# Patient Record
Sex: FEMALE | Race: WHITE | NOT HISPANIC OR LATINO | Employment: OTHER | URBAN - METROPOLITAN AREA
[De-identification: names, ages, dates, MRNs, and addresses within clinical notes are randomized per-mention and may not be internally consistent; named-entity substitution may affect disease eponyms.]

---

## 2017-01-05 ENCOUNTER — APPOINTMENT (OUTPATIENT)
Dept: LAB | Facility: CLINIC | Age: 82
End: 2017-01-05
Payer: MEDICARE

## 2017-01-05 ENCOUNTER — TRANSCRIBE ORDERS (OUTPATIENT)
Dept: LAB | Facility: CLINIC | Age: 82
End: 2017-01-05

## 2017-01-05 DIAGNOSIS — D64.9 ANEMIA, UNSPECIFIED: Primary | ICD-10-CM

## 2017-01-05 DIAGNOSIS — I10 ESSENTIAL HYPERTENSION, MALIGNANT: ICD-10-CM

## 2017-01-05 LAB
ANION GAP SERPL CALCULATED.3IONS-SCNC: 6 MMOL/L (ref 4–13)
BASOPHILS # BLD AUTO: 0.02 THOUSANDS/ΜL (ref 0–0.1)
BASOPHILS NFR BLD AUTO: 0 % (ref 0–1)
BUN SERPL-MCNC: 27 MG/DL (ref 5–25)
CALCIUM SERPL-MCNC: 9.2 MG/DL (ref 8.3–10.1)
CHLORIDE SERPL-SCNC: 103 MMOL/L (ref 100–108)
CO2 SERPL-SCNC: 30 MMOL/L (ref 21–32)
CREAT SERPL-MCNC: 0.94 MG/DL (ref 0.6–1.3)
EOSINOPHIL # BLD AUTO: 0.23 THOUSAND/ΜL (ref 0–0.61)
EOSINOPHIL NFR BLD AUTO: 4 % (ref 0–6)
ERYTHROCYTE [DISTWIDTH] IN BLOOD BY AUTOMATED COUNT: 14.5 % (ref 11.6–15.1)
GFR SERPL CREATININE-BSD FRML MDRD: 56.1 ML/MIN/1.73SQ M
GLUCOSE SERPL-MCNC: 102 MG/DL (ref 65–140)
HCT VFR BLD AUTO: 35.8 % (ref 34.8–46.1)
HGB BLD-MCNC: 11.4 G/DL (ref 11.5–15.4)
LYMPHOCYTES # BLD AUTO: 2.44 THOUSANDS/ΜL (ref 0.6–4.47)
LYMPHOCYTES NFR BLD AUTO: 38 % (ref 14–44)
MCH RBC QN AUTO: 29.8 PG (ref 26.8–34.3)
MCHC RBC AUTO-ENTMCNC: 31.8 G/DL (ref 31.4–37.4)
MCV RBC AUTO: 94 FL (ref 82–98)
MONOCYTES # BLD AUTO: 0.45 THOUSAND/ΜL (ref 0.17–1.22)
MONOCYTES NFR BLD AUTO: 7 % (ref 4–12)
NEUTROPHILS # BLD AUTO: 3.36 THOUSANDS/ΜL (ref 1.85–7.62)
NEUTS SEG NFR BLD AUTO: 51 % (ref 43–75)
NRBC BLD AUTO-RTO: 0 /100 WBCS
PLATELET # BLD AUTO: 234 THOUSANDS/UL (ref 149–390)
PMV BLD AUTO: 12.1 FL (ref 8.9–12.7)
POTASSIUM SERPL-SCNC: 3.7 MMOL/L (ref 3.5–5.3)
RBC # BLD AUTO: 3.82 MILLION/UL (ref 3.81–5.12)
SODIUM SERPL-SCNC: 139 MMOL/L (ref 136–145)
WBC # BLD AUTO: 6.51 THOUSAND/UL (ref 4.31–10.16)

## 2017-01-05 PROCEDURE — 36415 COLL VENOUS BLD VENIPUNCTURE: CPT

## 2017-01-05 PROCEDURE — 80048 BASIC METABOLIC PNL TOTAL CA: CPT

## 2017-01-05 PROCEDURE — 85025 COMPLETE CBC W/AUTO DIFF WBC: CPT

## 2017-03-03 ENCOUNTER — TRANSCRIBE ORDERS (OUTPATIENT)
Dept: ADMINISTRATIVE | Facility: HOSPITAL | Age: 82
End: 2017-03-03

## 2017-03-03 ENCOUNTER — HOSPITAL ENCOUNTER (OUTPATIENT)
Dept: RADIOLOGY | Facility: HOSPITAL | Age: 82
Discharge: HOME | End: 2017-03-03
Payer: MEDICARE

## 2017-03-03 DIAGNOSIS — M46.1 SACROILIITIS, NOT ELSEWHERE CLASSIFIED (HCC): ICD-10-CM

## 2017-03-03 DIAGNOSIS — M46.1 SACROILIITIS, NOT ELSEWHERE CLASSIFIED (HCC): Primary | ICD-10-CM

## 2017-03-03 PROCEDURE — 73522 X-RAY EXAM HIPS BI 3-4 VIEWS: CPT

## 2017-07-03 ENCOUNTER — TRANSCRIBE ORDERS (OUTPATIENT)
Dept: ADMINISTRATIVE | Facility: HOSPITAL | Age: 82
End: 2017-07-03

## 2017-07-03 DIAGNOSIS — M81.0 OSTEOPOROSIS, UNSPECIFIED OSTEOPOROSIS TYPE, UNSPECIFIED PATHOLOGICAL FRACTURE PRESENCE: Primary | ICD-10-CM

## 2017-07-13 ENCOUNTER — HOSPITAL ENCOUNTER (OUTPATIENT)
Dept: RADIOLOGY | Facility: HOSPITAL | Age: 82
Discharge: HOME | End: 2017-07-13
Attending: INTERNAL MEDICINE
Payer: MEDICARE

## 2017-07-13 DIAGNOSIS — M81.0 OSTEOPOROSIS, UNSPECIFIED OSTEOPOROSIS TYPE, UNSPECIFIED PATHOLOGICAL FRACTURE PRESENCE: ICD-10-CM

## 2017-07-13 PROCEDURE — 77080 DXA BONE DENSITY AXIAL: CPT

## 2017-08-23 ENCOUNTER — APPOINTMENT (OUTPATIENT)
Dept: LAB | Facility: CLINIC | Age: 82
End: 2017-08-23
Payer: MEDICARE

## 2017-08-23 ENCOUNTER — TRANSCRIBE ORDERS (OUTPATIENT)
Dept: LAB | Facility: CLINIC | Age: 82
End: 2017-08-23

## 2017-08-23 DIAGNOSIS — N39.0 URINARY TRACT INFECTION, SITE NOT SPECIFIED: ICD-10-CM

## 2017-08-23 DIAGNOSIS — E03.9 UNSPECIFIED HYPOTHYROIDISM: ICD-10-CM

## 2017-08-23 DIAGNOSIS — D64.9 ANEMIA, UNSPECIFIED: ICD-10-CM

## 2017-08-23 DIAGNOSIS — E13.9 OTHER SPECIFIED DIABETES MELLITUS: ICD-10-CM

## 2017-08-23 DIAGNOSIS — E55.9 UNSPECIFIED VITAMIN D DEFICIENCY: ICD-10-CM

## 2017-08-23 DIAGNOSIS — I10 ESSENTIAL HYPERTENSION, MALIGNANT: Primary | ICD-10-CM

## 2017-08-23 DIAGNOSIS — E78.00 PURE HYPERCHOLESTEROLEMIA: ICD-10-CM

## 2017-08-23 LAB
ALBUMIN SERPL BCP-MCNC: 3.4 G/DL (ref 3.5–5)
ALP SERPL-CCNC: 51 U/L (ref 46–116)
ALT SERPL W P-5'-P-CCNC: 21 U/L (ref 12–78)
ANION GAP SERPL CALCULATED.3IONS-SCNC: 8 MMOL/L (ref 4–13)
AST SERPL W P-5'-P-CCNC: 12 U/L (ref 5–45)
BACTERIA UR QL AUTO: ABNORMAL /HPF
BASOPHILS # BLD AUTO: 0.01 THOUSANDS/ΜL (ref 0–0.1)
BASOPHILS NFR BLD AUTO: 0 % (ref 0–1)
BILIRUB SERPL-MCNC: 1.38 MG/DL (ref 0.2–1)
BILIRUB UR QL STRIP: NEGATIVE
BUN SERPL-MCNC: 13 MG/DL (ref 5–25)
CALCIUM SERPL-MCNC: 9 MG/DL (ref 8.3–10.1)
CHLORIDE SERPL-SCNC: 93 MMOL/L (ref 100–108)
CHOLEST SERPL-MCNC: 146 MG/DL (ref 50–200)
CLARITY UR: CLEAR
CO2 SERPL-SCNC: 29 MMOL/L (ref 21–32)
COLOR UR: YELLOW
CREAT SERPL-MCNC: 0.94 MG/DL (ref 0.6–1.3)
EOSINOPHIL # BLD AUTO: 0.15 THOUSAND/ΜL (ref 0–0.61)
EOSINOPHIL NFR BLD AUTO: 2 % (ref 0–6)
ERYTHROCYTE [DISTWIDTH] IN BLOOD BY AUTOMATED COUNT: 14.2 % (ref 11.6–15.1)
EST. AVERAGE GLUCOSE BLD GHB EST-MCNC: 117 MG/DL
GFR SERPL CREATININE-BSD FRML MDRD: 54 ML/MIN/1.73SQ M
GLUCOSE P FAST SERPL-MCNC: 93 MG/DL (ref 65–99)
GLUCOSE UR STRIP-MCNC: NEGATIVE MG/DL
HBA1C MFR BLD: 5.7 % (ref 4.2–6.3)
HCT VFR BLD AUTO: 38.4 % (ref 34.8–46.1)
HDLC SERPL-MCNC: 53 MG/DL (ref 40–60)
HGB BLD-MCNC: 13.1 G/DL (ref 11.5–15.4)
HGB UR QL STRIP.AUTO: NEGATIVE
HYALINE CASTS #/AREA URNS LPF: ABNORMAL /LPF
KETONES UR STRIP-MCNC: NEGATIVE MG/DL
LDLC SERPL CALC-MCNC: 49 MG/DL (ref 0–100)
LEUKOCYTE ESTERASE UR QL STRIP: ABNORMAL
LYMPHOCYTES # BLD AUTO: 3.04 THOUSANDS/ΜL (ref 0.6–4.47)
LYMPHOCYTES NFR BLD AUTO: 36 % (ref 14–44)
MCH RBC QN AUTO: 30.8 PG (ref 26.8–34.3)
MCHC RBC AUTO-ENTMCNC: 34.1 G/DL (ref 31.4–37.4)
MCV RBC AUTO: 90 FL (ref 82–98)
MONOCYTES # BLD AUTO: 0.76 THOUSAND/ΜL (ref 0.17–1.22)
MONOCYTES NFR BLD AUTO: 9 % (ref 4–12)
NEUTROPHILS # BLD AUTO: 4.38 THOUSANDS/ΜL (ref 1.85–7.62)
NEUTS SEG NFR BLD AUTO: 53 % (ref 43–75)
NITRITE UR QL STRIP: NEGATIVE
NON-SQ EPI CELLS URNS QL MICRO: ABNORMAL /HPF
NRBC BLD AUTO-RTO: 0 /100 WBCS
PH UR STRIP.AUTO: 5.5 [PH] (ref 4.5–8)
PLATELET # BLD AUTO: 244 THOUSANDS/UL (ref 149–390)
PMV BLD AUTO: 12 FL (ref 8.9–12.7)
POTASSIUM SERPL-SCNC: 4.8 MMOL/L (ref 3.5–5.3)
PROT SERPL-MCNC: 7.4 G/DL (ref 6.4–8.2)
PROT UR STRIP-MCNC: NEGATIVE MG/DL
RBC # BLD AUTO: 4.26 MILLION/UL (ref 3.81–5.12)
RBC #/AREA URNS AUTO: ABNORMAL /HPF
SODIUM SERPL-SCNC: 130 MMOL/L (ref 136–145)
SP GR UR STRIP.AUTO: 1.01 (ref 1–1.03)
TRIGL SERPL-MCNC: 222 MG/DL
TSH SERPL DL<=0.05 MIU/L-ACNC: 5.62 UIU/ML (ref 0.36–3.74)
UROBILINOGEN UR QL STRIP.AUTO: 0.2 E.U./DL
WBC # BLD AUTO: 8.36 THOUSAND/UL (ref 4.31–10.16)
WBC #/AREA URNS AUTO: ABNORMAL /HPF

## 2017-08-23 PROCEDURE — 80061 LIPID PANEL: CPT

## 2017-08-23 PROCEDURE — 81001 URINALYSIS AUTO W/SCOPE: CPT

## 2017-08-23 PROCEDURE — 84443 ASSAY THYROID STIM HORMONE: CPT

## 2017-08-23 PROCEDURE — 83036 HEMOGLOBIN GLYCOSYLATED A1C: CPT

## 2017-08-23 PROCEDURE — 85025 COMPLETE CBC W/AUTO DIFF WBC: CPT

## 2017-08-23 PROCEDURE — 36415 COLL VENOUS BLD VENIPUNCTURE: CPT

## 2017-08-23 PROCEDURE — 80053 COMPREHEN METABOLIC PANEL: CPT

## 2017-09-11 ENCOUNTER — TRANSCRIBE ORDERS (OUTPATIENT)
Dept: LAB | Facility: CLINIC | Age: 82
End: 2017-09-11

## 2017-09-11 ENCOUNTER — APPOINTMENT (OUTPATIENT)
Dept: LAB | Facility: CLINIC | Age: 82
End: 2017-09-11
Payer: MEDICARE

## 2017-09-11 DIAGNOSIS — I10 ESSENTIAL HYPERTENSION, MALIGNANT: Primary | ICD-10-CM

## 2017-09-11 DIAGNOSIS — E87.8 ELECTROLYTE AND FLUID DISORDERS NOT ELSEWHERE CLASSIFIED: ICD-10-CM

## 2017-09-11 LAB
ANION GAP SERPL CALCULATED.3IONS-SCNC: 5 MMOL/L (ref 4–13)
BUN SERPL-MCNC: 18 MG/DL (ref 5–25)
CALCIUM SERPL-MCNC: 9.6 MG/DL (ref 8.3–10.1)
CHLORIDE SERPL-SCNC: 100 MMOL/L (ref 100–108)
CO2 SERPL-SCNC: 31 MMOL/L (ref 21–32)
CREAT SERPL-MCNC: 0.94 MG/DL (ref 0.6–1.3)
GFR SERPL CREATININE-BSD FRML MDRD: 54 ML/MIN/1.73SQ M
GLUCOSE P FAST SERPL-MCNC: 105 MG/DL (ref 65–99)
OSMOLALITY UR/SERPL-RTO: 293 MMOL/KG (ref 282–298)
POTASSIUM SERPL-SCNC: 4.2 MMOL/L (ref 3.5–5.3)
SODIUM SERPL-SCNC: 136 MMOL/L (ref 136–145)

## 2017-09-11 PROCEDURE — 80048 BASIC METABOLIC PNL TOTAL CA: CPT

## 2017-09-11 PROCEDURE — 83930 ASSAY OF BLOOD OSMOLALITY: CPT

## 2017-09-11 PROCEDURE — 36415 COLL VENOUS BLD VENIPUNCTURE: CPT

## 2018-01-29 ENCOUNTER — HOSPITAL ENCOUNTER (OUTPATIENT)
Dept: RADIOLOGY | Facility: HOSPITAL | Age: 83
Discharge: HOME | End: 2018-01-29
Attending: INTERNAL MEDICINE
Payer: MEDICARE

## 2018-01-29 ENCOUNTER — TRANSCRIBE ORDERS (OUTPATIENT)
Dept: ADMINISTRATIVE | Facility: HOSPITAL | Age: 83
End: 2018-01-29

## 2018-01-29 DIAGNOSIS — M54.2 NECK PAIN: Primary | ICD-10-CM

## 2018-01-29 PROCEDURE — 72050 X-RAY EXAM NECK SPINE 4/5VWS: CPT

## 2018-02-22 ENCOUNTER — TRANSCRIBE ORDERS (OUTPATIENT)
Dept: LAB | Facility: CLINIC | Age: 83
End: 2018-02-22

## 2018-02-22 ENCOUNTER — APPOINTMENT (OUTPATIENT)
Dept: LAB | Facility: CLINIC | Age: 83
End: 2018-02-22
Payer: MEDICARE

## 2018-02-22 DIAGNOSIS — I51.9 MYXEDEMA HEART DISEASE: ICD-10-CM

## 2018-02-22 DIAGNOSIS — E55.9 VITAMIN D DEFICIENCY: ICD-10-CM

## 2018-02-22 DIAGNOSIS — E13.8 DIABETES MELLITUS OF OTHER TYPE WITH COMPLICATION, UNSPECIFIED LONG TERM INSULIN USE STATUS: ICD-10-CM

## 2018-02-22 DIAGNOSIS — E03.9 MYXEDEMA HEART DISEASE: ICD-10-CM

## 2018-02-22 DIAGNOSIS — E78.00 PURE HYPERCHOLESTEROLEMIA: ICD-10-CM

## 2018-02-22 DIAGNOSIS — N39.0 URINARY TRACT INFECTION WITHOUT HEMATURIA, SITE UNSPECIFIED: Primary | ICD-10-CM

## 2018-02-22 DIAGNOSIS — I10 ESSENTIAL HYPERTENSION, MALIGNANT: ICD-10-CM

## 2018-02-22 DIAGNOSIS — D64.9 ANEMIA, UNSPECIFIED TYPE: ICD-10-CM

## 2018-02-22 LAB
25(OH)D3 SERPL-MCNC: 20.6 NG/ML (ref 30–100)
ALBUMIN SERPL BCP-MCNC: 3.5 G/DL (ref 3.5–5)
ALP SERPL-CCNC: 54 U/L (ref 46–116)
ALT SERPL W P-5'-P-CCNC: 20 U/L (ref 12–78)
ANION GAP SERPL CALCULATED.3IONS-SCNC: 6 MMOL/L (ref 4–13)
AST SERPL W P-5'-P-CCNC: 18 U/L (ref 5–45)
BACTERIA UR QL AUTO: ABNORMAL /HPF
BASOPHILS # BLD AUTO: 0.01 THOUSANDS/ΜL (ref 0–0.1)
BASOPHILS NFR BLD AUTO: 0 % (ref 0–1)
BILIRUB SERPL-MCNC: 0.6 MG/DL (ref 0.2–1)
BILIRUB UR QL STRIP: NEGATIVE
BUN SERPL-MCNC: 25 MG/DL (ref 5–25)
CALCIUM SERPL-MCNC: 9 MG/DL (ref 8.3–10.1)
CHLORIDE SERPL-SCNC: 101 MMOL/L (ref 100–108)
CHOLEST SERPL-MCNC: 136 MG/DL (ref 50–200)
CLARITY UR: CLEAR
CO2 SERPL-SCNC: 31 MMOL/L (ref 21–32)
COLOR UR: YELLOW
CREAT SERPL-MCNC: 0.94 MG/DL (ref 0.6–1.3)
EOSINOPHIL # BLD AUTO: 0.16 THOUSAND/ΜL (ref 0–0.61)
EOSINOPHIL NFR BLD AUTO: 3 % (ref 0–6)
ERYTHROCYTE [DISTWIDTH] IN BLOOD BY AUTOMATED COUNT: 14.2 % (ref 11.6–15.1)
EST. AVERAGE GLUCOSE BLD GHB EST-MCNC: 114 MG/DL
GFR SERPL CREATININE-BSD FRML MDRD: 53 ML/MIN/1.73SQ M
GLUCOSE P FAST SERPL-MCNC: 99 MG/DL (ref 65–99)
GLUCOSE UR STRIP-MCNC: NEGATIVE MG/DL
HBA1C MFR BLD: 5.6 % (ref 4.2–6.3)
HCT VFR BLD AUTO: 37.3 % (ref 34.8–46.1)
HDLC SERPL-MCNC: 56 MG/DL (ref 40–60)
HGB BLD-MCNC: 12.3 G/DL (ref 11.5–15.4)
HGB UR QL STRIP.AUTO: NEGATIVE
HYALINE CASTS #/AREA URNS LPF: ABNORMAL /LPF
KETONES UR STRIP-MCNC: NEGATIVE MG/DL
LDLC SERPL CALC-MCNC: 56 MG/DL (ref 0–100)
LEUKOCYTE ESTERASE UR QL STRIP: ABNORMAL
LYMPHOCYTES # BLD AUTO: 2.2 THOUSANDS/ΜL (ref 0.6–4.47)
LYMPHOCYTES NFR BLD AUTO: 42 % (ref 14–44)
MCH RBC QN AUTO: 30.6 PG (ref 26.8–34.3)
MCHC RBC AUTO-ENTMCNC: 33 G/DL (ref 31.4–37.4)
MCV RBC AUTO: 93 FL (ref 82–98)
MONOCYTES # BLD AUTO: 0.38 THOUSAND/ΜL (ref 0.17–1.22)
MONOCYTES NFR BLD AUTO: 7 % (ref 4–12)
NEUTROPHILS # BLD AUTO: 2.51 THOUSANDS/ΜL (ref 1.85–7.62)
NEUTS SEG NFR BLD AUTO: 48 % (ref 43–75)
NITRITE UR QL STRIP: NEGATIVE
NON-SQ EPI CELLS URNS QL MICRO: ABNORMAL /HPF
NRBC BLD AUTO-RTO: 0 /100 WBCS
PH UR STRIP.AUTO: 5.5 [PH] (ref 4.5–8)
PLATELET # BLD AUTO: 175 THOUSANDS/UL (ref 149–390)
PMV BLD AUTO: 12.5 FL (ref 8.9–12.7)
POTASSIUM SERPL-SCNC: 3.8 MMOL/L (ref 3.5–5.3)
PROT SERPL-MCNC: 7.1 G/DL (ref 6.4–8.2)
PROT UR STRIP-MCNC: NEGATIVE MG/DL
RBC # BLD AUTO: 4.02 MILLION/UL (ref 3.81–5.12)
RBC #/AREA URNS AUTO: ABNORMAL /HPF
SODIUM SERPL-SCNC: 138 MMOL/L (ref 136–145)
SP GR UR STRIP.AUTO: 1.01 (ref 1–1.03)
TRIGL SERPL-MCNC: 119 MG/DL
TSH SERPL DL<=0.05 MIU/L-ACNC: 0.68 UIU/ML (ref 0.36–3.74)
UROBILINOGEN UR QL STRIP.AUTO: 0.2 E.U./DL
WBC # BLD AUTO: 5.27 THOUSAND/UL (ref 4.31–10.16)
WBC #/AREA URNS AUTO: ABNORMAL /HPF

## 2018-02-22 PROCEDURE — 80053 COMPREHEN METABOLIC PANEL: CPT

## 2018-02-22 PROCEDURE — 83036 HEMOGLOBIN GLYCOSYLATED A1C: CPT

## 2018-02-22 PROCEDURE — 85025 COMPLETE CBC W/AUTO DIFF WBC: CPT

## 2018-02-22 PROCEDURE — 81001 URINALYSIS AUTO W/SCOPE: CPT

## 2018-02-22 PROCEDURE — 36415 COLL VENOUS BLD VENIPUNCTURE: CPT

## 2018-02-22 PROCEDURE — 80061 LIPID PANEL: CPT

## 2018-02-22 PROCEDURE — 84443 ASSAY THYROID STIM HORMONE: CPT

## 2018-02-22 PROCEDURE — 82306 VITAMIN D 25 HYDROXY: CPT

## 2018-05-14 ENCOUNTER — HOSPITAL ENCOUNTER (OUTPATIENT)
Dept: RADIOLOGY | Facility: HOSPITAL | Age: 83
Discharge: HOME | End: 2018-05-14
Attending: INTERNAL MEDICINE
Payer: MEDICARE

## 2018-05-14 ENCOUNTER — TRANSCRIBE ORDERS (OUTPATIENT)
Dept: ADMINISTRATIVE | Facility: HOSPITAL | Age: 83
End: 2018-05-14

## 2018-05-14 DIAGNOSIS — M17.0 PRIMARY OSTEOARTHRITIS OF BOTH KNEES: ICD-10-CM

## 2018-05-14 DIAGNOSIS — M17.0 PRIMARY OSTEOARTHRITIS OF BOTH KNEES: Primary | ICD-10-CM

## 2018-05-14 PROCEDURE — 73562 X-RAY EXAM OF KNEE 3: CPT

## 2018-12-27 ENCOUNTER — TRANSCRIBE ORDERS (OUTPATIENT)
Dept: LAB | Facility: CLINIC | Age: 83
End: 2018-12-27

## 2019-01-09 ENCOUNTER — TRANSCRIBE ORDERS (OUTPATIENT)
Dept: LAB | Facility: CLINIC | Age: 84
End: 2019-01-09

## 2019-01-09 ENCOUNTER — APPOINTMENT (OUTPATIENT)
Dept: LAB | Facility: CLINIC | Age: 84
End: 2019-01-09
Payer: MEDICARE

## 2019-01-09 DIAGNOSIS — E13.8 DIABETES MELLITUS OF OTHER TYPE WITH COMPLICATION, UNSPECIFIED WHETHER LONG TERM INSULIN USE: ICD-10-CM

## 2019-01-09 DIAGNOSIS — E55.9 VITAMIN D DEFICIENCY, UNSPECIFIED: ICD-10-CM

## 2019-01-09 DIAGNOSIS — D64.9 ANEMIA, UNSPECIFIED TYPE: ICD-10-CM

## 2019-01-09 DIAGNOSIS — E78.00 PURE HYPERCHOLESTEROLEMIA: ICD-10-CM

## 2019-01-09 DIAGNOSIS — N39.0 URINARY TRACT INFECTION WITHOUT HEMATURIA, SITE UNSPECIFIED: ICD-10-CM

## 2019-01-09 DIAGNOSIS — I10 ESSENTIAL HYPERTENSION, MALIGNANT: Primary | ICD-10-CM

## 2019-01-09 LAB
25(OH)D3 SERPL-MCNC: 24.8 NG/ML (ref 30–100)
ALBUMIN SERPL BCP-MCNC: 4.1 G/DL (ref 3.5–5)
ALP SERPL-CCNC: 45 U/L (ref 46–116)
ALT SERPL W P-5'-P-CCNC: 21 U/L (ref 12–78)
ANION GAP SERPL CALCULATED.3IONS-SCNC: 4 MMOL/L (ref 4–13)
AST SERPL W P-5'-P-CCNC: 16 U/L (ref 5–45)
BACTERIA UR QL AUTO: ABNORMAL /HPF
BASOPHILS # BLD AUTO: 0.03 THOUSANDS/ΜL (ref 0–0.1)
BASOPHILS NFR BLD AUTO: 1 % (ref 0–1)
BILIRUB SERPL-MCNC: 0.62 MG/DL (ref 0.2–1)
BILIRUB UR QL STRIP: NEGATIVE
BUN SERPL-MCNC: 24 MG/DL (ref 5–25)
CALCIUM SERPL-MCNC: 8.8 MG/DL (ref 8.3–10.1)
CHLORIDE SERPL-SCNC: 103 MMOL/L (ref 100–108)
CHOLEST SERPL-MCNC: 146 MG/DL (ref 50–200)
CLARITY UR: CLEAR
CO2 SERPL-SCNC: 32 MMOL/L (ref 21–32)
COLOR UR: YELLOW
CREAT SERPL-MCNC: 1.08 MG/DL (ref 0.6–1.3)
EOSINOPHIL # BLD AUTO: 0.17 THOUSAND/ΜL (ref 0–0.61)
EOSINOPHIL NFR BLD AUTO: 3 % (ref 0–6)
ERYTHROCYTE [DISTWIDTH] IN BLOOD BY AUTOMATED COUNT: 14.1 % (ref 11.6–15.1)
EST. AVERAGE GLUCOSE BLD GHB EST-MCNC: 128 MG/DL
GFR SERPL CREATININE-BSD FRML MDRD: 45 ML/MIN/1.73SQ M
GLUCOSE P FAST SERPL-MCNC: 103 MG/DL (ref 65–99)
GLUCOSE UR STRIP-MCNC: NEGATIVE MG/DL
HBA1C MFR BLD: 6.1 % (ref 4.2–6.3)
HCT VFR BLD AUTO: 38.5 % (ref 34.8–46.1)
HDLC SERPL-MCNC: 56 MG/DL (ref 40–60)
HGB BLD-MCNC: 12.4 G/DL (ref 11.5–15.4)
HGB UR QL STRIP.AUTO: NEGATIVE
HYALINE CASTS #/AREA URNS LPF: ABNORMAL /LPF
IMM GRANULOCYTES # BLD AUTO: 0.01 THOUSAND/UL (ref 0–0.2)
IMM GRANULOCYTES NFR BLD AUTO: 0 % (ref 0–2)
KETONES UR STRIP-MCNC: NEGATIVE MG/DL
LDLC SERPL CALC-MCNC: 62 MG/DL (ref 0–100)
LEUKOCYTE ESTERASE UR QL STRIP: ABNORMAL
LYMPHOCYTES # BLD AUTO: 2.71 THOUSANDS/ΜL (ref 0.6–4.47)
LYMPHOCYTES NFR BLD AUTO: 46 % (ref 14–44)
MCH RBC QN AUTO: 31 PG (ref 26.8–34.3)
MCHC RBC AUTO-ENTMCNC: 32.2 G/DL (ref 31.4–37.4)
MCV RBC AUTO: 96 FL (ref 82–98)
MONOCYTES # BLD AUTO: 0.4 THOUSAND/ΜL (ref 0.17–1.22)
MONOCYTES NFR BLD AUTO: 7 % (ref 4–12)
NEUTROPHILS # BLD AUTO: 2.51 THOUSANDS/ΜL (ref 1.85–7.62)
NEUTS SEG NFR BLD AUTO: 43 % (ref 43–75)
NITRITE UR QL STRIP: NEGATIVE
NON-SQ EPI CELLS URNS QL MICRO: ABNORMAL /HPF
NONHDLC SERPL-MCNC: 90 MG/DL
NRBC BLD AUTO-RTO: 0 /100 WBCS
PH UR STRIP.AUTO: 6 [PH] (ref 4.5–8)
PLATELET # BLD AUTO: 180 THOUSANDS/UL (ref 149–390)
PMV BLD AUTO: 12.2 FL (ref 8.9–12.7)
POTASSIUM SERPL-SCNC: 3.7 MMOL/L (ref 3.5–5.3)
PROT SERPL-MCNC: 7.5 G/DL (ref 6.4–8.2)
PROT UR STRIP-MCNC: ABNORMAL MG/DL
RBC # BLD AUTO: 4 MILLION/UL (ref 3.81–5.12)
RBC #/AREA URNS AUTO: ABNORMAL /HPF
SODIUM SERPL-SCNC: 139 MMOL/L (ref 136–145)
SP GR UR STRIP.AUTO: 1.02 (ref 1–1.03)
TRIGL SERPL-MCNC: 138 MG/DL
TSH SERPL DL<=0.05 MIU/L-ACNC: 0.81 UIU/ML (ref 0.36–3.74)
UROBILINOGEN UR QL STRIP.AUTO: 0.2 E.U./DL
VIT B12 SERPL-MCNC: 751 PG/ML (ref 100–900)
WBC # BLD AUTO: 5.83 THOUSAND/UL (ref 4.31–10.16)
WBC #/AREA URNS AUTO: ABNORMAL /HPF

## 2019-01-09 PROCEDURE — 84443 ASSAY THYROID STIM HORMONE: CPT

## 2019-01-09 PROCEDURE — 85025 COMPLETE CBC W/AUTO DIFF WBC: CPT

## 2019-01-09 PROCEDURE — 36415 COLL VENOUS BLD VENIPUNCTURE: CPT

## 2019-01-09 PROCEDURE — 82607 VITAMIN B-12: CPT

## 2019-01-09 PROCEDURE — 80053 COMPREHEN METABOLIC PANEL: CPT

## 2019-01-09 PROCEDURE — 82306 VITAMIN D 25 HYDROXY: CPT

## 2019-01-09 PROCEDURE — 83036 HEMOGLOBIN GLYCOSYLATED A1C: CPT

## 2019-01-09 PROCEDURE — 81001 URINALYSIS AUTO W/SCOPE: CPT

## 2019-01-09 PROCEDURE — 80061 LIPID PANEL: CPT

## 2019-09-12 ENCOUNTER — APPOINTMENT (OUTPATIENT)
Dept: LAB | Facility: CLINIC | Age: 84
End: 2019-09-12
Payer: MEDICARE

## 2019-09-12 ENCOUNTER — TRANSCRIBE ORDERS (OUTPATIENT)
Dept: LAB | Facility: CLINIC | Age: 84
End: 2019-09-12

## 2019-09-12 DIAGNOSIS — D03.9: ICD-10-CM

## 2019-09-12 DIAGNOSIS — I10 ESSENTIAL HYPERTENSION, BENIGN: Primary | ICD-10-CM

## 2019-09-12 DIAGNOSIS — E55.9 AVITAMINOSIS D: ICD-10-CM

## 2019-09-12 DIAGNOSIS — E11.9 DIABETES MELLITUS WITHOUT COMPLICATION (HCC): ICD-10-CM

## 2019-09-12 DIAGNOSIS — E78.00 PURE HYPERCHOLESTEROLEMIA: ICD-10-CM

## 2019-09-12 LAB
ALBUMIN SERPL BCP-MCNC: 3.6 G/DL (ref 3.5–5)
ALP SERPL-CCNC: 43 U/L (ref 46–116)
ALT SERPL W P-5'-P-CCNC: 20 U/L (ref 12–78)
ANION GAP SERPL CALCULATED.3IONS-SCNC: 4 MMOL/L (ref 4–13)
AST SERPL W P-5'-P-CCNC: 15 U/L (ref 5–45)
BILIRUB SERPL-MCNC: 0.54 MG/DL (ref 0.2–1)
BUN SERPL-MCNC: 24 MG/DL (ref 5–25)
CALCIUM SERPL-MCNC: 9.3 MG/DL (ref 8.3–10.1)
CHLORIDE SERPL-SCNC: 103 MMOL/L (ref 100–108)
CHOLEST SERPL-MCNC: 139 MG/DL (ref 50–200)
CO2 SERPL-SCNC: 31 MMOL/L (ref 21–32)
CREAT SERPL-MCNC: 0.96 MG/DL (ref 0.6–1.3)
GFR SERPL CREATININE-BSD FRML MDRD: 52 ML/MIN/1.73SQ M
GLUCOSE P FAST SERPL-MCNC: 105 MG/DL (ref 65–99)
HDLC SERPL-MCNC: 55 MG/DL (ref 40–60)
LDLC SERPL CALC-MCNC: 64 MG/DL (ref 0–100)
NONHDLC SERPL-MCNC: 84 MG/DL
POTASSIUM SERPL-SCNC: 3.8 MMOL/L (ref 3.5–5.3)
PROT SERPL-MCNC: 7 G/DL (ref 6.4–8.2)
SODIUM SERPL-SCNC: 138 MMOL/L (ref 136–145)
TRIGL SERPL-MCNC: 99 MG/DL
TSH SERPL DL<=0.05 MIU/L-ACNC: 1.01 UIU/ML (ref 0.36–3.74)

## 2019-09-12 PROCEDURE — 80053 COMPREHEN METABOLIC PANEL: CPT

## 2019-09-12 PROCEDURE — 36415 COLL VENOUS BLD VENIPUNCTURE: CPT

## 2019-09-12 PROCEDURE — 84443 ASSAY THYROID STIM HORMONE: CPT

## 2019-09-12 PROCEDURE — 80061 LIPID PANEL: CPT

## 2020-07-09 ENCOUNTER — TRANSCRIBE ORDERS (OUTPATIENT)
Dept: ADMINISTRATIVE | Facility: HOSPITAL | Age: 85
End: 2020-07-09

## 2020-07-09 DIAGNOSIS — Z13.820 SPECIAL SCREENING FOR OSTEOPOROSIS: Primary | ICD-10-CM

## 2020-08-13 ENCOUNTER — APPOINTMENT (OUTPATIENT)
Dept: LAB | Facility: CLINIC | Age: 85
End: 2020-08-13
Payer: MEDICARE

## 2020-08-13 ENCOUNTER — TRANSCRIBE ORDERS (OUTPATIENT)
Dept: LAB | Facility: CLINIC | Age: 85
End: 2020-08-13

## 2020-08-13 DIAGNOSIS — E03.9 MYXEDEMA HEART DISEASE: ICD-10-CM

## 2020-08-13 DIAGNOSIS — N39.0 URINARY TRACT INFECTION WITHOUT HEMATURIA, SITE UNSPECIFIED: ICD-10-CM

## 2020-08-13 DIAGNOSIS — I10 ESSENTIAL HYPERTENSION, BENIGN: ICD-10-CM

## 2020-08-13 DIAGNOSIS — E78.00 PURE HYPERCHOLESTEROLEMIA: ICD-10-CM

## 2020-08-13 DIAGNOSIS — E11.9 DIABETES MELLITUS WITHOUT COMPLICATION (HCC): ICD-10-CM

## 2020-08-13 DIAGNOSIS — D51.9 ANEMIA DUE TO VITAMIN B12 DEFICIENCY, UNSPECIFIED B12 DEFICIENCY TYPE: Primary | ICD-10-CM

## 2020-08-13 DIAGNOSIS — I51.9 MYXEDEMA HEART DISEASE: ICD-10-CM

## 2020-08-13 LAB
25(OH)D3 SERPL-MCNC: 19.7 NG/ML (ref 30–100)
ALBUMIN SERPL BCP-MCNC: 3.5 G/DL (ref 3.5–5)
ALP SERPL-CCNC: 40 U/L (ref 46–116)
ALT SERPL W P-5'-P-CCNC: 20 U/L (ref 12–78)
ANION GAP SERPL CALCULATED.3IONS-SCNC: 3 MMOL/L (ref 4–13)
AST SERPL W P-5'-P-CCNC: 16 U/L (ref 5–45)
BACTERIA UR QL AUTO: ABNORMAL /HPF
BASOPHILS # BLD AUTO: 0.02 THOUSANDS/ΜL (ref 0–0.1)
BASOPHILS NFR BLD AUTO: 0 % (ref 0–1)
BILIRUB SERPL-MCNC: 0.53 MG/DL (ref 0.2–1)
BILIRUB UR QL STRIP: NEGATIVE
BUN SERPL-MCNC: 19 MG/DL (ref 5–25)
CALCIUM SERPL-MCNC: 9.3 MG/DL (ref 8.3–10.1)
CHLORIDE SERPL-SCNC: 106 MMOL/L (ref 100–108)
CHOLEST SERPL-MCNC: 136 MG/DL (ref 50–200)
CLARITY UR: CLEAR
CO2 SERPL-SCNC: 33 MMOL/L (ref 21–32)
COLOR UR: YELLOW
CREAT SERPL-MCNC: 0.92 MG/DL (ref 0.6–1.3)
EOSINOPHIL # BLD AUTO: 0.1 THOUSAND/ΜL (ref 0–0.61)
EOSINOPHIL NFR BLD AUTO: 2 % (ref 0–6)
ERYTHROCYTE [DISTWIDTH] IN BLOOD BY AUTOMATED COUNT: 14 % (ref 11.6–15.1)
EST. AVERAGE GLUCOSE BLD GHB EST-MCNC: 105 MG/DL
GFR SERPL CREATININE-BSD FRML MDRD: 54 ML/MIN/1.73SQ M
GLUCOSE P FAST SERPL-MCNC: 101 MG/DL (ref 65–99)
GLUCOSE UR STRIP-MCNC: NEGATIVE MG/DL
HBA1C MFR BLD: 5.3 %
HCT VFR BLD AUTO: 37.2 % (ref 34.8–46.1)
HDLC SERPL-MCNC: 49 MG/DL
HGB BLD-MCNC: 12.3 G/DL (ref 11.5–15.4)
HGB UR QL STRIP.AUTO: NEGATIVE
HYALINE CASTS #/AREA URNS LPF: ABNORMAL /LPF
IMM GRANULOCYTES # BLD AUTO: 0.01 THOUSAND/UL (ref 0–0.2)
IMM GRANULOCYTES NFR BLD AUTO: 0 % (ref 0–2)
KETONES UR STRIP-MCNC: NEGATIVE MG/DL
LDLC SERPL CALC-MCNC: 61 MG/DL (ref 0–100)
LEUKOCYTE ESTERASE UR QL STRIP: ABNORMAL
LYMPHOCYTES # BLD AUTO: 1.77 THOUSANDS/ΜL (ref 0.6–4.47)
LYMPHOCYTES NFR BLD AUTO: 38 % (ref 14–44)
MCH RBC QN AUTO: 32 PG (ref 26.8–34.3)
MCHC RBC AUTO-ENTMCNC: 33.1 G/DL (ref 31.4–37.4)
MCV RBC AUTO: 97 FL (ref 82–98)
MONOCYTES # BLD AUTO: 0.32 THOUSAND/ΜL (ref 0.17–1.22)
MONOCYTES NFR BLD AUTO: 7 % (ref 4–12)
NEUTROPHILS # BLD AUTO: 2.42 THOUSANDS/ΜL (ref 1.85–7.62)
NEUTS SEG NFR BLD AUTO: 53 % (ref 43–75)
NITRITE UR QL STRIP: NEGATIVE
NON-SQ EPI CELLS URNS QL MICRO: ABNORMAL /HPF
NONHDLC SERPL-MCNC: 87 MG/DL
NRBC BLD AUTO-RTO: 0 /100 WBCS
PH UR STRIP.AUTO: 5.5 [PH]
PLATELET # BLD AUTO: 159 THOUSANDS/UL (ref 149–390)
PMV BLD AUTO: 12.4 FL (ref 8.9–12.7)
POTASSIUM SERPL-SCNC: 4.1 MMOL/L (ref 3.5–5.3)
PROT SERPL-MCNC: 7.1 G/DL (ref 6.4–8.2)
PROT UR STRIP-MCNC: NEGATIVE MG/DL
RBC # BLD AUTO: 3.84 MILLION/UL (ref 3.81–5.12)
RBC #/AREA URNS AUTO: ABNORMAL /HPF
SODIUM SERPL-SCNC: 142 MMOL/L (ref 136–145)
SP GR UR STRIP.AUTO: 1.02 (ref 1–1.03)
T4 FREE SERPL-MCNC: 1.1 NG/DL (ref 0.76–1.46)
TRIGL SERPL-MCNC: 129 MG/DL
TSH SERPL DL<=0.05 MIU/L-ACNC: 0.88 UIU/ML (ref 0.36–3.74)
UROBILINOGEN UR QL STRIP.AUTO: 0.2 E.U./DL
WBC # BLD AUTO: 4.64 THOUSAND/UL (ref 4.31–10.16)
WBC #/AREA URNS AUTO: ABNORMAL /HPF

## 2020-08-13 PROCEDURE — 80053 COMPREHEN METABOLIC PANEL: CPT

## 2020-08-13 PROCEDURE — 36415 COLL VENOUS BLD VENIPUNCTURE: CPT

## 2020-08-13 PROCEDURE — 81001 URINALYSIS AUTO W/SCOPE: CPT

## 2020-08-13 PROCEDURE — 84439 ASSAY OF FREE THYROXINE: CPT

## 2020-08-13 PROCEDURE — 83036 HEMOGLOBIN GLYCOSYLATED A1C: CPT

## 2020-08-13 PROCEDURE — 85025 COMPLETE CBC W/AUTO DIFF WBC: CPT

## 2020-08-13 PROCEDURE — 82306 VITAMIN D 25 HYDROXY: CPT

## 2020-08-13 PROCEDURE — 84443 ASSAY THYROID STIM HORMONE: CPT

## 2020-08-13 PROCEDURE — 80061 LIPID PANEL: CPT

## 2020-08-18 ENCOUNTER — HOSPITAL ENCOUNTER (OUTPATIENT)
Dept: RADIOLOGY | Facility: HOSPITAL | Age: 85
Discharge: HOME | End: 2020-08-18
Attending: INTERNAL MEDICINE
Payer: MEDICARE

## 2020-08-18 DIAGNOSIS — Z13.820 SPECIAL SCREENING FOR OSTEOPOROSIS: ICD-10-CM

## 2020-08-18 PROCEDURE — 77080 DXA BONE DENSITY AXIAL: CPT

## 2020-09-01 ENCOUNTER — HOSPITAL ENCOUNTER (OUTPATIENT)
Dept: INFUSION CENTER | Facility: HOSPITAL | Age: 85
Discharge: HOME | End: 2020-09-01
Payer: MEDICARE

## 2020-09-01 VITALS
RESPIRATION RATE: 18 BRPM | SYSTOLIC BLOOD PRESSURE: 143 MMHG | OXYGEN SATURATION: 97 % | HEART RATE: 69 BPM | TEMPERATURE: 97 F | DIASTOLIC BLOOD PRESSURE: 65 MMHG

## 2020-09-01 PROCEDURE — 96401 CHEMO ANTI-NEOPL SQ/IM: CPT

## 2020-09-01 RX ADMIN — DENOSUMAB 60 MG: 60 INJECTION SUBCUTANEOUS at 15:15

## 2021-02-17 ENCOUNTER — TRANSCRIBE ORDERS (OUTPATIENT)
Dept: LAB | Facility: CLINIC | Age: 86
End: 2021-02-17

## 2021-02-17 ENCOUNTER — LAB (OUTPATIENT)
Dept: LAB | Facility: CLINIC | Age: 86
End: 2021-02-17
Payer: MEDICARE

## 2021-02-17 DIAGNOSIS — E78.00 PURE HYPERCHOLESTEROLEMIA: ICD-10-CM

## 2021-02-17 DIAGNOSIS — D64.9 ANEMIA, UNSPECIFIED TYPE: ICD-10-CM

## 2021-02-17 DIAGNOSIS — N39.0 LOWER URINARY TRACT INFECTIOUS DISEASE: ICD-10-CM

## 2021-02-17 DIAGNOSIS — E03.9 MYXEDEMA HEART DISEASE: ICD-10-CM

## 2021-02-17 DIAGNOSIS — I10 ESSENTIAL HYPERTENSION, BENIGN: Primary | ICD-10-CM

## 2021-02-17 DIAGNOSIS — E55.9 AVITAMINOSIS D: ICD-10-CM

## 2021-02-17 DIAGNOSIS — I51.9 MYXEDEMA HEART DISEASE: ICD-10-CM

## 2021-02-17 DIAGNOSIS — E11.9 DIABETES MELLITUS WITHOUT COMPLICATION (HCC): ICD-10-CM

## 2021-02-17 LAB
ALBUMIN SERPL BCP-MCNC: 3.6 G/DL (ref 3.5–5)
ALP SERPL-CCNC: 40 U/L (ref 46–116)
ALT SERPL W P-5'-P-CCNC: 20 U/L (ref 12–78)
ANION GAP SERPL CALCULATED.3IONS-SCNC: 5 MMOL/L (ref 4–13)
AST SERPL W P-5'-P-CCNC: 15 U/L (ref 5–45)
BASOPHILS # BLD AUTO: 0.01 THOUSANDS/ΜL (ref 0–0.1)
BASOPHILS NFR BLD AUTO: 0 % (ref 0–1)
BILIRUB SERPL-MCNC: 0.66 MG/DL (ref 0.2–1)
BUN SERPL-MCNC: 25 MG/DL (ref 5–25)
CALCIUM SERPL-MCNC: 10.1 MG/DL (ref 8.3–10.1)
CHLORIDE SERPL-SCNC: 105 MMOL/L (ref 100–108)
CHOLEST SERPL-MCNC: 159 MG/DL (ref 50–200)
CO2 SERPL-SCNC: 33 MMOL/L (ref 21–32)
CREAT SERPL-MCNC: 1.03 MG/DL (ref 0.6–1.3)
EOSINOPHIL # BLD AUTO: 0.12 THOUSAND/ΜL (ref 0–0.61)
EOSINOPHIL NFR BLD AUTO: 3 % (ref 0–6)
ERYTHROCYTE [DISTWIDTH] IN BLOOD BY AUTOMATED COUNT: 13.8 % (ref 11.6–15.1)
GFR SERPL CREATININE-BSD FRML MDRD: 47 ML/MIN/1.73SQ M
GLUCOSE P FAST SERPL-MCNC: 113 MG/DL (ref 65–99)
HCT VFR BLD AUTO: 38 % (ref 34.8–46.1)
HDLC SERPL-MCNC: 60 MG/DL
HGB BLD-MCNC: 12.4 G/DL (ref 11.5–15.4)
IMM GRANULOCYTES # BLD AUTO: 0.01 THOUSAND/UL (ref 0–0.2)
IMM GRANULOCYTES NFR BLD AUTO: 0 % (ref 0–2)
LDLC SERPL CALC-MCNC: 70 MG/DL (ref 0–100)
LYMPHOCYTES # BLD AUTO: 1.71 THOUSANDS/ΜL (ref 0.6–4.47)
LYMPHOCYTES NFR BLD AUTO: 39 % (ref 14–44)
MCH RBC QN AUTO: 31.7 PG (ref 26.8–34.3)
MCHC RBC AUTO-ENTMCNC: 32.6 G/DL (ref 31.4–37.4)
MCV RBC AUTO: 97 FL (ref 82–98)
MONOCYTES # BLD AUTO: 0.31 THOUSAND/ΜL (ref 0.17–1.22)
MONOCYTES NFR BLD AUTO: 7 % (ref 4–12)
NEUTROPHILS # BLD AUTO: 2.2 THOUSANDS/ΜL (ref 1.85–7.62)
NEUTS SEG NFR BLD AUTO: 51 % (ref 43–75)
NONHDLC SERPL-MCNC: 99 MG/DL
NRBC BLD AUTO-RTO: 0 /100 WBCS
PLATELET # BLD AUTO: 159 THOUSANDS/UL (ref 149–390)
PMV BLD AUTO: 12.7 FL (ref 8.9–12.7)
POTASSIUM SERPL-SCNC: 3.8 MMOL/L (ref 3.5–5.3)
PROT SERPL-MCNC: 7.1 G/DL (ref 6.4–8.2)
RBC # BLD AUTO: 3.91 MILLION/UL (ref 3.81–5.12)
SODIUM SERPL-SCNC: 143 MMOL/L (ref 136–145)
T4 FREE SERPL-MCNC: 1.19 NG/DL (ref 0.76–1.46)
TRIGL SERPL-MCNC: 146 MG/DL
TSH SERPL DL<=0.05 MIU/L-ACNC: 0.84 UIU/ML (ref 0.36–3.74)
WBC # BLD AUTO: 4.36 THOUSAND/UL (ref 4.31–10.16)

## 2021-02-17 PROCEDURE — 84443 ASSAY THYROID STIM HORMONE: CPT

## 2021-02-17 PROCEDURE — 36415 COLL VENOUS BLD VENIPUNCTURE: CPT

## 2021-02-17 PROCEDURE — 80053 COMPREHEN METABOLIC PANEL: CPT

## 2021-02-17 PROCEDURE — 85025 COMPLETE CBC W/AUTO DIFF WBC: CPT

## 2021-02-17 PROCEDURE — 80061 LIPID PANEL: CPT

## 2021-02-17 PROCEDURE — 84439 ASSAY OF FREE THYROXINE: CPT

## 2021-03-10 DIAGNOSIS — Z23 ENCOUNTER FOR IMMUNIZATION: ICD-10-CM

## 2021-03-15 ENCOUNTER — IMMUNIZATIONS (OUTPATIENT)
Dept: FAMILY MEDICINE CLINIC | Facility: HOSPITAL | Age: 86
End: 2021-03-15

## 2021-03-15 DIAGNOSIS — Z23 ENCOUNTER FOR IMMUNIZATION: Primary | ICD-10-CM

## 2021-03-15 PROCEDURE — 91301 SARS-COV-2 / COVID-19 MRNA VACCINE (MODERNA) 100 MCG: CPT

## 2021-03-15 PROCEDURE — 0011A SARS-COV-2 / COVID-19 MRNA VACCINE (MODERNA) 100 MCG: CPT

## 2021-04-14 ENCOUNTER — IMMUNIZATIONS (OUTPATIENT)
Dept: FAMILY MEDICINE CLINIC | Facility: HOSPITAL | Age: 86
End: 2021-04-14

## 2021-04-14 DIAGNOSIS — Z23 ENCOUNTER FOR IMMUNIZATION: Primary | ICD-10-CM

## 2021-04-14 PROCEDURE — 0012A SARS-COV-2 / COVID-19 MRNA VACCINE (MODERNA) 100 MCG: CPT

## 2021-04-14 PROCEDURE — 91301 SARS-COV-2 / COVID-19 MRNA VACCINE (MODERNA) 100 MCG: CPT

## 2021-05-13 ENCOUNTER — TRANSCRIBE ORDERS (OUTPATIENT)
Dept: LAB | Facility: CLINIC | Age: 86
End: 2021-05-13

## 2021-05-13 ENCOUNTER — APPOINTMENT (OUTPATIENT)
Dept: LAB | Facility: CLINIC | Age: 86
End: 2021-05-13
Payer: MEDICARE

## 2021-05-13 DIAGNOSIS — D50.8 OTHER IRON DEFICIENCY ANEMIA: ICD-10-CM

## 2021-05-13 DIAGNOSIS — E78.00 HYPERCHOLESTEREMIA: ICD-10-CM

## 2021-05-13 DIAGNOSIS — I10 ESSENTIAL HYPERTENSION, MALIGNANT: ICD-10-CM

## 2021-05-13 DIAGNOSIS — I10 ESSENTIAL HYPERTENSION, MALIGNANT: Primary | ICD-10-CM

## 2021-05-13 LAB
ALBUMIN SERPL BCP-MCNC: 3.6 G/DL (ref 3.5–5)
ALP SERPL-CCNC: 39 U/L (ref 46–116)
ALT SERPL W P-5'-P-CCNC: 23 U/L (ref 12–78)
ANION GAP SERPL CALCULATED.3IONS-SCNC: 3 MMOL/L (ref 4–13)
AST SERPL W P-5'-P-CCNC: 19 U/L (ref 5–45)
BILIRUB SERPL-MCNC: 0.64 MG/DL (ref 0.2–1)
BUN SERPL-MCNC: 20 MG/DL (ref 5–25)
CALCIUM SERPL-MCNC: 9.5 MG/DL (ref 8.3–10.1)
CHLORIDE SERPL-SCNC: 99 MMOL/L (ref 100–108)
CO2 SERPL-SCNC: 36 MMOL/L (ref 21–32)
CREAT SERPL-MCNC: 1.02 MG/DL (ref 0.6–1.3)
GFR SERPL CREATININE-BSD FRML MDRD: 47 ML/MIN/1.73SQ M
GLUCOSE SERPL-MCNC: 135 MG/DL (ref 65–140)
POTASSIUM SERPL-SCNC: 3.1 MMOL/L (ref 3.5–5.3)
PROT SERPL-MCNC: 7.3 G/DL (ref 6.4–8.2)
SODIUM SERPL-SCNC: 138 MMOL/L (ref 136–145)

## 2021-05-13 PROCEDURE — 36415 COLL VENOUS BLD VENIPUNCTURE: CPT

## 2021-05-13 PROCEDURE — 80053 COMPREHEN METABOLIC PANEL: CPT

## 2021-05-25 ENCOUNTER — HOSPITAL ENCOUNTER (OUTPATIENT)
Dept: INFUSION CENTER | Facility: HOSPITAL | Age: 86
Discharge: HOME | End: 2021-05-25
Payer: MEDICARE

## 2021-05-25 VITALS
RESPIRATION RATE: 16 BRPM | WEIGHT: 141.09 LBS | HEART RATE: 69 BPM | BODY MASS INDEX: 28.44 KG/M2 | SYSTOLIC BLOOD PRESSURE: 132 MMHG | HEIGHT: 59 IN | TEMPERATURE: 97.4 F | DIASTOLIC BLOOD PRESSURE: 61 MMHG

## 2021-05-25 PROCEDURE — 96401 CHEMO ANTI-NEOPL SQ/IM: CPT

## 2021-05-25 RX ADMIN — DENOSUMAB 60 MG: 60 INJECTION SUBCUTANEOUS at 15:08

## 2021-06-02 ENCOUNTER — HOSPITAL ENCOUNTER (EMERGENCY)
Facility: HOSPITAL | Age: 86
Discharge: HOME | End: 2021-06-02
Attending: EMERGENCY MEDICINE
Payer: MEDICARE

## 2021-06-02 ENCOUNTER — APPOINTMENT (EMERGENCY)
Dept: RADIOLOGY | Facility: HOSPITAL | Age: 86
End: 2021-06-02
Payer: MEDICARE

## 2021-06-02 VITALS
TEMPERATURE: 97.2 F | OXYGEN SATURATION: 98 % | RESPIRATION RATE: 16 BRPM | HEART RATE: 66 BPM | SYSTOLIC BLOOD PRESSURE: 169 MMHG | DIASTOLIC BLOOD PRESSURE: 75 MMHG

## 2021-06-02 DIAGNOSIS — M25.562 KNEE PAIN, LEFT: Primary | ICD-10-CM

## 2021-06-02 PROCEDURE — 99283 EMERGENCY DEPT VISIT LOW MDM: CPT

## 2021-06-02 PROCEDURE — 99284 EMERGENCY DEPT VISIT MOD MDM: CPT | Performed by: EMERGENCY MEDICINE

## 2021-06-02 PROCEDURE — 73562 X-RAY EXAM OF KNEE 3: CPT

## 2021-06-02 NOTE — ED PROVIDER NOTES
History  Chief Complaint   Patient presents with    Knee Pain     Patient states she came home from shopping and her left knee was in "terrible pain and was stiff " states she iced it but it became worse  Patient presents for evaluation of left knee pain  States she was out shopping her knee felt unstable like was going to buckle and give out on her  Denies any fall or trauma  Normally ambulates with a cane  States after she got home she with some ice on a  Afterwards it was very stiff and painful to move  States she has had problems with this knee before  History provided by:  Patient   used: No    Knee Pain  Associated symptoms: no back pain, no fever and no neck pain        Prior to Admission Medications   Prescriptions Last Dose Informant Patient Reported? Taking?    Cholecalciferol (VITAMIN D) 2000 UNITS CAPS   Yes No   Sig: Take by mouth every morning   Glucosamine-Chondroitin (GLUCOSAMINE CHONDR COMPLEX PO)   Yes No   Sig: Take 1,500 mg by mouth 2 (two) times a day   Naproxen Sodium (ALEVE PO)   Yes No   Sig: Take by mouth 2 (two) times a day as needed   Omega-3 Fatty Acids (FISH OIL) 1200 MG CAPS   Yes No   Sig: Take by mouth 2 (two) times a day   gabapentin (NEURONTIN) 100 mg capsule   Yes No   Sig: Take 100 mg by mouth 2 (two) times a day   hydrochlorothiazide (HYDRODIURIL) 12 5 mg tablet   Yes No   Sig: Take 12 5 mg by mouth every morning   irbesartan (AVAPRO) 150 mg tablet   Yes No   Sig: Take 150 mg by mouth daily at bedtime   ketorolac (ACULAR) 0 5 % ophthalmic solution   No No   Sig: Administer 1 drop into the left eye 4 (four) times a day for 30 days   levothyroxine 50 mcg tablet   Yes No   Sig: Take 50 mcg by mouth every morning mon,wed   levothyroxine 75 mcg tablet   Yes No   Sig: Take 75 mcg by mouth every morning Tues,thurs,fri, sat, sun   loratadine (CLARITIN) 10 mg tablet   Yes No   Sig: Take 10 mg by mouth every morning   nebivolol (BYSTOLIC) 10 mg tablet Yes No   Sig: Take 10 mg by mouth every morning   pantoprazole (PROTONIX) 40 mg tablet   Yes No   Sig: Take 40 mg by mouth every morning   potassium chloride (K-DUR) 10 mEq tablet   Yes No   Sig: Take 10 mEq by mouth every morning   simvastatin (ZOCOR) 10 mg tablet   Yes No   Sig: Take 10 mg by mouth daily at bedtime      Facility-Administered Medications: None       Past Medical History:   Diagnosis Date    Acid reflux     Arthritis     DDD (degenerative disc disease), lumbar     Disease of thyroid gland     hypo    History of transfusion     many years ago-1940s (after fetal demise-very early pregnancy)    Hyperlipidemia     Hypertension     Restless leg        Past Surgical History:   Procedure Laterality Date    APPENDECTOMY      BREAST SURGERY Left     biopsy    CATARACT EXTRACTION W/ INTRAOCULAR LENS IMPLANT Left 10/24/2016    Procedure: EXTRACTION EXTRACAPSULAR CATARACT PHACO INTRAOCULAR LENS (IOL); Surgeon: Raj Meadows MD;  Location: [facility] MAIN OR;  Service:    [facility] UTERUS      6298'Q    HEMORRHOID SURGERY  2009    HYSTERECTOMY  1950    with right oophorectomy    AL XCAPSL CTRC RMVL INSJ IO LENS PROSTH W/O ECP Right 2016    Procedure: EXTRACTION EXTRACAPSULAR CATARACT PHACO INTRAOCULAR LENS (IOL); Surgeon: Raj Meadows MD;  Location: [facility] MAIN OR;  Service: Ophthalmology    SKIN BIOPSY      exc of the [id number]    TOE SURGERY Right     great toe removal of bone, and between the toes cyst removal    TONSILLECTOMY      TUBAL LIGATION         Family History   Problem Relation Age of Onset    Heart disease Father 47        MI     I have reviewed and agree with the history as documented      E-Cigarette/Vaping    E-Cigarette Use Never User      E-Cigarette/Vaping Substances     Social History     Tobacco Use    Smoking status: Former Smoker     Types: Cigarettes     Quit date:      Years since quittin 4    Smokeless tobacco: Never Used    Tobacco comment: social   Substance Use Topics    Alcohol use: Never     Frequency: Never    Drug use: Never       Review of Systems   Constitutional: Negative for chills and fever  HENT: Negative for congestion and sore throat  Respiratory: Negative for cough and shortness of breath  Cardiovascular: Negative for chest pain and leg swelling  Gastrointestinal: Negative for abdominal pain, nausea and vomiting  Genitourinary: Negative for difficulty urinating and dysuria  Musculoskeletal: Positive for arthralgias  Negative for back pain and neck pain  Skin: Negative for rash  Neurological: Negative for weakness, numbness and headaches  All other systems reviewed and are negative  Physical Exam  Physical Exam  Vitals signs and nursing note reviewed  Constitutional:       General: She is not in acute distress  Appearance: Normal appearance  HENT:      Head: Atraumatic  Right Ear: External ear normal       Left Ear: External ear normal       Nose: Nose normal       Mouth/Throat:      Mouth: Mucous membranes are moist       Pharynx: Oropharynx is clear  Eyes:      General: No scleral icterus  Conjunctiva/sclera: Conjunctivae normal    Cardiovascular:      Rate and Rhythm: Normal rate and regular rhythm  Pulses: Normal pulses  Pulmonary:      Effort: Pulmonary effort is normal  No respiratory distress  Breath sounds: Normal breath sounds  Abdominal:      General: Abdomen is flat  Bowel sounds are normal  There is no distension  Palpations: Abdomen is soft  Tenderness: There is no abdominal tenderness  There is no guarding or rebound  Musculoskeletal: Normal range of motion  General: No swelling, tenderness or deformity  Right lower leg: No edema  Left lower leg: No edema  Legs:    Skin:     Capillary Refill: Capillary refill takes less than 2 seconds  Findings: No rash     Neurological:      General: No focal deficit present  Mental Status: She is alert and oriented to person, place, and time  Vital Signs  ED Triage Vitals [06/02/21 1532]   Temperature Pulse Respirations Blood Pressure SpO2   (!) 97 2 °F (36 2 °C) 67 16 170/70 97 %      Temp Source Heart Rate Source Patient Position - Orthostatic VS BP Location FiO2 (%)   Tympanic Monitor Lying Right arm --      Pain Score       --           Vitals:    06/02/21 1532 06/02/21 1600   BP: 170/70 169/75   Pulse: 67 66   Patient Position - Orthostatic VS: Lying Lying         Visual Acuity      ED Medications  Medications - No data to display    Diagnostic Studies  Results Reviewed     None                 XR knee 3 views left non injury    (Results Pending)              Procedures  Procedures         ED Course                             SBIRT 22yo+      Most Recent Value   SBIRT (24 yo +)   In order to provide better care to our patients, we are screening all of our patients for alcohol and drug use  Would it be okay to ask you these screening questions? No Filed at: 06/02/2021 1508                    MDM  Number of Diagnoses or Management Options  Knee pain, left:   Diagnosis management comments: Pulse ox 98% on room air indicating adequate oxygenation  Xray L knee:  No fracture dislocation as read by me      Patient given the need blurry stool and recommended outpatient orthopedic follow-up  Patient has a walker home         Amount and/or Complexity of Data Reviewed  Tests in the radiology section of CPT®: ordered and reviewed  Decide to obtain previous medical records or to obtain history from someone other than the patient: yes  Review and summarize past medical records: yes  Independent visualization of images, tracings, or specimens: yes    Patient Progress  Patient progress: stable      Disposition  Final diagnoses:   Knee pain, left     Time reflects when diagnosis was documented in both MDM as applicable and the Disposition within this note     Time User Action Codes Description Comment    6/2/2021  4:35 PM Adam Ugalde Add [M25 [id number]] Knee pain, left       ED Disposition     ED Disposition Condition Date/Time Comment    Discharge Stable Wed Jun 2, 2021  4:35 PM Yahir Hoover discharge to home/self care              Follow-up Information     Follow up With Specialties Details Why Contact Info    Дмитрий Ayala MD Orthopedic Surgery In 1 week  [address] [facility]  [address]  [phone]            Discharge Medication List as of 6/2/2021  4:36 PM      CONTINUE these medications which have NOT CHANGED    Details   Cholecalciferol (VITAMIN D) 2000 UNITS CAPS Take by mouth every morning, Until Discontinued, Historical Med      gabapentin (NEURONTIN) 100 mg capsule Take 100 mg by mouth 2 (two) times a day, Until Discontinued, Historical Med      Glucosamine-Chondroitin (GLUCOSAMINE CHONDR COMPLEX PO) Take 1,500 mg by mouth 2 (two) times a day, Until Discontinued, Historical Med      hydrochlorothiazide (HYDRODIURIL) 12 5 mg tablet Take 12 5 mg by mouth every morning, Until Discontinued, Historical Med      irbesartan (AVAPRO) 150 mg tablet Take 150 mg by mouth daily at bedtime, Until Discontinued, Historical Med      ketorolac (ACULAR) 0 5 % ophthalmic solution Administer 1 drop into the left eye 4 (four) times a day for 30 days, Starting 10/24/2016, Until Wed 11/23/16, No Print      !! levothyroxine 50 mcg tablet Take 50 mcg by mouth every morning mon,wed, Until Discontinued, Historical Med      !! levothyroxine 75 mcg tablet Take 75 mcg by mouth every morning Tues,thurs,fri, sat, sun, Until Discontinued, Historical Med      loratadine (CLARITIN) 10 mg tablet Take 10 mg by mouth every morning, Until Discontinued, Historical Med      Naproxen Sodium (ALEVE PO) Take by mouth 2 (two) times a day as needed, Until Discontinued, Historical Med      nebivolol (BYSTOLIC) 10 mg tablet Take 10 mg by mouth every morning, Until Discontinued, Historical Med      Omega-3 Fatty Acids (FISH OIL) 1200 MG CAPS Take by mouth 2 (two) times a day, Until Discontinued, Historical Med      pantoprazole (PROTONIX) 40 mg tablet Take 40 mg by mouth every morning, Until Discontinued, Historical Med      potassium chloride (K-DUR) 10 mEq tablet Take 10 mEq by mouth every morning, Until Discontinued, Historical Med      simvastatin (ZOCOR) 10 mg tablet Take 10 mg by mouth daily at bedtime, Until Discontinued, Historical Med       !! - Potential duplicate medications found  Please discuss with provider  No discharge procedures on file      PDMP Review     None          ED Provider  Electronically Signed by           Wojciech Ybarra DO  06/02/21 3156

## 2021-06-18 ENCOUNTER — OFFICE VISIT (OUTPATIENT)
Dept: OBGYN CLINIC | Facility: CLINIC | Age: 86
End: 2021-06-18
Payer: MEDICARE

## 2021-06-18 VITALS
SYSTOLIC BLOOD PRESSURE: 138 MMHG | BODY MASS INDEX: 28.22 KG/M2 | HEIGHT: 59 IN | DIASTOLIC BLOOD PRESSURE: 66 MMHG | HEART RATE: 63 BPM | WEIGHT: 140 LBS

## 2021-06-18 DIAGNOSIS — M25.561 CHRONIC PAIN OF RIGHT KNEE: ICD-10-CM

## 2021-06-18 DIAGNOSIS — M17.11 PRIMARY OSTEOARTHRITIS OF RIGHT KNEE: Primary | ICD-10-CM

## 2021-06-18 DIAGNOSIS — G89.29 CHRONIC PAIN OF RIGHT KNEE: ICD-10-CM

## 2021-06-18 PROCEDURE — 99203 OFFICE O/P NEW LOW 30 MIN: CPT | Performed by: ORTHOPAEDIC SURGERY

## 2021-06-18 PROCEDURE — 20610 DRAIN/INJ JOINT/BURSA W/O US: CPT | Performed by: ORTHOPAEDIC SURGERY

## 2021-06-18 RX ORDER — BUPIVACAINE HYDROCHLORIDE 5 MG/ML
6 INJECTION, SOLUTION EPIDURAL; INTRACAUDAL
Status: COMPLETED | OUTPATIENT
Start: 2021-06-18 | End: 2021-06-18

## 2021-06-18 RX ORDER — TRIAMCINOLONE ACETONIDE 40 MG/ML
80 INJECTION, SUSPENSION INTRA-ARTICULAR; INTRAMUSCULAR
Status: COMPLETED | OUTPATIENT
Start: 2021-06-18 | End: 2021-06-18

## 2021-06-18 RX ADMIN — BUPIVACAINE HYDROCHLORIDE 6 ML: 5 INJECTION, SOLUTION EPIDURAL; INTRACAUDAL at 10:59

## 2021-06-18 RX ADMIN — TRIAMCINOLONE ACETONIDE 80 MG: 40 INJECTION, SUSPENSION INTRA-ARTICULAR; INTRAMUSCULAR at 10:59

## 2021-06-18 NOTE — PROGRESS NOTES
Assessment/Plan:  1  Primary osteoarthritis of right knee  Brace   2  Chronic pain of right knee  Brace     Scribe Attestation    I,:  Elizabeth Reilly am acting as a scribe while in the presence of the attending physician :       I,:  Eliana Story,  personally performed the services described in this documentation    as scribed in my presence :           Ankush Elena is a pleasant 27-year-old female who presents today for initial evaluation of her left knee pain  After reviewing her imaging and performing a thorough history and physical exam I explained that she is suffering with moderate osteoarthritis in her knee  We discussed treatment options today and I offered to perform a corticosteroid injection for symptomatic relief  After discussing risks and benefits, she did elect to proceed  This was performed as documented below and was well tolerated by the patient  Post injection instructions were provided  She understands can be repeated no sooner than 3 months  She was also fit with a hinged knee brace today for use with activity  We will plan to see her back as needed  Large joint arthrocentesis: R knee  Universal Protocol:  Consent: Verbal consent obtained    Risks and benefits: risks, benefits and alternatives were discussed  Consent given by: patient  Patient understanding: patient states understanding of the procedure being performed  Site marked: the operative site was marked  Patient identity confirmed: verbally with patient    Supporting Documentation  Indications: pain   Procedure Details  Location: knee - R knee  Preparation: Patient was prepped and draped in the usual sterile fashion  Needle size: 20 G  Ultrasound guidance: no  Approach: anterolateral  Medications administered: 6 mL bupivacaine (PF) 0 5 %; 80 mg triamcinolone acetonide 40 mg/mL    Patient tolerance: patient tolerated the procedure well with no immediate complications  Dressing:  Sterile dressing applied          Subjective: Initial evaluation for left knee pain    Patient ID: Dima Mayorga is a 80 y o  female who presents today for initial evaluation of her left knee pain  At today's visit, she complains of a recent onset of activity related pain in her knee as well as stiffness and swelling following activity  This began a few weeks ago after shopping  She states that she was evaluated in the emergency department where she was given a knee immobilizer  She has not been using this  She states that she has been relatively sedentary since her evaluation  She describes diffuse discomfort about the knee and also states that she feels unsteady  She does use a cane for assistance with ambulation and uses Aleve for pain relief which does provide good benefit for her  She does report a distant history of viscosupplementation injection series  She denies any recent injury or trauma  Review of Systems   Constitutional: Positive for activity change  Negative for chills, fever and unexpected weight change  HENT: Negative for hearing loss, nosebleeds and sore throat  Eyes: Negative for pain, redness and visual disturbance  Respiratory: Negative for cough, shortness of breath and wheezing  Cardiovascular: Negative for chest pain, palpitations and leg swelling  Gastrointestinal: Negative for abdominal pain, nausea and vomiting  Endocrine: Negative for polydipsia and polyuria  Genitourinary: Negative for dysuria and hematuria  Musculoskeletal: Positive for arthralgias and myalgias  Negative for joint swelling  See HPI   Skin: Negative for rash and wound  Neurological: Negative for dizziness, numbness and headaches  Psychiatric/Behavioral: Negative for decreased concentration and suicidal ideas  The patient is not nervous/anxious            Past Medical History:   Diagnosis Date    Acid reflux     Arthritis     DDD (degenerative disc disease), lumbar     Disease of thyroid gland     hypo    History of transfusion     many years ago-1940's (after fetal demise-very early pregnancy)    Hyperlipidemia     Hypertension     Restless leg        Past Surgical History:   Procedure Laterality Date    APPENDECTOMY      BREAST SURGERY Left     biopsy    CATARACT EXTRACTION W/ INTRAOCULAR LENS IMPLANT Left 10/24/2016    Procedure: EXTRACTION EXTRACAPSULAR CATARACT PHACO INTRAOCULAR LENS (IOL); Surgeon: Dario Holliday MD;  Location: [facility] MAIN OR;  Service:    [address] UTER[address]      9558'[address]    HEMORRHOID SURGERY  2009    HYSTERECTOMY  1950    with right oophorectomy    AR XCAPSL CTRC RMVL INSJ IO LENS PROSTH W/O ECP Right 2016    Procedure: EXTRACTION EXTRACAPSULAR CATARACT PHACO INTRAOCULAR LENS (IOL);   Surgeon: Dario Holliday MD;  Location: [facility] MAIN OR;  Service: Ophthalmology    SKIN BIOPSY      exc of the [id number]    TOE SURGERY Right     great toe removal of bone, and between the toes cyst removal    TONSILLECTOMY      TUBAL LIGATION         Family History   Problem Relation Age of Onset    Heart disease Father 47        MI       Social History     Occupational History    Not on file   Tobacco Use    Smoking status: Former Smoker     Types: Cigarettes     Quit date:      Years since quittin 4    Smokeless tobacco: Never Used    Tobacco comment: social   Vaping Use    Vaping Use: Never used   Substance and Sexual Activity    Alcohol use: Never    Drug use: Never    Sexual activity: Not on file         Current Outpatient Medications:     Cholecalciferol (VITAMIN D) 2000 UNITS CAPS, Take by mouth every morning, Disp: , Rfl:     gabapentin (NEURONTIN) 100 mg capsule, Take 100 mg by mouth 2 (two) times a day, Disp: , Rfl:     Glucosamine-Chondroitin (GLUCOSAMINE CHONDR COMPLEX PO), Take 1,500 mg by mouth 2 (two) times a day, Disp: , Rfl:     hydrochlorothiazide (HYDRODIURIL) 12 5 mg tablet, Take 12 5 mg by mouth every morning, Disp: , Rfl:     irbesartan (AVAPRO) 150 mg tablet, Take 150 mg by mouth daily at bedtime, Disp: , Rfl:     ketorolac (ACULAR) 0 5 % ophthalmic solution, Administer 1 drop into the left eye 4 (four) times a day for 30 days, Disp: 5 mL, Rfl: 0    levothyroxine 50 mcg tablet, Take 50 mcg by mouth every morning mon,wed, Disp: , Rfl:     levothyroxine 75 mcg tablet, Take 75 mcg by mouth every morning Tues,thurs,fri, sat, sun, Disp: , Rfl:     loratadine (CLARITIN) 10 mg tablet, Take 10 mg by mouth every morning, Disp: , Rfl:     Naproxen Sodium (ALEVE PO), Take by mouth 2 (two) times a day as needed, Disp: , Rfl:     nebivolol (BYSTOLIC) 10 mg tablet, Take 10 mg by mouth every morning, Disp: , Rfl:     Omega-3 Fatty Acids (FISH OIL) 1200 MG CAPS, Take by mouth 2 (two) times a day, Disp: , Rfl:     pantoprazole (PROTONIX) 40 mg tablet, Take 40 mg by mouth every morning, Disp: , Rfl:     potassium chloride (K-DUR) 10 mEq tablet, Take 10 mEq by mouth every morning, Disp: , Rfl:     simvastatin (ZOCOR) 10 mg tablet, Take 10 mg by mouth daily at bedtime, Disp: , Rfl:     Allergies   Allergen Reactions    Allegra [Fexofenadine] Itching    Sulfa Antibiotics GI Intolerance     N/V    Aspirin Rash     Only to baby aspirin       Objective:  Vitals:    06/18/21 1018   BP: 138/66   Pulse: 63       Body mass index is 28 28 kg/m²  Left Knee Exam     Tenderness   The patient is experiencing tenderness in the medial joint line, lateral joint line and patella  Range of Motion   Left knee extension: 0  Left knee flexion: 120       Tests   Chucho:  Medial - negative Lateral - negative  Varus: negative Valgus: negative  Drawer:  Anterior - negative     Posterior - negative  Patellar apprehension: negative    Other   Erythema: absent  Scars: absent  Sensation: normal  Pulse: present  Swelling: none  Effusion: effusion (scant) present    Comments:  Stable at 0, 30 and 90 degrees  Neurovascularly in tact distally  No warmth, erythema or signs of infection  Parapatellar crepitance noted  Patellofemoral grind: positive  Generally uncomfortable with motion of the knee          Observations   Left Knee   Positive for effusion (scant)  Physical Exam  Vitals and nursing note reviewed  Constitutional:       Appearance: She is well-developed  HENT:      Head: Normocephalic and atraumatic  Eyes:      General: No scleral icterus  Conjunctiva/sclera: Conjunctivae normal    Cardiovascular:      Rate and Rhythm: Normal rate  Pulmonary:      Effort: Pulmonary effort is normal  No respiratory distress  Musculoskeletal:      Cervical back: Normal range of motion and neck supple  Left knee: Effusion (scant) present  Instability Tests: Medial Chucho test negative and lateral Chucho test negative  Comments: As noted in HPI   Skin:     General: Skin is warm and dry  Neurological:      Mental Status: She is alert and oriented to person, place, and time  Psychiatric:         Behavior: Behavior normal          I have personally reviewed pertinent films in PACS  x-ray of the right knee obtained on 06/02/2021 reviewed demonstrating moderate degenerative change with tricompartmental joint space narrowing  There is marginal osteophytosis  There is no acute fracture, dislocation, lytic or blastic lesion

## 2021-10-19 PROCEDURE — U0005 INFEC AGEN DETEC AMPLI PROBE: HCPCS | Performed by: INTERNAL MEDICINE

## 2021-10-19 PROCEDURE — U0003 INFECTIOUS AGENT DETECTION BY NUCLEIC ACID (DNA OR RNA); SEVERE ACUTE RESPIRATORY SYNDROME CORONAVIRUS 2 (SARS-COV-2) (CORONAVIRUS DISEASE [COVID-19]), AMPLIFIED PROBE TECHNIQUE, MAKING USE OF HIGH THROUGHPUT TECHNOLOGIES AS DESCRIBED BY CMS-2020-01-R: HCPCS | Performed by: INTERNAL MEDICINE

## 2021-11-15 ENCOUNTER — APPOINTMENT (OUTPATIENT)
Dept: LAB | Facility: CLINIC | Age: 86
End: 2021-11-15
Payer: MEDICARE

## 2021-12-02 ENCOUNTER — HOSPITAL ENCOUNTER (OUTPATIENT)
Dept: INFUSION CENTER | Facility: HOSPITAL | Age: 86
Discharge: HOME | End: 2021-12-02
Payer: MEDICARE

## 2021-12-02 VITALS
WEIGHT: 141.98 LBS | OXYGEN SATURATION: 94 % | RESPIRATION RATE: 20 BRPM | BODY MASS INDEX: 28.68 KG/M2 | HEART RATE: 75 BPM | TEMPERATURE: 97.4 F | DIASTOLIC BLOOD PRESSURE: 62 MMHG | SYSTOLIC BLOOD PRESSURE: 139 MMHG

## 2021-12-02 PROCEDURE — 96401 CHEMO ANTI-NEOPL SQ/IM: CPT

## 2021-12-02 RX ADMIN — DENOSUMAB 60 MG: 60 INJECTION SUBCUTANEOUS at 14:19

## 2021-12-10 ENCOUNTER — HOSPITAL ENCOUNTER (OUTPATIENT)
Dept: NON INVASIVE DIAGNOSTICS | Facility: HOSPITAL | Age: 86
Discharge: HOME | End: 2021-12-10
Attending: INTERNAL MEDICINE
Payer: MEDICARE

## 2021-12-10 VITALS
SYSTOLIC BLOOD PRESSURE: 162 MMHG | HEART RATE: 71 BPM | HEIGHT: 59 IN | BODY MASS INDEX: 28.22 KG/M2 | WEIGHT: 140 LBS | DIASTOLIC BLOOD PRESSURE: 71 MMHG

## 2021-12-10 DIAGNOSIS — I35.0 AORTIC VALVE STENOSIS, ETIOLOGY OF CARDIAC VALVE DISEASE UNSPECIFIED: ICD-10-CM

## 2021-12-10 DIAGNOSIS — R01.1 SYSTOLIC MURMUR: ICD-10-CM

## 2021-12-10 LAB
AORTIC ROOT: 2.7 CM
AORTIC VALVE MEAN VELOCITY: 15.3 M/S
APICAL FOUR CHAMBER EJECTION FRACTION: 59 %
AV AREA BY CONTINUOUS VTI: 1.2 CM2
AV AREA PEAK VELOCITY: 1.2 CM2
AV LVOT MEAN GRADIENT: 2 MMHG
AV LVOT PEAK GRADIENT: 4 MMHG
AV MEAN GRADIENT: 10 MMHG
AV PEAK GRADIENT: 20 MMHG
AV VALVE AREA: 1.23 CM2
DOP CALC AO VTI: 55.59 CM
DOP CALC LVOT AREA: 2.83 CM2
DOP CALC LVOT DIAMETER: 1.9 CM
DOP CALC LVOT PEAK VEL VTI: 24.2 CM
DOP CALC LVOT PEAK VEL: 0.97 M/S
DOP CALC LVOT STROKE INDEX: 44.7 ML/M2
DOP CALC LVOT STROKE VOLUME: 68.58 CM3
E WAVE DECELERATION TIME: 149 MS
FRACTIONAL SHORTENING: 26 % (ref 28–44)
INTERVENTRICULAR SEPTUM IN DIASTOLE (PARASTERNAL SHORT AXIS VIEW): 1.1 CM
LEFT ATRIUM AREA SYSTOLE SINGLE PLANE A4C: 18.8 CM2
LEFT INTERNAL DIMENSION IN SYSTOLE: 3.1 CM (ref 2.1–4)
LEFT VENTRICULAR INTERNAL DIMENSION IN DIASTOLE: 4.2 CM (ref 3.8–5.65)
LEFT VENTRICULAR POSTERIOR WALL IN END DIASTOLE: 1.1 CM
LEFT VENTRICULAR STROKE VOLUME: 39 ML
MV E'TISSUE VEL-LAT: 11 CM/S
MV E'TISSUE VEL-SEP: 7 CM/S
MV PEAK A VEL: 1.18 M/S
MV PEAK E VEL: 125 CM/S
MV STENOSIS PRESSURE HALF TIME: 0 MS
PULMONARY REGURGITATION LATE DIASTOLIC VELOCITY: 0.01 M/S
PV PEAK GRADIENT: 6 MMHG
RA PRESSURE ESTIMATED: 8 MMHG
RIGHT ATRIUM AREA SYSTOLE A4C: 13.6 CM2
RIGHT VENTRICLE ID DIMENSION: 3.2 CM
RV PSP: 31 MMHG
SL CV LV EF: 55
SL CV PED ECHO LEFT VENTRICLE DIASTOLIC VOLUME (MOD BIPLANE) 2D: 76 ML
SL CV PED ECHO LEFT VENTRICLE SYSTOLIC VOLUME (MOD BIPLANE) 2D: 38 ML
TRICUSPID VALVE PEAK REGURGITATION VELOCITY: 2.42 M/S
TRICUSPID VALVE S': 1 CM/S
TV PEAK GRADIENT: 23 MMHG
Z-SCORE OF LEFT VENTRICULAR DIMENSION IN END SYSTOLE: -0.98

## 2021-12-10 PROCEDURE — 93306 TTE W/DOPPLER COMPLETE: CPT | Performed by: INTERNAL MEDICINE

## 2021-12-10 PROCEDURE — 93306 TTE W/DOPPLER COMPLETE: CPT

## 2021-12-13 ENCOUNTER — APPOINTMENT (OUTPATIENT)
Dept: LAB | Facility: CLINIC | Age: 86
End: 2021-12-13
Payer: MEDICARE

## 2022-05-12 ENCOUNTER — OFFICE VISIT (OUTPATIENT)
Dept: OBGYN CLINIC | Facility: CLINIC | Age: 87
End: 2022-05-12
Payer: MEDICARE

## 2022-05-12 VITALS
DIASTOLIC BLOOD PRESSURE: 68 MMHG | SYSTOLIC BLOOD PRESSURE: 140 MMHG | BODY MASS INDEX: 28.22 KG/M2 | HEART RATE: 70 BPM | HEIGHT: 59 IN | WEIGHT: 140 LBS

## 2022-05-12 DIAGNOSIS — M17.12 PRIMARY LOCALIZED OSTEOARTHRITIS OF LEFT KNEE: Primary | ICD-10-CM

## 2022-05-12 DIAGNOSIS — G89.29 CHRONIC PAIN OF LEFT KNEE: ICD-10-CM

## 2022-05-12 DIAGNOSIS — M25.562 CHRONIC PAIN OF LEFT KNEE: ICD-10-CM

## 2022-05-12 PROCEDURE — 20610 DRAIN/INJ JOINT/BURSA W/O US: CPT | Performed by: ORTHOPAEDIC SURGERY

## 2022-05-12 PROCEDURE — 99214 OFFICE O/P EST MOD 30 MIN: CPT | Performed by: ORTHOPAEDIC SURGERY

## 2022-05-12 NOTE — PROGRESS NOTES
Assessment/Plan:  1  Primary localized osteoarthritis of left knee  Large joint arthrocentesis: L knee   2  Chronic pain of left knee  Large joint arthrocentesis: L knee       89 F left knee osteoarthritis causing chronic left knee pain that has responded to left knee visco injections in the past  She has tried steroid injections most recently about 11 months ago that she states did not help her pain  She was offered accepted and provided with left total knee visco injection today  Follow up in 6 months as needed for repeat injections if symptoms worsen or fail to improve  Large joint arthrocentesis: L knee  Universal Protocol:  Consent: Verbal consent obtained  Consent given by: patient    Supporting Documentation  Indications: pain   Procedure Details  Location: knee - L knee  Needle size: 20 G  Ultrasound guidance: no  Approach: superior  Medications administered: 48 mg hylan 48 MG/6ML    Patient tolerance: patient tolerated the procedure well with no immediate complications  Dressing:  Sterile dressing applied        Subjective: left knee pain    Patient ID: Tan Quezada is a 80 y o  female  Chronic left knee pain that started years ago located medial joint line aching in character moderate in severity slowly worsening with time over the last few years  Pain is activity and weight bearing related but does not radiate  No knee swelling redness no new numbness or tingling to extremity  She has tried steroid injections to left knee in the past most recently 6/2021 that only gave her a few weeks of relief  She has done left knee visco supplementation which did provide her with months to years in the past  She does not have a history of diabetes take no blood thinners, no recent infections  Review of Systems   Constitutional: Negative for chills and fever  HENT: Negative for ear pain, sinus pain and sore throat  Eyes: Negative for pain and visual disturbance     Respiratory: Negative for cough and shortness of breath  Cardiovascular: Negative for chest pain  Gastrointestinal: Negative for abdominal pain, nausea and vomiting  Endocrine: Negative for cold intolerance and heat intolerance  Genitourinary: Negative for difficulty urinating and dysuria  Skin: Negative for rash and wound  Allergic/Immunologic: Negative for environmental allergies and food allergies  Neurological: Negative for weakness, numbness and headaches  Hematological: Does not bruise/bleed easily  Past Medical History:   Diagnosis Date    Acid reflux     Arthritis     DDD (degenerative disc disease), lumbar     Disease of thyroid gland     hypo    History of transfusion     many years ago-194s (after fetal demise-very early pregnancy)    Hyperlipidemia     Hypertension     Restless leg        Past Surgical History:   Procedure Laterality Date    APPENDECTOMY      BREAST SURGERY Left     biopsy    CATARACT EXTRACTION W/ INTRAOCULAR LENS IMPLANT Left 10/24/2016    Procedure: EXTRACTION EXTRACAPSULAR CATARACT PHACO INTRAOCULAR LENS (IOL); Surgeon: Rachel Benjamin MD;  Location: [facility] OR;  Service:    [facility] 7 UTERUS      5802'F    HEMORRHOID SURGERY      HYSTERECTOMY  1950    with right oophorectomy    UT XCAPSL CTRC RMVL INSJ IO LENS PROSTH W/O ECP Right 2016    Procedure: EXTRACTION EXTRACAPSULAR CATARACT PHACO INTRAOCULAR LENS (IOL);   Surgeon: Rachel Benjamin MD;  Location: [facility] OR;  Service: Ophthalmology    SKIN BIOPSY      exc of the [facility]-1472'L    TOE SURGERY Right     great toe removal of bone, and between the toes cyst removal    TONSILLECTOMY      TUBAL LIGATION         Family History   Problem Relation Age of Onset    Heart disease Father 47        MI       Social History     Occupational History    Not on file   Tobacco Use    Smoking status: Former Smoker     Types: Cigarettes     Quit date:      Years since quittin 3    Smokeless tobacco: Never Used    Tobacco comment: social   Vaping Use    Vaping Use: Never used   Substance and Sexual Activity    Alcohol use: Never    Drug use: Never    Sexual activity: Not on file         Current Outpatient Medications:     Cholecalciferol (VITAMIN D) 2000 UNITS CAPS, Take by mouth every morning, Disp: , Rfl:     gabapentin (NEURONTIN) 100 mg capsule, Take 100 mg by mouth 2 (two) times a day, Disp: , Rfl:     Glucosamine-Chondroitin (GLUCOSAMINE CHONDR COMPLEX PO), Take 1,500 mg by mouth 2 (two) times a day, Disp: , Rfl:     hydrochlorothiazide (HYDRODIURIL) 12 5 mg tablet, Take 12 5 mg by mouth every morning, Disp: , Rfl:     irbesartan (AVAPRO) 150 mg tablet, Take 150 mg by mouth daily at bedtime, Disp: , Rfl:     ketorolac (ACULAR) 0 5 % ophthalmic solution, Administer 1 drop into the left eye 4 (four) times a day for 30 days, Disp: 5 mL, Rfl: 0    levothyroxine 50 mcg tablet, Take 50 mcg by mouth every morning mon,wed, Disp: , Rfl:     levothyroxine 75 mcg tablet, Take 75 mcg by mouth every morning Tues,thurs,fri, sat, sun, Disp: , Rfl:     loratadine (CLARITIN) 10 mg tablet, Take 10 mg by mouth every morning, Disp: , Rfl:     Naproxen Sodium (ALEVE PO), Take by mouth 2 (two) times a day as needed, Disp: , Rfl:     nebivolol (BYSTOLIC) 10 mg tablet, Take 10 mg by mouth every morning, Disp: , Rfl:     Omega-3 Fatty Acids (FISH OIL) 1200 MG CAPS, Take by mouth 2 (two) times a day, Disp: , Rfl:     pantoprazole (PROTONIX) 40 mg tablet, Take 40 mg by mouth every morning, Disp: , Rfl:     potassium chloride (K-DUR) 10 mEq tablet, Take 10 mEq by mouth every morning, Disp: , Rfl:     simvastatin (ZOCOR) 10 mg tablet, Take 10 mg by mouth daily at bedtime, Disp: , Rfl:     Allergies   Allergen Reactions    Allegra [Fexofenadine] Itching    Sulfa Antibiotics GI Intolerance     N/V    Aspirin Rash     Only to baby aspirin       Objective:  Vitals:    05/12/22 1123   BP: 140/68 Pulse: 70       Body mass index is 28 28 kg/m²  Left Knee Exam     Muscle Strength   The patient has normal left knee strength  Tenderness   The patient is experiencing tenderness in the medial joint line  Range of Motion   The patient has normal left knee ROM  Tests   Varus: negative Valgus: negative  Lachman:  Anterior - negative    Posterior - negative    Other   Erythema: absent  Sensation: normal  Pulse: present  Swelling: none  Effusion: no effusion present    Comments:  Varus left knee, medial joint line tenderness, bony crepitus with rom of the knee            Observations   Left Knee   Negative for effusion  Physical Exam  Vitals reviewed  Constitutional:       Appearance: Normal appearance  HENT:      Head: Normocephalic and atraumatic  Eyes:      General: No scleral icterus  Conjunctiva/sclera: Conjunctivae normal    Cardiovascular:      Rate and Rhythm: Normal rate and regular rhythm  Pulses: Normal pulses  Pulmonary:      Effort: Pulmonary effort is normal  No respiratory distress  Breath sounds: No stridor  Musculoskeletal:         General: Normal range of motion  Left knee: No effusion  Skin:     General: Skin is warm and dry  Coloration: Skin is not jaundiced  Neurological:      General: No focal deficit present  Mental Status: She is alert  Psychiatric:         Mood and Affect: Mood normal          Behavior: Behavior normal            I have personally reviewed pertinent films in PACS  No new imaging, old films 6/2021 reviewed left knee shows left knee osteoarthritis, mild

## 2022-06-08 ENCOUNTER — APPOINTMENT (OUTPATIENT)
Dept: LAB | Facility: CLINIC | Age: 87
End: 2022-06-08
Payer: MEDICARE

## 2022-06-16 ENCOUNTER — HOSPITAL ENCOUNTER (OUTPATIENT)
Dept: INFUSION CENTER | Facility: HOSPITAL | Age: 87
Discharge: HOME | End: 2022-06-16
Payer: MEDICARE

## 2022-06-16 VITALS
TEMPERATURE: 98.6 F | BODY MASS INDEX: 27.64 KG/M2 | WEIGHT: 137.13 LBS | DIASTOLIC BLOOD PRESSURE: 63 MMHG | SYSTOLIC BLOOD PRESSURE: 141 MMHG | RESPIRATION RATE: 20 BRPM | HEIGHT: 59 IN | HEART RATE: 69 BPM | OXYGEN SATURATION: 95 %

## 2022-06-16 PROCEDURE — 96372 THER/PROPH/DIAG INJ SC/IM: CPT

## 2022-06-16 RX ADMIN — DENOSUMAB 60 MG: 60 INJECTION SUBCUTANEOUS at 12:08

## 2022-10-26 ENCOUNTER — OFFICE VISIT (OUTPATIENT)
Dept: FAMILY MEDICINE CLINIC | Facility: CLINIC | Age: 87
End: 2022-10-26
Payer: MEDICARE

## 2022-10-26 VITALS
BODY MASS INDEX: 26.6 KG/M2 | WEIGHT: 135.5 LBS | OXYGEN SATURATION: 94 % | SYSTOLIC BLOOD PRESSURE: 134 MMHG | DIASTOLIC BLOOD PRESSURE: 80 MMHG | HEIGHT: 60 IN | HEART RATE: 75 BPM

## 2022-10-26 DIAGNOSIS — Z13.0 SCREENING FOR DEFICIENCY ANEMIA: ICD-10-CM

## 2022-10-26 DIAGNOSIS — K21.9 GASTROESOPHAGEAL REFLUX DISEASE WITHOUT ESOPHAGITIS: ICD-10-CM

## 2022-10-26 DIAGNOSIS — M15.9 PRIMARY OSTEOARTHRITIS INVOLVING MULTIPLE JOINTS: ICD-10-CM

## 2022-10-26 DIAGNOSIS — I10 PRIMARY HYPERTENSION: ICD-10-CM

## 2022-10-26 DIAGNOSIS — E03.9 ACQUIRED HYPOTHYROIDISM: ICD-10-CM

## 2022-10-26 DIAGNOSIS — E78.5 DYSLIPIDEMIA: ICD-10-CM

## 2022-10-26 DIAGNOSIS — R73.03 PRE-DIABETES: ICD-10-CM

## 2022-10-26 DIAGNOSIS — Z23 ENCOUNTER FOR IMMUNIZATION: Primary | ICD-10-CM

## 2022-10-26 PROCEDURE — 90662 IIV NO PRSV INCREASED AG IM: CPT | Performed by: INTERNAL MEDICINE

## 2022-10-26 PROCEDURE — 99214 OFFICE O/P EST MOD 30 MIN: CPT | Performed by: INTERNAL MEDICINE

## 2022-10-26 PROCEDURE — G0008 ADMIN INFLUENZA VIRUS VAC: HCPCS | Performed by: INTERNAL MEDICINE

## 2022-10-26 RX ORDER — PANTOPRAZOLE SODIUM 40 MG/1
40 TABLET, DELAYED RELEASE ORAL EVERY MORNING
Qty: 90 TABLET | Refills: 1 | Status: SHIPPED | OUTPATIENT
Start: 2022-10-26

## 2022-10-26 NOTE — ASSESSMENT & PLAN NOTE
Patient with hypothyroidism currently taking levothyroxine 75 mcg in the morning last TSH level was 0 422 will continue with the same dose and follow-up with the a TSH level and adjust the dose if necessary

## 2022-10-26 NOTE — ASSESSMENT & PLAN NOTE
Patient with a history of hypertension currently taking Bystolic 5 mg HydroDIURIL 12 5 mg will continue with the same dose today's blood pressure is 134/80

## 2022-10-26 NOTE — ASSESSMENT & PLAN NOTE
Patient with GERD symptoms cannot lay flat in the nighttime she feels reflux symptoms coming back with the food into the throat area patient is on pantoprazole will continue with the same and keep the head end of the bed up when she is lying down

## 2022-10-26 NOTE — ASSESSMENT & PLAN NOTE
Patient with osteoarthritis of the joints especially with both the knees she had received steroid injections and gel shots still experiencing some discomfort pain and patient is taking Tylenol with some relief

## 2022-10-26 NOTE — PROGRESS NOTES
Office Visit Note  10/26/22     Charly Kessler 80 y o  female MRN: [id number]  : 1927    Assessment:     1  Encounter for immunization  -     influenza vaccine, high-dose, PF 0 5 mL    2  Gastroesophageal reflux disease without esophagitis  Assessment & Plan:  Patient with GERD symptoms cannot lay flat in the nighttime she feels reflux symptoms coming back with the food into the throat area patient is on pantoprazole will continue with the same and keep the head end of the bed up when she is lying down    Orders:  -     pantoprazole (PROTONIX) 40 mg tablet; Take 1 tablet (40 mg total) by mouth every morning    3  Primary hypertension  Assessment & Plan:  Patient with a history of hypertension currently taking Bystolic 5 mg HydroDIURIL 12 5 mg will continue with the same dose today's blood pressure is 134/80  Orders:  -     Comprehensive metabolic panel; Future    4  Primary osteoarthritis involving multiple joints  Assessment & Plan:  Patient with osteoarthritis of the joints especially with both the knees she had received steroid injections and gel shots still experiencing some discomfort pain and patient is taking Tylenol with some relief  5  Dyslipidemia  Assessment & Plan:  Patient is currently taking simvastatin 10 mg last cholesterol level is at 166 triglycerides 138 HDL 61 and LDL at 77  Will get a repeat lipid profile prior to the next visit  Orders:  -     Lipid panel; Future    6  Acquired hypothyroidism  Assessment & Plan:  Patient with hypothyroidism currently taking levothyroxine 75 mcg in the morning last TSH level was 0 422 will continue with the same dose and follow-up with the a TSH level and adjust the dose if necessary  Orders:  -     TSH, 3rd generation with Free T4 reflex; Future; Expected date: 2023    7  Screening for deficiency anemia  -     CBC and differential; Future    8   Pre-diabetes  -     Hemoglobin A1C; Future; Expected date: 2022  -     UA w Reflex to Microscopic w Reflex to Culture; Future; Expected date: 10/26/2022        Discussion Summary and Plan: Today's care plan and medications were reviewed with patient in detail and all their questions answered to their satisfaction  Chief Complaint   Patient presents with   • Follow-up     Flu vasccine      Subjective:  Patient has come for evaluation regarding her hypertension hypothyroidism and dyslipidemia  She continues to experience some pain discomfort in both the knee joints  She had received gel shots in the left knee few months back  Denies headache dizziness chest pain palpitation shortness of breath  Medications reviewed patient had lab work done last year in December reviewed daughter yearly labs  The following portions of the patient's history were reviewed and updated as appropriate: allergies, current medications, past family history, past medical history, past social history, past surgical history and problem list     Review of Systems   Constitutional: Negative for chills and fever  HENT: Negative for ear pain and sore throat  Eyes: Negative for pain and visual disturbance  Respiratory: Negative for cough and shortness of breath  Cardiovascular: Negative for chest pain and palpitations  Gastrointestinal: Negative for abdominal pain and vomiting  Genitourinary: Negative for dysuria and hematuria  Musculoskeletal: Positive for arthralgias  Negative for back pain  Skin: Negative for color change and rash  Neurological: Negative for seizures and syncope  All other systems reviewed and are negative          Historical Information   Patient Active Problem List   Diagnosis   • Primary hypertension   • Dyslipidemia   • Acquired hypothyroidism   • Gastroesophageal reflux disease without esophagitis   • Osteoarthritis of multiple joints     Past Medical History:   Diagnosis Date   • Acid reflux    • Arthritis    • DDD (degenerative disc disease), lumbar    • Disease of thyroid gland     hypo   • DJD (degenerative joint disease)    • History of transfusion     many years ago-1940's (after fetal demise-very early pregnancy)   • Hyperlipidemia    • Hypertension    • Osteoporosis    • Restless leg      Past Surgical History:   Procedure Laterality Date   • APPENDECTOMY     • BREAST SURGERY Left     biopsy   • CATARACT EXTRACTION W/ INTRAOCULAR LENS IMPLANT Left 10/24/2016    Procedure: EXTRACTION EXTRACAPSULAR CATARACT PHACO INTRAOCULAR LENS (IOL); Surgeon: Saulo Wheeler MD;  Location: [facility] MAIN OR;  Service:    • DILATION AND CURETTAGE OF UTERUS         • DXA PROCEDURE (HISTORICAL)  2020   • HEMORRHOID SURGERY     • HYSTERECTOMY  1950    with right oophorectomy   • MO XCAPSL CTRC RMVL INSJ IO LENS PROSTH W/O ECP Right 2016    Procedure: EXTRACTION EXTRACAPSULAR CATARACT PHACO INTRAOCULAR LENS (IOL);   Surgeon: Saulo Wheeler MD;  Location: [facility] MAIN OR;  Service: Ophthalmology   • SKIN BIOPSY      exc of the TK-4272'L   • TOE SURGERY Right     great toe removal of bone, and between the toes cyst removal   • TONSILLECTOMY     • TUBAL LIGATION       Social History     Substance and Sexual Activity   Alcohol Use Never     Social History     Substance and Sexual Activity   Drug Use Never     Social History     Tobacco Use   Smoking Status Former Smoker   • Types: Cigarettes   • Quit date:    • Years since quittin 8   Smokeless Tobacco Never Used   Tobacco Comment    social     Family History   Problem Relation Age of Onset   • Heart disease Father 47        MI     Health Maintenance Due   Topic   • Medicare Annual Wellness Visit (AWV)    • Pneumococcal Vaccine: 65+ Years (1 - PCV)   • Depression Screening    • BMI: Followup Plan    • Fall Risk    • Urinary Incontinence Screening    • COVID-19 Vaccine (3 - Booster for Moderna series)      Meds/Allergies       Current Outpatient Medications:   •  hydrochlorothiazide (HYDRODIURIL) 12 5 mg tablet, Take 12 5 mg by mouth every morning, Disp: , Rfl:   •  levothyroxine 75 mcg tablet, Take 75 mcg by mouth every morning Tues,thurs,fri, sat, sun, Disp: , Rfl:   •  nebivolol (BYSTOLIC) 10 mg tablet, Take 5 mg by mouth every morning, Disp: , Rfl:   •  Omega-3 Fatty Acids (FISH OIL) 1200 MG CAPS, Take by mouth 2 (two) times a day, Disp: , Rfl:   •  pantoprazole (PROTONIX) 40 mg tablet, Take 1 tablet (40 mg total) by mouth every morning, Disp: 90 tablet, Rfl: 1  •  potassium chloride (K-DUR) 10 mEq tablet, Take 10 mEq by mouth every morning, Disp: , Rfl:   •  simvastatin (ZOCOR) 10 mg tablet, Take 10 mg by mouth daily at bedtime, Disp: , Rfl:   •  Cholecalciferol (VITAMIN D) 2000 UNITS CAPS, Take by mouth every morning (Patient not taking: Reported on 10/26/2022), Disp: , Rfl:       Objective:    Vitals:   /80 (BP Location: Right arm, Patient Position: Sitting, Cuff Size: Standard)   Pulse 75   Ht 5' (1 524 m)   Wt 61 5 kg (135 lb 8 oz)   SpO2 94%   BMI 26 46 kg/m²   Body mass index is 26 46 kg/m²  Vitals:    10/26/22 1054   Weight: 61 5 kg (135 lb 8 oz)       Physical Exam  Vitals and nursing note reviewed  Constitutional:       Appearance: Normal appearance  Cardiovascular:      Rate and Rhythm: Normal rate and regular rhythm  Heart sounds: Murmur heard  Comments: Patient has systolic murmur radiating to the carotids I reviewed the echocardiogram she has taken leaflets of the aortic valve  Pulmonary:      Effort: Pulmonary effort is normal       Breath sounds: Normal breath sounds  Abdominal:      General: Abdomen is flat  Bowel sounds are normal       Palpations: Abdomen is soft  Musculoskeletal:      Cervical back: Normal range of motion and neck supple  Right lower leg: No edema  Left lower leg: No edema  Skin:     General: Skin is warm and dry  Neurological:      Mental Status: She is alert and oriented to person, place, and time           Lab Review   No visits with results within 2 Month(s) from this visit  Latest known visit with results is:   Transcribe Orders on 06/08/2022   Component Date Value Ref Range Status   • Sodium 06/08/2022 140  136 - 145 mmol/L Final   • Potassium 06/08/2022 3 7  3 5 - 5 3 mmol/L Final   • Chloride 06/08/2022 101  100 - 108 mmol/L Final   • CO2 06/08/2022 32  21 - 32 mmol/L Final   • ANION GAP 06/08/2022 7  4 - 13 mmol/L Final   • BUN 06/08/2022 20  5 - 25 mg/dL Final   • Creatinine 06/08/2022 1 00  0 60 - 1 30 mg/dL Final    Standardized to IDMS reference method   • Glucose, Fasting 06/08/2022 113 (A) 65 - 99 mg/dL Final    Specimen collection should occur prior to Sulfasalazine administration due to the potential for falsely depressed results  Specimen collection should occur prior to Sulfapyridine administration due to the potential for falsely elevated results  • Calcium 06/08/2022 9 7  8 3 - 10 1 mg/dL Final   • AST 06/08/2022 21  5 - 45 U/L Final    Specimen collection should occur prior to Sulfasalazine administration due to the potential for falsely depressed results  • ALT 06/08/2022 26  12 - 78 U/L Final    Specimen collection should occur prior to Sulfasalazine and/or Sulfapyridine administration due to the potential for falsely depressed results  • Alkaline Phosphatase 06/08/2022 37 (A) 46 - 116 U/L Final   • Total Protein 06/08/2022 7 2  6 4 - 8 2 g/dL Final   • Albumin 06/08/2022 3 6  3 5 - 5 0 g/dL Final   • Total Bilirubin 06/08/2022 0 83  0 20 - 1 00 mg/dL Final    Use of this assay is not recommended for patients undergoing treatment with eltrombopag due to the potential for falsely elevated results  • eGFR 06/08/2022 48  ml/min/1 73sq m Final         Geovanna Bruno        "This note has been constructed using a voice recognition system  Therefore there may be syntax, spelling, and/or grammatical errors   Please call if you have any questions  "

## 2022-10-26 NOTE — ASSESSMENT & PLAN NOTE
Patient is currently taking simvastatin 10 mg last cholesterol level is at 166 triglycerides 138 HDL 61 and LDL at 77  Will get a repeat lipid profile prior to the next visit

## 2022-12-02 ENCOUNTER — APPOINTMENT (OUTPATIENT)
Dept: LAB | Facility: CLINIC | Age: 87
End: 2022-12-02

## 2022-12-02 DIAGNOSIS — I10 PRIMARY HYPERTENSION: ICD-10-CM

## 2022-12-02 DIAGNOSIS — E03.9 ACQUIRED HYPOTHYROIDISM: ICD-10-CM

## 2022-12-02 DIAGNOSIS — R73.03 PRE-DIABETES: ICD-10-CM

## 2022-12-02 DIAGNOSIS — E78.5 DYSLIPIDEMIA: ICD-10-CM

## 2022-12-02 DIAGNOSIS — Z13.0 SCREENING FOR DEFICIENCY ANEMIA: ICD-10-CM

## 2022-12-02 LAB
ALBUMIN SERPL BCP-MCNC: 3.4 G/DL (ref 3.5–5)
ALP SERPL-CCNC: 36 U/L (ref 46–116)
ALT SERPL W P-5'-P-CCNC: 18 U/L (ref 12–78)
ANION GAP SERPL CALCULATED.3IONS-SCNC: 4 MMOL/L (ref 4–13)
AST SERPL W P-5'-P-CCNC: 18 U/L (ref 5–45)
BASOPHILS # BLD AUTO: 0.01 THOUSANDS/ÂΜL (ref 0–0.1)
BASOPHILS NFR BLD AUTO: 0 % (ref 0–1)
BILIRUB SERPL-MCNC: 0.61 MG/DL (ref 0.2–1)
BUN SERPL-MCNC: 27 MG/DL (ref 5–25)
CALCIUM ALBUM COR SERPL-MCNC: 10.4 MG/DL (ref 8.3–10.1)
CALCIUM SERPL-MCNC: 9.9 MG/DL (ref 8.3–10.1)
CHLORIDE SERPL-SCNC: 104 MMOL/L (ref 96–108)
CHOLEST SERPL-MCNC: 149 MG/DL
CO2 SERPL-SCNC: 31 MMOL/L (ref 21–32)
CREAT SERPL-MCNC: 1.08 MG/DL (ref 0.6–1.3)
EOSINOPHIL # BLD AUTO: 0.07 THOUSAND/ÂΜL (ref 0–0.61)
EOSINOPHIL NFR BLD AUTO: 2 % (ref 0–6)
ERYTHROCYTE [DISTWIDTH] IN BLOOD BY AUTOMATED COUNT: 13.9 % (ref 11.6–15.1)
GFR SERPL CREATININE-BSD FRML MDRD: 43 ML/MIN/1.73SQ M
GLUCOSE P FAST SERPL-MCNC: 107 MG/DL (ref 65–99)
HCT VFR BLD AUTO: 37.7 % (ref 34.8–46.1)
HDLC SERPL-MCNC: 54 MG/DL
HGB BLD-MCNC: 12.4 G/DL (ref 11.5–15.4)
IMM GRANULOCYTES # BLD AUTO: 0.01 THOUSAND/UL (ref 0–0.2)
IMM GRANULOCYTES NFR BLD AUTO: 0 % (ref 0–2)
LDLC SERPL CALC-MCNC: 62 MG/DL (ref 0–100)
LYMPHOCYTES # BLD AUTO: 1.33 THOUSANDS/ÂΜL (ref 0.6–4.47)
LYMPHOCYTES NFR BLD AUTO: 32 % (ref 14–44)
MCH RBC QN AUTO: 31.3 PG (ref 26.8–34.3)
MCHC RBC AUTO-ENTMCNC: 32.9 G/DL (ref 31.4–37.4)
MCV RBC AUTO: 95 FL (ref 82–98)
MONOCYTES # BLD AUTO: 0.33 THOUSAND/ÂΜL (ref 0.17–1.22)
MONOCYTES NFR BLD AUTO: 8 % (ref 4–12)
NEUTROPHILS # BLD AUTO: 2.38 THOUSANDS/ÂΜL (ref 1.85–7.62)
NEUTS SEG NFR BLD AUTO: 58 % (ref 43–75)
NONHDLC SERPL-MCNC: 95 MG/DL
NRBC BLD AUTO-RTO: 0 /100 WBCS
PLATELET # BLD AUTO: 177 THOUSANDS/UL (ref 149–390)
PMV BLD AUTO: 12.5 FL (ref 8.9–12.7)
POTASSIUM SERPL-SCNC: 3.8 MMOL/L (ref 3.5–5.3)
PROT SERPL-MCNC: 7.2 G/DL (ref 6.4–8.4)
RBC # BLD AUTO: 3.96 MILLION/UL (ref 3.81–5.12)
SODIUM SERPL-SCNC: 139 MMOL/L (ref 135–147)
TRIGL SERPL-MCNC: 163 MG/DL
WBC # BLD AUTO: 4.13 THOUSAND/UL (ref 4.31–10.16)

## 2022-12-03 LAB
EST. AVERAGE GLUCOSE BLD GHB EST-MCNC: 97 MG/DL
HBA1C MFR BLD: 5 %

## 2022-12-06 ENCOUNTER — APPOINTMENT (OUTPATIENT)
Dept: LAB | Facility: CLINIC | Age: 87
End: 2022-12-06

## 2022-12-06 LAB
BILIRUB UR QL STRIP: NEGATIVE
CLARITY UR: CLEAR
COLOR UR: NORMAL
GLUCOSE UR STRIP-MCNC: NEGATIVE MG/DL
HGB UR QL STRIP.AUTO: NEGATIVE
KETONES UR STRIP-MCNC: NEGATIVE MG/DL
LEUKOCYTE ESTERASE UR QL STRIP: NEGATIVE
NITRITE UR QL STRIP: NEGATIVE
PH UR STRIP.AUTO: 5.5 [PH]
PROT UR STRIP-MCNC: NEGATIVE MG/DL
SP GR UR STRIP.AUTO: 1.01 (ref 1–1.03)
T4 FREE SERPL-MCNC: 1.35 NG/DL (ref 0.76–1.46)
TSH SERPL DL<=0.05 MIU/L-ACNC: 0.45 UIU/ML (ref 0.45–4.5)
UROBILINOGEN UR STRIP-ACNC: <2 MG/DL

## 2022-12-08 ENCOUNTER — OFFICE VISIT (OUTPATIENT)
Dept: FAMILY MEDICINE CLINIC | Facility: CLINIC | Age: 87
End: 2022-12-08

## 2022-12-08 VITALS
HEART RATE: 73 BPM | OXYGEN SATURATION: 96 % | DIASTOLIC BLOOD PRESSURE: 80 MMHG | BODY MASS INDEX: 26.81 KG/M2 | HEIGHT: 59 IN | WEIGHT: 133 LBS | SYSTOLIC BLOOD PRESSURE: 136 MMHG

## 2022-12-08 DIAGNOSIS — I10 PRIMARY HYPERTENSION: Primary | ICD-10-CM

## 2022-12-08 DIAGNOSIS — K21.9 GASTROESOPHAGEAL REFLUX DISEASE WITHOUT ESOPHAGITIS: ICD-10-CM

## 2022-12-08 DIAGNOSIS — M81.0 AGE-RELATED OSTEOPOROSIS WITHOUT CURRENT PATHOLOGICAL FRACTURE: ICD-10-CM

## 2022-12-08 DIAGNOSIS — M15.9 PRIMARY OSTEOARTHRITIS INVOLVING MULTIPLE JOINTS: ICD-10-CM

## 2022-12-08 DIAGNOSIS — E78.5 DYSLIPIDEMIA: ICD-10-CM

## 2022-12-08 DIAGNOSIS — E03.9 ACQUIRED HYPOTHYROIDISM: ICD-10-CM

## 2022-12-08 RX ORDER — NEBIVOLOL 10 MG/1
5 TABLET ORAL EVERY MORNING
Qty: 90 TABLET | Refills: 1 | Status: SHIPPED | OUTPATIENT
Start: 2022-12-08

## 2022-12-08 RX ORDER — AMLODIPINE BESYLATE 5 MG/1
5 TABLET ORAL DAILY
COMMUNITY
Start: 2022-10-19

## 2022-12-08 NOTE — PROGRESS NOTES
Office Visit Note  22     Sahra Dillard 80 y o  female MRN: [id number]  : 1927    Assessment:     1  Primary hypertension  Assessment & Plan:  Patient's blood pressure today is 136/80 currently she is taking Bystolic 5 mg HydroDIURIL 12 5 mg and amlodipine 5 mg daily we will continue with the same  2  Acquired hypothyroidism  Assessment & Plan:  Patient is currently taking levothyroxine 75 mcg daily last TSH level is 0 449 slightly low T4 is normal we will continue with current dose of 75 mcg for now follow-up with repeat labs at later date  3  Dyslipidemia  Assessment & Plan:  Patient is currently taking simvastatin 10 mg at bedtime lab reveals her cholesterol at 149 triglycerides 163 HDL is 54 and LDL of 62 we will continue with the current dose  4  Gastroesophageal reflux disease without esophagitis  Assessment & Plan:  Patient with GERD symptoms currently taking pantoprazole 40 mg every day  Recommend to watch the diet not to eat late and keep the head end up      5  Primary osteoarthritis involving multiple joints  Assessment & Plan:  Patient with osteoarthritis of the knees taking Tylenol as needed she had received steroid injections in the past   Pain at times is more uses walker to ambulate  6  Age-related osteoporosis without current pathological fracture  Assessment & Plan:  Patient with age-related osteoporosis on Prolia injections patient at present time decided not to have any more injections  Explained to the patient the need but still she does not want it will monitor closely for now  Discussion Summary and Plan: Today's care plan and medications were reviewed with patient in detail and all their questions answered to their satisfaction  Chief Complaint   Patient presents with   • Follow-up      Subjective:  Patient has come in for evaluation regarding her hypertension, hypothyroidism, dyslipidemia    Patient continues to experience pain in the knee joints on and off ambulates with a walker  She has a history of osteoporosis currently on Prolia injections every 6 months however patient does not want to continue that medication at this time  She denies any chest pains palpitation shortness of breath  Her appetite is fair  The following portions of the patient's history were reviewed and updated as appropriate: allergies, current medications, past family history, past medical history, past social history, past surgical history and problem list     Review of Systems   Constitutional: Negative for chills and fever  HENT: Negative for ear pain and sore throat  Eyes: Negative for pain and visual disturbance  Respiratory: Negative for cough and shortness of breath  Cardiovascular: Negative for chest pain and palpitations  Gastrointestinal: Negative for abdominal pain and vomiting  Genitourinary: Negative for dysuria and hematuria  Musculoskeletal: Positive for arthralgias and back pain  Skin: Negative for color change and rash  Neurological: Negative for seizures and syncope  All other systems reviewed and are negative          Historical Information   Patient Active Problem List   Diagnosis   • Primary hypertension   • Dyslipidemia   • Acquired hypothyroidism   • Gastroesophageal reflux disease without esophagitis   • Osteoarthritis of multiple joints   • Age-related osteoporosis without current pathological fracture     Past Medical History:   Diagnosis Date   • Acid reflux    • Arthritis    • DDD (degenerative disc disease), lumbar    • Disease of thyroid gland     hypo   • DJD (degenerative joint disease)    • History of transfusion     many years ago-1940's (after fetal demise-very early pregnancy)   • Hyperlipidemia    • Hypertension    • Osteoporosis    • Restless leg      Past Surgical History:   Procedure Laterality Date   • APPENDECTOMY     • BREAST SURGERY Left     biopsy   • CATARACT EXTRACTION W/ INTRAOCULAR LENS IMPLANT Left 10/24/2016    Procedure: EXTRACTION EXTRACAPSULAR CATARACT PHACO INTRAOCULAR LENS (IOL); Surgeon: James Osorio MD;  Location: [facility] MAIN OR;  Service:    • DILATION AND CURETTAGE OF UTERUS         • DXA PROCEDURE (HISTORICAL)  2020   • HEMORRHOID SURGERY  2009   • HYSTERECTOMY  1950    with right oophorectomy   • SD XCAPSL CTRC RMVL INSJ IO LENS PROSTH W/O ECP Right 2016    Procedure: EXTRACTION EXTRACAPSULAR CATARACT PHACO INTRAOCULAR LENS (IOL);   Surgeon: James Osorio MD;  Location: [facility] MAIN OR;  Service: Ophthalmology   • SKIN BIOPSY      exc of the [id number]   • TOE SURGERY Right     great toe removal of bone, and between the toes cyst removal   • TONSILLECTOMY     • TUBAL LIGATION       Social History     Substance and Sexual Activity   Alcohol Use Never     Social History     Substance and Sexual Activity   Drug Use Never     Social History     Tobacco Use   Smoking Status Former   • Types: Cigarettes   • Quit date:    • Years since quittin 9   Smokeless Tobacco Never   Tobacco Comments    social     Family History   Problem Relation Age of Onset   • Heart disease Father 47        MI     Health Maintenance Due   Topic   • Medicare Annual Wellness Visit (AWV)    • Hepatitis B Vaccine (1 of 3 - 3-dose series)   • Pneumococcal Vaccine: 65+ Years (1 - PCV)   • Depression Screening    • BMI: Followup Plan    • Fall Risk    • Urinary Incontinence Screening    • COVID-19 Vaccine (3 - Booster for Moderna series)      Meds/Allergies       Current Outpatient Medications:   •  amLODIPine (NORVASC) 5 mg tablet, Take 5 mg by mouth in the morning, Disp: , Rfl:   •  hydrochlorothiazide (HYDRODIURIL) 12 5 mg tablet, Take 12 5 mg by mouth every morning, Disp: , Rfl:   •  levothyroxine 75 mcg tablet, Take 75 mcg by mouth every morning Tues,th,fri, sat, sun, Disp: , Rfl:   •  nebivolol (BYSTOLIC) 10 mg tablet, Take 0 5 tablets (5 mg total) by mouth every morning, Disp: 90 tablet, Rfl: 1  •  Omega-3 Fatty Acids (FISH OIL) 1200 MG CAPS, Take by mouth 2 (two) times a day, Disp: , Rfl:   •  pantoprazole (PROTONIX) 40 mg tablet, Take 1 tablet (40 mg total) by mouth every morning, Disp: 90 tablet, Rfl: 1  •  potassium chloride (K-DUR) 10 mEq tablet, Take 10 mEq by mouth every morning, Disp: , Rfl:   •  simvastatin (ZOCOR) 10 mg tablet, Take 10 mg by mouth daily at bedtime, Disp: , Rfl:       Objective:    Vitals:   /80 (BP Location: Right arm, Patient Position: Sitting, Cuff Size: Standard)   Pulse 73   Ht 4' 11" (1 499 m) Comment: per patient  Wt 60 3 kg (133 lb)   SpO2 96%   BMI 26 86 kg/m²   Body mass index is 26 86 kg/m²  Vitals:    12/08/22 1155   Weight: 60 3 kg (133 lb)       Physical Exam  Vitals and nursing note reviewed  Constitutional:       Appearance: Normal appearance  Cardiovascular:      Rate and Rhythm: Normal rate and regular rhythm  Heart sounds: Normal heart sounds  Pulmonary:      Effort: Pulmonary effort is normal       Breath sounds: Normal breath sounds  Abdominal:      General: Abdomen is flat  Palpations: Abdomen is soft  Musculoskeletal:      Cervical back: Normal range of motion and neck supple  Right lower leg: No edema  Left lower leg: No edema  Comments: Knee joint movements causing discomfort and pain  Neurological:      Mental Status: She is alert and oriented to person, place, and time           Lab Review   Appointment on 12/02/2022   Component Date Value Ref Range Status   • WBC 12/02/2022 4 13 (L)  4 31 - 10 16 Thousand/uL Final   • RBC 12/02/2022 3 96  3 81 - 5 12 Million/uL Final   • Hemoglobin 12/02/2022 12 4  11 5 - 15 4 g/dL Final   • Hematocrit 12/02/2022 37 7  34 8 - 46 1 % Final   • MCV 12/02/2022 95  82 - 98 fL Final   • MCH 12/02/2022 31 3  26 8 - 34 3 pg Final   • MCHC 12/02/2022 32 9  31 4 - 37 4 g/dL Final   • RDW 12/02/2022 13 9  11 6 - 15 1 % Final   • MPV 12/02/2022 12 5  8 9 - 12 7 fL Final   • Platelets 16/40/6510 177  149 - 390 Thousands/uL Final   • nRBC 12/02/2022 0  /100 WBCs Final   • Neutrophils Relative 12/02/2022 58  43 - 75 % Final   • Immat GRANS % 12/02/2022 0  0 - 2 % Final   • Lymphocytes Relative 12/02/2022 32  14 - 44 % Final   • Monocytes Relative 12/02/2022 8  4 - 12 % Final   • Eosinophils Relative 12/02/2022 2  0 - 6 % Final   • Basophils Relative 12/02/2022 0  0 - 1 % Final   • Neutrophils Absolute 12/02/2022 2 38  1 85 - 7 62 Thousands/µL Final   • Immature Grans Absolute 12/02/2022 0 01  0 00 - 0 20 Thousand/uL Final   • Lymphocytes Absolute 12/02/2022 1 33  0 60 - 4 47 Thousands/µL Final   • Monocytes Absolute 12/02/2022 0 33  0 17 - 1 22 Thousand/µL Final   • Eosinophils Absolute 12/02/2022 0 07  0 00 - 0 61 Thousand/µL Final   • Basophils Absolute 12/02/2022 0 01  0 00 - 0 10 Thousands/µL Final   • Sodium 12/02/2022 139  135 - 147 mmol/L Final   • Potassium 12/02/2022 3 8  3 5 - 5 3 mmol/L Final   • Chloride 12/02/2022 104  96 - 108 mmol/L Final   • CO2 12/02/2022 31  21 - 32 mmol/L Final   • ANION GAP 12/02/2022 4  4 - 13 mmol/L Final   • BUN 12/02/2022 27 (H)  5 - 25 mg/dL Final   • Creatinine 12/02/2022 1 08  0 60 - 1 30 mg/dL Final    Standardized to IDMS reference method   • Glucose, Fasting 12/02/2022 107 (H)  65 - 99 mg/dL Final    Specimen collection should occur prior to Sulfasalazine administration due to the potential for falsely depressed results  Specimen collection should occur prior to Sulfapyridine administration due to the potential for falsely elevated results  • Calcium 12/02/2022 9 9  8 3 - 10 1 mg/dL Final   • Corrected Calcium 12/02/2022 10 4 (H)  8 3 - 10 1 mg/dL Final   • AST 12/02/2022 18  5 - 45 U/L Final    Specimen collection should occur prior to Sulfasalazine administration due to the potential for falsely depressed results      • ALT 12/02/2022 18  12 - 78 U/L Final    Specimen collection should occur prior to Sulfasalazine and/or Sulfapyridine administration due to the potential for falsely depressed results  • Alkaline Phosphatase 12/02/2022 36 (L)  46 - 116 U/L Final   • Total Protein 12/02/2022 7 2  6 4 - 8 4 g/dL Final   • Albumin 12/02/2022 3 4 (L)  3 5 - 5 0 g/dL Final   • Total Bilirubin 12/02/2022 0 61  0 20 - 1 00 mg/dL Final    Use of this assay is not recommended for patients undergoing treatment with eltrombopag due to the potential for falsely elevated results  • eGFR 12/02/2022 43  ml/min/1 73sq m Final   • Hemoglobin A1C 12/02/2022 5 0  Normal 3 8-5 6%; PreDiabetic 5 7-6 4%; Diabetic >=6 5%; Glycemic control for adults with diabetes <7 0% % Final   • EAG 12/02/2022 97  mg/dl Final   • Cholesterol 12/02/2022 149  See Comment mg/dL Final    Cholesterol:         Pediatric <18 Years        Desirable          <170 mg/dL      Borderline High    170-199 mg/dL      High               >=200 mg/dL        Adult >=18 Years            Desirable         <200 mg/dL      Borderline High   200-239 mg/dL      High              >239 mg/dL     • Triglycerides 12/02/2022 163 (H)  See Comment mg/dL Final    Triglyceride:     0-9Y            <75mg/dL     10Y-17Y         <90 mg/dL       >=18Y     Normal          <150 mg/dL     Borderline High 150-199 mg/dL     High            200-499 mg/dL        Very High       >499 mg/dL    Specimen collection should occur prior to N-Acetylcysteine or Metamizole administration due to the potential for falsely depressed results  • HDL, Direct 12/02/2022 54  >=50 mg/dL Final    Specimen collection should occur prior to Metamizole administration due to the potential for falsley depressed results  • LDL Calculated 12/02/2022 62  0 - 100 mg/dL Final    LDL Cholesterol:     Optimal           <100 mg/dl     Near Optimal      100-129 mg/dl     Above Optimal       Borderline High 130-159 mg/dl       High            160-189 mg/dl       Very High       >189 mg/dl         This screening LDL is a calculated result     It does not have the accuracy of the Direct Measured LDL in the monitoring of patients with hyperlipidemia and/or statin therapy  Direct Measure LDL ([id number]) must be ordered separately in these patients  • Non-HDL-Chol (CHOL-HDL) 12/02/2022 95  mg/dl Final   • TSH 3RD GENERATON 12/06/2022 0 449 (L)  0 450 - 4 500 uIU/mL Final    The recommended reference ranges for TSH during pregnancy are as follows:   First trimester 0 1 to 2 5 uIU/mL   Second trimester  0 2 to 3 0 uIU/mL   Third trimester 0 3 to 3 0 uIU/m    Note: Normal ranges may not apply to patients who are transgender, non-binary, or whose legal sex, sex at birth, and gender identity differ  Adult TSH (3rd generation) reference range follows the recommended guidelines of the [employer], January, 2020  • Color, UA 12/06/2022 Light Yellow   Final   • Clarity, UA 12/06/2022 Clear   Final   • Specific Gravity, UA 12/06/2022 1 015  1 003 - 1 030 Final   • pH, UA 12/06/2022 5 5  4 5, 5 0, 5 5, 6 0, 6 5, 7 0, 7 5, 8 0 Final   • Leukocytes, UA 12/06/2022 Negative  Negative Final   • Nitrite, UA 12/06/2022 Negative  Negative Final   • Protein, UA 12/06/2022 Negative  Negative mg/dl Final   • Glucose, UA 12/06/2022 Negative  Negative mg/dl Final   • Ketones, UA 12/06/2022 Negative  Negative mg/dl Final   • Urobilinogen, UA 12/06/2022 <2 0  <2 0 mg/dl mg/dl Final   • Bilirubin, UA 12/06/2022 Negative  Negative Final   • Occult Blood, UA 12/06/2022 Negative  Negative Final   • Free T4 12/06/2022 1 35  0 76 - 1 46 ng/dL Final    Specimen collection should occur prior to Sulfasalazine administration due to the potential for falsely elevated results  Ramírez Rubalcava MD        "This note has been constructed using a voice recognition system  Therefore there may be syntax, spelling, and/or grammatical errors   Please call if you have any questions  "

## 2022-12-08 NOTE — ASSESSMENT & PLAN NOTE
Patient is currently taking levothyroxine 75 mcg daily last TSH level is 0 449 slightly low T4 is normal we will continue with current dose of 75 mcg for now follow-up with repeat labs at later date

## 2022-12-08 NOTE — ASSESSMENT & PLAN NOTE
Patient with age-related osteoporosis on Prolia injections patient at present time decided not to have any more injections  Explained to the patient the need but still she does not want it will monitor closely for now

## 2022-12-08 NOTE — ASSESSMENT & PLAN NOTE
Patient's blood pressure today is 136/80 currently she is taking Bystolic 5 mg HydroDIURIL 12 5 mg and amlodipine 5 mg daily we will continue with the same

## 2022-12-08 NOTE — ASSESSMENT & PLAN NOTE
Patient with GERD symptoms currently taking pantoprazole 40 mg every day    Recommend to watch the diet not to eat late and keep the head end up

## 2022-12-08 NOTE — ASSESSMENT & PLAN NOTE
Patient with osteoarthritis of the knees taking Tylenol as needed she had received steroid injections in the past   Pain at times is more uses walker to ambulate

## 2022-12-08 NOTE — ASSESSMENT & PLAN NOTE
Patient is currently taking simvastatin 10 mg at bedtime lab reveals her cholesterol at 149 triglycerides 163 HDL is 54 and LDL of 62 we will continue with the current dose

## 2022-12-19 ENCOUNTER — HOSPITAL ENCOUNTER (OUTPATIENT)
Dept: INFUSION CENTER | Facility: HOSPITAL | Age: 87
Discharge: HOME | End: 2022-12-19

## 2023-01-14 ENCOUNTER — APPOINTMENT (EMERGENCY)
Dept: RADIOLOGY | Facility: HOSPITAL | Age: 88
End: 2023-01-14

## 2023-01-14 ENCOUNTER — HOSPITAL ENCOUNTER (INPATIENT)
Facility: HOSPITAL | Age: 88
LOS: 6 days | Discharge: NURSING, REHAB OR LONG-TERM CARE | End: 2023-01-20
Attending: EMERGENCY MEDICINE | Admitting: FAMILY MEDICINE

## 2023-01-14 DIAGNOSIS — I10 PRIMARY HYPERTENSION: ICD-10-CM

## 2023-01-14 DIAGNOSIS — K21.9 GASTROESOPHAGEAL REFLUX DISEASE WITHOUT ESOPHAGITIS: ICD-10-CM

## 2023-01-14 DIAGNOSIS — M81.0 AGE-RELATED OSTEOPOROSIS WITHOUT CURRENT PATHOLOGICAL FRACTURE: ICD-10-CM

## 2023-01-14 DIAGNOSIS — R11.2 NAUSEA VOMITING AND DIARRHEA: ICD-10-CM

## 2023-01-14 DIAGNOSIS — R19.7 NAUSEA VOMITING AND DIARRHEA: ICD-10-CM

## 2023-01-14 DIAGNOSIS — K57.92 ACUTE DIVERTICULITIS: Primary | ICD-10-CM

## 2023-01-14 DIAGNOSIS — R10.9 ABDOMINAL PAIN: ICD-10-CM

## 2023-01-14 LAB
ALBUMIN SERPL BCP-MCNC: 3.7 G/DL (ref 3.5–5)
ALP SERPL-CCNC: 55 U/L (ref 46–116)
ALT SERPL W P-5'-P-CCNC: 19 U/L (ref 12–78)
ANION GAP SERPL CALCULATED.3IONS-SCNC: 11 MMOL/L (ref 4–13)
AST SERPL W P-5'-P-CCNC: 23 U/L (ref 5–45)
BASOPHILS # BLD AUTO: 0.05 THOUSANDS/ÂΜL (ref 0–0.1)
BASOPHILS NFR BLD AUTO: 1 % (ref 0–1)
BILIRUB SERPL-MCNC: 0.74 MG/DL (ref 0.2–1)
BUN SERPL-MCNC: 26 MG/DL (ref 5–25)
CALCIUM SERPL-MCNC: 9.7 MG/DL (ref 8.3–10.1)
CARDIAC TROPONIN I PNL SERPL HS: 4 NG/L
CHLORIDE SERPL-SCNC: 98 MMOL/L (ref 96–108)
CO2 SERPL-SCNC: 30 MMOL/L (ref 21–32)
CREAT SERPL-MCNC: 1.13 MG/DL (ref 0.6–1.3)
EOSINOPHIL # BLD AUTO: 0 THOUSAND/ÂΜL (ref 0–0.61)
EOSINOPHIL NFR BLD AUTO: 0 % (ref 0–6)
ERYTHROCYTE [DISTWIDTH] IN BLOOD BY AUTOMATED COUNT: 13.8 % (ref 11.6–15.1)
FLUAV RNA RESP QL NAA+PROBE: NEGATIVE
FLUBV RNA RESP QL NAA+PROBE: NEGATIVE
GFR SERPL CREATININE-BSD FRML MDRD: 41 ML/MIN/1.73SQ M
GLUCOSE SERPL-MCNC: 189 MG/DL (ref 65–140)
HCT VFR BLD AUTO: 39.3 % (ref 34.8–46.1)
HGB BLD-MCNC: 13.1 G/DL (ref 11.5–15.4)
IMM GRANULOCYTES # BLD AUTO: 0.11 THOUSAND/UL (ref 0–0.2)
IMM GRANULOCYTES NFR BLD AUTO: 1 % (ref 0–2)
LACTATE SERPL-SCNC: 2 MMOL/L (ref 0.5–2)
LIPASE SERPL-CCNC: 51 U/L (ref 73–393)
LYMPHOCYTES # BLD AUTO: 1.05 THOUSANDS/ÂΜL (ref 0.6–4.47)
LYMPHOCYTES NFR BLD AUTO: 10 % (ref 14–44)
MAGNESIUM SERPL-MCNC: 1.8 MG/DL (ref 1.6–2.6)
MCH RBC QN AUTO: 31.7 PG (ref 26.8–34.3)
MCHC RBC AUTO-ENTMCNC: 33.3 G/DL (ref 31.4–37.4)
MCV RBC AUTO: 95 FL (ref 82–98)
MONOCYTES # BLD AUTO: 0.46 THOUSAND/ÂΜL (ref 0.17–1.22)
MONOCYTES NFR BLD AUTO: 4 % (ref 4–12)
NEUTROPHILS # BLD AUTO: 8.8 THOUSANDS/ÂΜL (ref 1.85–7.62)
NEUTS SEG NFR BLD AUTO: 84 % (ref 43–75)
NRBC BLD AUTO-RTO: 0 /100 WBCS
PLATELET # BLD AUTO: 175 THOUSANDS/UL (ref 149–390)
PMV BLD AUTO: 12.1 FL (ref 8.9–12.7)
POTASSIUM SERPL-SCNC: 3.3 MMOL/L (ref 3.5–5.3)
PROT SERPL-MCNC: 7.8 G/DL (ref 6.4–8.4)
RBC # BLD AUTO: 4.13 MILLION/UL (ref 3.81–5.12)
RSV RNA RESP QL NAA+PROBE: NEGATIVE
SARS-COV-2 RNA RESP QL NAA+PROBE: NEGATIVE
SODIUM SERPL-SCNC: 139 MMOL/L (ref 135–147)
WBC # BLD AUTO: 10.47 THOUSAND/UL (ref 4.31–10.16)

## 2023-01-14 RX ORDER — ONDANSETRON 2 MG/ML
4 INJECTION INTRAMUSCULAR; INTRAVENOUS EVERY 6 HOURS PRN
Status: DISCONTINUED | OUTPATIENT
Start: 2023-01-14 | End: 2023-01-20 | Stop reason: HOSPADM

## 2023-01-14 RX ORDER — LEVOFLOXACIN 5 MG/ML
750 INJECTION, SOLUTION INTRAVENOUS ONCE
Status: DISCONTINUED | OUTPATIENT
Start: 2023-01-14 | End: 2023-01-14

## 2023-01-14 RX ORDER — PANTOPRAZOLE SODIUM 40 MG/10ML
40 INJECTION, POWDER, LYOPHILIZED, FOR SOLUTION INTRAVENOUS
Status: DISCONTINUED | OUTPATIENT
Start: 2023-01-15 | End: 2023-01-20 | Stop reason: HOSPADM

## 2023-01-14 RX ORDER — METRONIDAZOLE 500 MG/100ML
500 INJECTION, SOLUTION INTRAVENOUS EVERY 8 HOURS
Status: DISCONTINUED | OUTPATIENT
Start: 2023-01-14 | End: 2023-01-20 | Stop reason: HOSPADM

## 2023-01-14 RX ORDER — NEBIVOLOL 10 MG/1
5 TABLET ORAL EVERY MORNING
Status: DISCONTINUED | OUTPATIENT
Start: 2023-01-15 | End: 2023-01-20 | Stop reason: HOSPADM

## 2023-01-14 RX ORDER — AMLODIPINE BESYLATE 5 MG/1
5 TABLET ORAL DAILY
Status: DISCONTINUED | OUTPATIENT
Start: 2023-01-14 | End: 2023-01-14

## 2023-01-14 RX ORDER — PANTOPRAZOLE SODIUM 40 MG/1
40 TABLET, DELAYED RELEASE ORAL
Status: DISCONTINUED | OUTPATIENT
Start: 2023-01-14 | End: 2023-01-14

## 2023-01-14 RX ORDER — LEVOTHYROXINE SODIUM 0.07 MG/1
75 TABLET ORAL EVERY MORNING
Status: DISCONTINUED | OUTPATIENT
Start: 2023-01-14 | End: 2023-01-14

## 2023-01-14 RX ORDER — METOCLOPRAMIDE HYDROCHLORIDE 5 MG/ML
10 INJECTION INTRAMUSCULAR; INTRAVENOUS ONCE
Status: COMPLETED | OUTPATIENT
Start: 2023-01-14 | End: 2023-01-14

## 2023-01-14 RX ORDER — SODIUM CHLORIDE, SODIUM GLUCONATE, SODIUM ACETATE, POTASSIUM CHLORIDE, MAGNESIUM CHLORIDE, SODIUM PHOSPHATE, DIBASIC, AND POTASSIUM PHOSPHATE .53; .5; .37; .037; .03; .012; .00082 G/100ML; G/100ML; G/100ML; G/100ML; G/100ML; G/100ML; G/100ML
75 INJECTION, SOLUTION INTRAVENOUS CONTINUOUS
Status: DISCONTINUED | OUTPATIENT
Start: 2023-01-14 | End: 2023-01-14

## 2023-01-14 RX ORDER — NEBIVOLOL 10 MG/1
5 TABLET ORAL EVERY MORNING
Status: DISCONTINUED | OUTPATIENT
Start: 2023-01-14 | End: 2023-01-14

## 2023-01-14 RX ORDER — ONDANSETRON 2 MG/ML
4 INJECTION INTRAMUSCULAR; INTRAVENOUS ONCE
Status: COMPLETED | OUTPATIENT
Start: 2023-01-14 | End: 2023-01-14

## 2023-01-14 RX ORDER — SODIUM CHLORIDE, SODIUM GLUCONATE, SODIUM ACETATE, POTASSIUM CHLORIDE, MAGNESIUM CHLORIDE, SODIUM PHOSPHATE, DIBASIC, AND POTASSIUM PHOSPHATE .53; .5; .37; .037; .03; .012; .00082 G/100ML; G/100ML; G/100ML; G/100ML; G/100ML; G/100ML; G/100ML
75 INJECTION, SOLUTION INTRAVENOUS CONTINUOUS
Status: DISCONTINUED | OUTPATIENT
Start: 2023-01-14 | End: 2023-01-19

## 2023-01-14 RX ORDER — PROMETHAZINE HYDROCHLORIDE 25 MG/ML
12.5 INJECTION, SOLUTION INTRAMUSCULAR; INTRAVENOUS EVERY 6 HOURS PRN
Status: DISCONTINUED | OUTPATIENT
Start: 2023-01-14 | End: 2023-01-15

## 2023-01-14 RX ORDER — AMLODIPINE BESYLATE 5 MG/1
5 TABLET ORAL DAILY
Status: DISCONTINUED | OUTPATIENT
Start: 2023-01-15 | End: 2023-01-20 | Stop reason: HOSPADM

## 2023-01-14 RX ORDER — ENOXAPARIN SODIUM 100 MG/ML
30 INJECTION SUBCUTANEOUS DAILY
Status: DISCONTINUED | OUTPATIENT
Start: 2023-01-15 | End: 2023-01-20 | Stop reason: HOSPADM

## 2023-01-14 RX ORDER — DIPHENHYDRAMINE HYDROCHLORIDE 50 MG/ML
25 INJECTION INTRAMUSCULAR; INTRAVENOUS ONCE
Status: COMPLETED | OUTPATIENT
Start: 2023-01-14 | End: 2023-01-14

## 2023-01-14 RX ORDER — CEFTRIAXONE 1 G/50ML
1000 INJECTION, SOLUTION INTRAVENOUS EVERY 24 HOURS
Status: DISCONTINUED | OUTPATIENT
Start: 2023-01-14 | End: 2023-01-20 | Stop reason: HOSPADM

## 2023-01-14 RX ORDER — ACETAMINOPHEN 325 MG/1
650 TABLET ORAL EVERY 6 HOURS PRN
Status: DISCONTINUED | OUTPATIENT
Start: 2023-01-14 | End: 2023-01-20 | Stop reason: HOSPADM

## 2023-01-14 RX ORDER — DICYCLOMINE HYDROCHLORIDE 10 MG/1
20 CAPSULE ORAL ONCE
Status: DISCONTINUED | OUTPATIENT
Start: 2023-01-14 | End: 2023-01-14

## 2023-01-14 RX ORDER — PROMETHAZINE HYDROCHLORIDE 25 MG/ML
25 INJECTION, SOLUTION INTRAMUSCULAR; INTRAVENOUS ONCE
Status: COMPLETED | OUTPATIENT
Start: 2023-01-14 | End: 2023-01-14

## 2023-01-14 RX ORDER — LEVOTHYROXINE SODIUM 0.07 MG/1
75 TABLET ORAL
Status: DISCONTINUED | OUTPATIENT
Start: 2023-01-15 | End: 2023-01-20 | Stop reason: HOSPADM

## 2023-01-14 RX ORDER — MECLIZINE HYDROCHLORIDE 25 MG/1
25 TABLET ORAL EVERY 8 HOURS SCHEDULED
Status: DISCONTINUED | OUTPATIENT
Start: 2023-01-14 | End: 2023-01-15

## 2023-01-14 RX ORDER — FAMOTIDINE 10 MG/ML
40 INJECTION, SOLUTION INTRAVENOUS ONCE
Status: COMPLETED | OUTPATIENT
Start: 2023-01-14 | End: 2023-01-14

## 2023-01-14 RX ORDER — METRONIDAZOLE 500 MG/100ML
500 INJECTION, SOLUTION INTRAVENOUS ONCE
Status: COMPLETED | OUTPATIENT
Start: 2023-01-14 | End: 2023-01-14

## 2023-01-14 RX ORDER — POTASSIUM CHLORIDE 14.9 MG/ML
20 INJECTION INTRAVENOUS ONCE
Status: COMPLETED | OUTPATIENT
Start: 2023-01-14 | End: 2023-01-14

## 2023-01-14 RX ORDER — PRAVASTATIN SODIUM 20 MG
20 TABLET ORAL
Status: DISCONTINUED | OUTPATIENT
Start: 2023-01-15 | End: 2023-01-20 | Stop reason: HOSPADM

## 2023-01-14 RX ORDER — FAMOTIDINE 10 MG/ML
20 INJECTION, SOLUTION INTRAVENOUS
Status: DISCONTINUED | OUTPATIENT
Start: 2023-01-14 | End: 2023-01-20 | Stop reason: HOSPADM

## 2023-01-14 RX ADMIN — METRONIDAZOLE 500 MG: 500 INJECTION, SOLUTION INTRAVENOUS at 10:22

## 2023-01-14 RX ADMIN — SODIUM CHLORIDE 1000 ML: 0.9 INJECTION, SOLUTION INTRAVENOUS at 06:05

## 2023-01-14 RX ADMIN — MECLIZINE HYDROCHLORIDE 25 MG: 25 TABLET ORAL at 22:13

## 2023-01-14 RX ADMIN — SODIUM CHLORIDE, SODIUM GLUCONATE, SODIUM ACETATE, POTASSIUM CHLORIDE, MAGNESIUM CHLORIDE, SODIUM PHOSPHATE, DIBASIC, AND POTASSIUM PHOSPHATE 100 ML/HR: .53; .5; .37; .037; .03; .012; .00082 INJECTION, SOLUTION INTRAVENOUS at 16:29

## 2023-01-14 RX ADMIN — IOHEXOL 100 ML: 350 INJECTION, SOLUTION INTRAVENOUS at 07:24

## 2023-01-14 RX ADMIN — NEBIVOLOL 5 MG: 10 TABLET ORAL at 12:51

## 2023-01-14 RX ADMIN — SODIUM CHLORIDE, SODIUM GLUCONATE, SODIUM ACETATE, POTASSIUM CHLORIDE, MAGNESIUM CHLORIDE, SODIUM PHOSPHATE, DIBASIC, AND POTASSIUM PHOSPHATE 75 ML/HR: .53; .5; .37; .037; .03; .012; .00082 INJECTION, SOLUTION INTRAVENOUS at 13:30

## 2023-01-14 RX ADMIN — ONDANSETRON 4 MG: 2 INJECTION INTRAMUSCULAR; INTRAVENOUS at 06:06

## 2023-01-14 RX ADMIN — POTASSIUM CHLORIDE 20 MEQ: 14.9 INJECTION, SOLUTION INTRAVENOUS at 08:12

## 2023-01-14 RX ADMIN — PROMETHAZINE HYDROCHLORIDE 25 MG: 25 INJECTION INTRAMUSCULAR; INTRAVENOUS at 07:08

## 2023-01-14 RX ADMIN — FAMOTIDINE 20 MG: 10 INJECTION, SOLUTION INTRAVENOUS at 22:19

## 2023-01-14 RX ADMIN — METRONIDAZOLE 500 MG: 500 INJECTION, SOLUTION INTRAVENOUS at 18:53

## 2023-01-14 RX ADMIN — PANTOPRAZOLE SODIUM 40 MG: 40 TABLET, DELAYED RELEASE ORAL at 12:51

## 2023-01-14 RX ADMIN — MECLIZINE HYDROCHLORIDE 25 MG: 25 TABLET ORAL at 18:53

## 2023-01-14 RX ADMIN — ONDANSETRON 4 MG: 2 INJECTION INTRAMUSCULAR; INTRAVENOUS at 18:52

## 2023-01-14 RX ADMIN — CEFTRIAXONE 1000 MG: 1 INJECTION, SOLUTION INTRAVENOUS at 12:51

## 2023-01-14 RX ADMIN — FAMOTIDINE 40 MG: 10 INJECTION, SOLUTION INTRAVENOUS at 07:34

## 2023-01-14 RX ADMIN — DIPHENHYDRAMINE HYDROCHLORIDE 25 MG: 50 INJECTION, SOLUTION INTRAMUSCULAR; INTRAVENOUS at 10:26

## 2023-01-14 RX ADMIN — AMLODIPINE BESYLATE 5 MG: 5 TABLET ORAL at 12:51

## 2023-01-14 RX ADMIN — METOCLOPRAMIDE 10 MG: 5 INJECTION, SOLUTION INTRAMUSCULAR; INTRAVENOUS at 10:26

## 2023-01-14 NOTE — H&P
History and Physical - [facility] Internal Medicine    Patient Information: Arin Naidu 80 y o  female MRN: [id number]  Unit/Bed#: [address] [facility] Encounter: [id number]  Admitting Physician: Davin Vallejo MD  PCP: Erin Teran MD  Date of Admission:  01/14/23        Hospital Problem List:     Principal Problem:    Acute diverticulitis  Active Problems:    Nausea vomiting and diarrhea    Primary hypertension    Gastroesophageal reflux disease without esophagitis    Dyslipidemia    Acquired hypothyroidism    Osteoarthritis of multiple joints      Assessment/Plan:    Nausea vomiting and diarrhea  Assessment & Plan  Presented with intractable nausea vomiting and multiple episodes of loose bowel movements since yesterday evening  Patient reports history of severe lactose intolerance and previous bouts of intractable nausea vomiting requiring hospitalization  Afebrile, mild leukocytosis    CT abdomen without any evidence of obstruction or colitis  Suspect gastroenteritis versus precipitation secondary to acute diverticulitis   · N p o   · IV fluids  · IV PPI, Pepcid  · IV antiemetics, Zofran and Phenergan  · Stool studies if diarrhea  · Monitor abdominal exam  · Monitor symptoms    * Acute diverticulitis  Assessment & Plan  Presented with 1 day history of abdominal pain, nausea vomiting diarrhea  CT revealed evidence of acute diverticulitis involving sigmoid colon without any complication  Mild leukocytosis  Abdomen exam benign  · IV ceftriaxone, metronidazole  · Abdominal exam  · N p o , IV fluid  · Symptomatic treatment      Gastroesophageal reflux disease without esophagitis  Assessment & Plan  Continue PPI, changed to IV  IV Pepcid nightly    Primary hypertension  Assessment & Plan  Elevated on presentation  · Resume home meds, amlodipine, Bystolic  · Holding hydrochlorothiazide    Osteoarthritis of multiple joints  Assessment & Plan  PT/OT    Acquired hypothyroidism  Assessment & Plan  On Synthroid    Dyslipidemia  Assessment & Plan  On statin      Hypokalemia-replete and monitor    VTE Prophylaxis: Enoxaparin (Lovenox)  / sequential compression device   Code Status: Level 1 - Full Code    Anticipated Length of Stay:  Patient will be admitted on an Inpatient basis with an anticipated length of stay of  > 2 midnights  Justification for Hospital Stay: Diverticulitis, intractable nausea vomiting    Total Time for Visit, including Counseling / Coordination of Care: 45 minutes  Greater than 50% of this total time spent on direct patient counseling and coordination of care  Discussed with patient's daughter over the phone    Chief Complaint:     Vomiting (Pt arrives from home, reports n/v/d starting 12 hours ago, went to bed feeling sick and has not gotten better this morning  )    History of Present Illness:    Stuart Eaton is a 80 y o  female history of hypertension, dyslipidemia, GERD, hypothyroidism, severe lactose intolerance, prior history of intractable nausea vomiting who presents with 1 day history of nonbloody intractable nausea vomiting and watery diarrhea associated with abdominal discomfort  She reported that she was in usual state of health yesterday 9 PM but since then she has been having symptoms without any improvement  Patient denied any sick contacts, fever, chills, unusual food, any traveling, change in bowel habits  Patient denies any hematemesis, melena, hematochezia  Patient denies any changes in medication or antibiotic use but reports NSAIDs infrequently for osteomyelitis  On presentation to ED patient was afebrile blood pressure was elevated saturating adequately on room air  Labs revealed mild leukocytosis, hypokalemia  Lactic acid and troponin are within normal limits  CT abdomen with contrast revealed acute diverticulitis without any complication  Chest x-ray did not reveal any acute abnormality  SARS-CoV-2 influenza RSV PCR was negative    Patient received IV fluid, IV potassium, IV Zofran, IV Phenergan, IV Reglan, IV Pepcid, IV Benadryl and was subsequently admitted  Patient currently sleeping comfortably in bed easily aroused reports mild nausea  Reports improvement in abdominal discomfort  Patient reported that she had similar intractable nausea and vomiting about 7 years ago requiring hospitalization for multiple days  Review of Systems:    Review of Systems   Constitutional: Positive for appetite change and fatigue  Negative for fever  HENT: Negative for trouble swallowing  Respiratory: Negative for cough and shortness of breath  Cardiovascular: Negative for chest pain  Gastrointestinal: Positive for abdominal pain, diarrhea, nausea and vomiting  Negative for anal bleeding and blood in stool  Genitourinary: Negative for difficulty urinating and dysuria  Neurological: Positive for weakness (Generalized)  Negative for speech difficulty and headaches  Psychiatric/Behavioral: Negative for confusion  Past Medical and Surgical History:     Past Medical History:   Diagnosis Date   • Acid reflux    • Arthritis    • DDD (degenerative disc disease), lumbar    • Disease of thyroid gland     hypo   • DJD (degenerative joint disease)    • History of transfusion     many years ago-1940's (after fetal demise-very early pregnancy)   • Hyperlipidemia    • Hypertension    • Osteoporosis    • Restless leg        Past Surgical History:   Procedure Laterality Date   • APPENDECTOMY     • BREAST SURGERY Left     biopsy   • CATARACT EXTRACTION W/ INTRAOCULAR LENS IMPLANT Left 10/24/2016    Procedure: EXTRACTION EXTRACAPSULAR CATARACT PHACO INTRAOCULAR LENS (IOL);   Surgeon: Belkys Vaca MD;  Location: [facility] MAIN OR;  Service:    • DILATION AND CURETTAGE OF UTERUS      1970's   • DXA PROCEDURE (HISTORICAL)  08/18/2020   • HEMORRHOID SURGERY  2009   • HYSTERECTOMY  1950    with right oophorectomy   • ME XCAPSL CTRC RMVL INSJ IO LENS PROSTH W/O ECP Right 2016    Procedure: EXTRACTION EXTRACAPSULAR CATARACT PHACO INTRAOCULAR LENS (IOL); Surgeon: Kelvin Vallejo MD;  Location: [facility] MAIN OR;  Service: Ophthalmology   • SKIN BIOPSY      exc of the [id number]   • TOE SURGERY Right     great toe removal of bone, and between the toes cyst removal   • TONSILLECTOMY     • TUBAL LIGATION         Meds/Allergies:    PTA meds:   Prior to Admission Medications   Prescriptions Last Dose Informant Patient Reported? Taking? Omega-3 Fatty Acids (FISH OIL) 1200 MG CAPS Unknown  Yes No   Sig: Take by mouth 2 (two) times a day   amLODIPine (NORVASC) 5 mg tablet 2023  Yes Yes   Sig: Take 5 mg by mouth in the morning   hydrochlorothiazide (HYDRODIURIL) 12 5 mg tablet 2023  Yes Yes   Sig: Take 12 5 mg by mouth every morning   levothyroxine 75 mcg tablet 2023  Yes Yes   Sig: Take 75 mcg by mouth every morning Tues,th,fri, sat, sun   nebivolol (BYSTOLIC) 10 mg tablet 4145  No Yes   Sig: Take 0 5 tablets (5 mg total) by mouth every morning   pantoprazole (PROTONIX) 40 mg tablet 2023  No Yes   Sig: Take 1 tablet (40 mg total) by mouth every morning   potassium chloride (K-DUR) 10 mEq tablet 2023  Yes Yes   Sig: Take 10 mEq by mouth every morning   simvastatin (ZOCOR) 10 mg tablet 2023  Yes Yes   Sig: Take 10 mg by mouth daily at bedtime      Facility-Administered Medications: None       Allergies: Allergies   Allergen Reactions   • Allegra [Fexofenadine] Itching   • Sulfa Antibiotics GI Intolerance     N/V   • Aspirin Rash     Only to baby aspirin     History:     Marital Status:       Substance Use History:   Social History     Substance and Sexual Activity   Alcohol Use Never     Social History     Tobacco Use   Smoking Status Former   • Types: Cigarettes   • Quit date:    • Years since quittin 0   Smokeless Tobacco Never   Tobacco Comments    social     Social History     Substance and Sexual Activity   Drug Use Never Family History:    Family History   Problem Relation Age of Onset   • Heart disease Father 47        MI       Physical Exam:     Vitals:   Blood Pressure: (!) 174/77 (01/14/23 1142)  Pulse: 84 (01/14/23 1148)  Temperature: (!) 97 4 °F (36 3 °C) (01/14/23 1142)  Temp Source: Axillary (01/14/23 1142)  Respirations: 20 (01/14/23 1142)  SpO2: 95 % (01/14/23 1148)    Physical Exam  Constitutional:       General: She is sleeping  She is not in acute distress  Appearance: She is ill-appearing  Comments: Elderly, frail   HENT:      Head: Normocephalic and atraumatic  Mouth/Throat:      Mouth: Mucous membranes are dry  Cardiovascular:      Rate and Rhythm: Normal rate  Pulmonary:      Effort: Pulmonary effort is normal  No respiratory distress  Breath sounds: Normal breath sounds  No wheezing or rales  Abdominal:      General: Bowel sounds are normal  There is distension (Mild)  Palpations: Abdomen is soft  Tenderness: There is no abdominal tenderness  There is no guarding or rebound  Musculoskeletal:      Right lower leg: No edema  Left lower leg: No edema  Skin:     General: Skin is warm and dry  Findings: No rash  Neurological:      General: No focal deficit present  Mental Status: She is easily aroused  Mental status is at baseline  GCS: GCS eye subscore is 4  GCS verbal subscore is 5  GCS motor subscore is 6  Cranial Nerves: Cranial nerves 2-12 are intact  No facial asymmetry  Motor: No tremor  Lab Results: I have personally reviewed pertinent reports        Results from last 7 days   Lab Units 01/14/23  0606   WBC Thousand/uL 10 47*   HEMOGLOBIN g/dL 13 1   HEMATOCRIT % 39 3   PLATELETS Thousands/uL 175   NEUTROS PCT % 84*   LYMPHS PCT % 10*   MONOS PCT % 4   EOS PCT % 0     Results from last 7 days   Lab Units 01/14/23  0606   POTASSIUM mmol/L 3 3*   CHLORIDE mmol/L 98   CO2 mmol/L 30   BUN mg/dL 26*   CREATININE mg/dL 1 13 CALCIUM mg/dL 9 7   ALK PHOS U/L 55   ALT U/L 19   AST U/L 23           Imaging: I have personally reviewed pertinent reports  CT abdomen pelvis with contrast    Result Date: 1/14/2023  Narrative: CT ABDOMEN AND PELVIS WITH IV CONTRAST INDICATION:   Abdominal pain, acute, nonlocalized abdominal pain, n/v/d  COMPARISON:  None  TECHNIQUE:  CT examination of the abdomen and pelvis was performed  Axial, sagittal, and coronal 2D reformatted images were created from the source data and submitted for interpretation  Radiation dose length product (DLP) for this visit:  545 73 mGy-cm   This examination, like all CT scans performed in the [facility], was performed utilizing techniques to minimize radiation dose exposure, including the use of iterative  reconstruction and automated exposure control  IV Contrast:  100 mL of iohexol (OMNIPAQUE) Enteric Contrast:  Enteric contrast was not administered  FINDINGS: ABDOMEN LOWER CHEST:  Mild bibasilar subsegmental atelectasis  LIVER/BILIARY TREE:  Unremarkable  GALLBLADDER:  No calcified gallstones  No pericholecystic inflammatory change  SPLEEN:  Unremarkable  PANCREAS:  Unremarkable  ADRENAL GLANDS:  Unremarkable  KIDNEYS/URETERS:  Unremarkable  No hydronephrosis  STOMACH AND BOWEL:  Acute diverticulitis in the distal sigmoid colon surrounding inflammatory changes   No obstruction  APPENDIX:  Not seen, surgically absent by history  ABDOMINOPELVIC CAVITY:  No significant ascites  No pneumoperitoneum  No lymphadenopathy  VESSELS:  Unremarkable for patient's age  PELVIS REPRODUCTIVE ORGANS:  Calcified fibroid  URINARY BLADDER:  Unremarkable  ABDOMINAL WALL/INGUINAL REGIONS:  Unremarkable  OSSEOUS STRUCTURES:  No acute fracture or destructive osseous lesion  Lumbar dextroscoliosis  Spinal degenerative changes  Degenerative grade 1 anterolisthesis at L4-5  Impression: Acute diverticulitis  No free air or abscess   Workstation performed: [id number]       CT abdomen pelvis with contrast   Final Result      Acute diverticulitis  No free air or abscess  Workstation performed: [id number]         XR chest 1 view portable    (Results Pending)       EKG, Pathology, and Other Studies Reviewed on Admission:   EKG- sinus rhythm with sinus arrhythmia at 69 beats a minute,     Allscripts/EPIC Records Reviewed: Yes     ** Please Note: "This note has been constructed using a voice recognition system  Therefore there may be syntax, spelling, and/or grammatical errors   Please call if you have any questions  "**

## 2023-01-14 NOTE — ED CARE HANDOFF
Emergency Department Sign Out Note        Sign out and transfer of care from Dr Elizabeth Galdamez  See Separate Emergency Department note  The patient, Alexandre Bolton, was evaluated by the previous provider for abd pain/vomiting/diarrhea  Workup Completed:  Labs    ED Course / Workup Pending (followup): Patient received in signout, pending CT report and reevaluation  Patient reevaluated  She is resting comfortably, however she has intermittent episodes of dry heaving  CT positive for acute diverticulitis  Will initiate course of IV antibiotics-Levaquin/Flagyl  Patient will be admitted to [facility]  Procedures  MDM        Disposition  Final diagnoses:   Abdominal pain   Nausea vomiting and diarrhea     Time reflects when diagnosis was documented in both MDM as applicable and the Disposition within this note     Time User Action Codes Description Comment    1/14/2023  7:51 AM Salvatore Toney Add [R10 9] Abdominal pain     1/14/2023  7:51 AM Salvatore Toney Add [R11 2,  R19 7] Nausea vomiting and diarrhea       ED Disposition     None      Follow-up Information    None       Patient's Medications   Discharge Prescriptions    No medications on file     No discharge procedures on file         ED Provider  Electronically Signed by     Jagruti Oneil DO  01/15/23 3160

## 2023-01-14 NOTE — ED NOTES
Patient transported to [address] [facility] [facility], [facility]  01/14/23 8686

## 2023-01-14 NOTE — ED PROVIDER NOTES
History  Chief Complaint   Patient presents with   • Vomiting     Pt arrives from home, reports n/v/d starting 12 hours ago, went to bed feeling sick and has not gotten better this morning  59-year-old female with past history of degenerative disc disease of lumbar spine, hypothyroidism, hypertension, hyperlipidemia, GERD, arthritis, presents to the ED for evaluation of acute onset diffuse abdominal pain, nonbloody nonbilious vomiting, nonbloody diarrhea since 9 PM last night  Patient had vomiting and diarrhea throughout the night  Subsequently patient called EMS and requested to be brought to the ED for further evaluation  Patient lives alone  Daughter was notified who came to meet patient at bedside  No other sick contacts noted  No fevers or chills noted  History provided by:  Patient  Vomiting  Associated symptoms: abdominal pain and diarrhea    Associated symptoms: no arthralgias, no chills, no cough, no fever and no headaches        Prior to Admission Medications   Prescriptions Last Dose Informant Patient Reported? Taking?    Omega-3 Fatty Acids (FISH OIL) 1200 MG CAPS Unknown  Yes No   Sig: Take by mouth 2 (two) times a day   amLODIPine (NORVASC) 5 mg tablet 1/13/2023  Yes Yes   Sig: Take 5 mg by mouth in the morning   hydrochlorothiazide (HYDRODIURIL) 12 5 mg tablet 1/13/2023  Yes Yes   Sig: Take 12 5 mg by mouth every morning   levothyroxine 75 mcg tablet 1/13/2023  Yes Yes   Sig: Take 75 mcg by mouth every morning Tues,thurs,fri, sat, sun   nebivolol (BYSTOLIC) 10 mg tablet 5/51/6516  No Yes   Sig: Take 0 5 tablets (5 mg total) by mouth every morning   pantoprazole (PROTONIX) 40 mg tablet 1/13/2023  No Yes   Sig: Take 1 tablet (40 mg total) by mouth every morning   potassium chloride (K-DUR) 10 mEq tablet 1/13/2023  Yes Yes   Sig: Take 10 mEq by mouth every morning   simvastatin (ZOCOR) 10 mg tablet 1/13/2023  Yes Yes   Sig: Take 10 mg by mouth daily at bedtime      Facility-Administered Medications: None       Past Medical History:   Diagnosis Date   • Acid reflux    • Arthritis    • DDD (degenerative disc disease), lumbar    • Disease of thyroid gland     hypo   • DJD (degenerative joint disease)    • History of transfusion     many years ago-1940's (after fetal demise-very early pregnancy)   • Hyperlipidemia    • Hypertension    • Osteoporosis    • Restless leg        Past Surgical History:   Procedure Laterality Date   • APPENDECTOMY     • BREAST SURGERY Left     biopsy   • CATARACT EXTRACTION W/ INTRAOCULAR LENS IMPLANT Left 10/24/2016    Procedure: EXTRACTION EXTRACAPSULAR CATARACT PHACO INTRAOCULAR LENS (IOL); Surgeon: Munira Cardozo MD;  Location: [facility] MAIN OR;  Service:    • DILATION AND CURETTAGE OF UTERUS         • DXA PROCEDURE (HISTORICAL)  2020   • HEMORRHOID SURGERY     • HYSTERECTOMY  1950    with right oophorectomy   • AL XCAPSL CTRC RMVL INSJ IO LENS PROSTH W/O ECP Right 2016    Procedure: EXTRACTION EXTRACAPSULAR CATARACT PHACO INTRAOCULAR LENS (IOL); Surgeon: Munira Cardozo MD;  Location: [facility] MAIN OR;  Service: Ophthalmology   • SKIN BIOPSY      exc of the [id number]   • TOE SURGERY Right     great toe removal of bone, and between the toes cyst removal   • TONSILLECTOMY     • TUBAL LIGATION         Family History   Problem Relation Age of Onset   • Heart disease Father 47        MI     I have reviewed and agree with the history as documented  E-Cigarette/Vaping   • E-Cigarette Use Never User      E-Cigarette/Vaping Substances     Social History     Tobacco Use   • Smoking status: Former     Types: Cigarettes     Quit date:      Years since quittin 0   • Smokeless tobacco: Never   • Tobacco comments:     social   Vaping Use   • Vaping Use: Never used   Substance Use Topics   • Alcohol use: Never   • Drug use: Never       Review of Systems   Constitutional: Negative for activity change, appetite change, chills and fever     HENT: Negative for congestion and ear pain  Eyes: Negative for pain and discharge  Respiratory: Negative for cough, chest tightness, shortness of breath, wheezing and stridor  Cardiovascular: Negative for chest pain and palpitations  Gastrointestinal: Positive for abdominal pain, diarrhea, nausea and vomiting  Negative for abdominal distention and constipation  Endocrine: Negative for cold intolerance  Genitourinary: Negative for dysuria, frequency and urgency  Musculoskeletal: Negative for arthralgias and back pain  Skin: Negative for color change and rash  Allergic/Immunologic: Negative for environmental allergies and food allergies  Neurological: Negative for dizziness, weakness, numbness and headaches  Hematological: Negative for adenopathy  Psychiatric/Behavioral: Negative for agitation, behavioral problems and confusion  The patient is not nervous/anxious  All other systems reviewed and are negative  Physical Exam  Physical Exam  Vitals and nursing note reviewed  Constitutional:       Appearance: She is well-developed  HENT:      Head: Normocephalic and atraumatic  Eyes:      Conjunctiva/sclera: Conjunctivae normal    Cardiovascular:      Rate and Rhythm: Normal rate and regular rhythm  Heart sounds: Normal heart sounds  Pulmonary:      Effort: Pulmonary effort is normal       Breath sounds: Normal breath sounds  Comments: Lungs are clear to auscultation bilaterally  Abdominal:      General: Bowel sounds are normal  There is no distension  Palpations: Abdomen is soft  Tenderness: There is no abdominal tenderness  Comments: Given is soft, nondistended, with bowel sound present to all 4 quadrants  Patient is actively vomiting in the ED  Generalized diffuse tenderness noted to palpation of abdomen  CVA tenderness noted bilaterally  Musculoskeletal:         General: Normal range of motion  Cervical back: Normal range of motion and neck supple  Skin:     General: Skin is warm and dry  Neurological:      Mental Status: She is alert and oriented to person, place, and time  Psychiatric:         Behavior: Behavior normal          Thought Content: Thought content normal          Judgment: Judgment normal          Vital Signs  ED Triage Vitals [01/14/23 0551]   Temperature Pulse Respirations Blood Pressure SpO2   97 6 °F (36 4 °C) 74 18 (!) 201/87 97 %      Temp Source Heart Rate Source Patient Position - Orthostatic VS BP Location FiO2 (%)   Oral Monitor Lying Right arm --      Pain Score       --           Vitals:    01/14/23 0551 01/14/23 0700   BP: (!) 201/87 160/68   Pulse: 74 68   Patient Position - Orthostatic VS: Lying          Visual Acuity      ED Medications  Medications   dicyclomine (BENTYL) capsule 20 mg (has no administration in time range)   potassium chloride 20 mEq IVPB (premix) (has no administration in time range)   sodium chloride 0 9 % bolus 1,000 mL (1,000 mL Intravenous New Bag 1/14/23 0605)   ondansetron (ZOFRAN) injection 4 mg (4 mg Intravenous Given 1/14/23 0606)   ondansetron (ZOFRAN) injection 4 mg (4 mg Intravenous Given 1/14/23 0606)   Famotidine (PF) (PEPCID) injection 40 mg (40 mg Intravenous Given 1/14/23 0734)   promethazine (PHENERGAN) injection 25 mg (25 mg Intravenous Given 1/14/23 0708)   iohexol (OMNIPAQUE) 350 MG/ML injection (SINGLE-DOSE) 100 mL (100 mL Intravenous Given 1/14/23 0724)       Diagnostic Studies  Results Reviewed     Procedure Component Value Units Date/Time    Lactic acid [[id number]]  (Normal) Collected: 01/14/23 0606    Lab Status: Final result Specimen: Blood from Arm, Right Updated: 01/14/23 0649     LACTIC ACID 2 0 mmol/L     Narrative:      Result may be elevated if tourniquet was used during collection  Blood culture #2 [[id number]] Collected: 01/14/23 0646    Lab Status:  In process Specimen: Blood from Arm, Right Updated: 01/14/23 0649    Comprehensive metabolic panel [[id number]]  (Abnormal) Collected: 01/14/23 0606    Lab Status: Final result Specimen: Blood from Arm, Right Updated: 01/14/23 0644     Sodium 139 mmol/L      Potassium 3 3 mmol/L      Chloride 98 mmol/L      CO2 30 mmol/L      ANION GAP 11 mmol/L      BUN 26 mg/dL      Creatinine 1 13 mg/dL      Glucose 189 mg/dL      Calcium 9 7 mg/dL      AST 23 U/L      ALT 19 U/L      Alkaline Phosphatase 55 U/L      Total Protein 7 8 g/dL      Albumin 3 7 g/dL      Total Bilirubin 0 74 mg/dL      eGFR 41 ml/min/1 73sq m     Narrative:      National Kid[employer] guidelines for Chronic Kidney Disease (CKD):   •  Stage 1 with normal or high GFR (GFR > 90 mL/min/1 73 square meters)  •  Stage 2 Mild CKD (GFR = 60-89 mL/min/1 73 square meters)  •  Stage 3A Moderate CKD (GFR = 45-59 mL/min/1 73 square meters)  •  Stage 3B Moderate CKD (GFR = 30-44 mL/min/1 73 square meters)  •  Stage 4 Severe CKD (GFR = 15-29 mL/min/1 73 square meters)  •  Stage 5 End Stage CKD (GFR <15 mL/min/1 73 square meters)  Note: GFR calculation is accurate only with a steady state creatinine    Magnesium [[id number]]  (Normal) Collected: 01/14/23 0606    Lab Status: Final result Specimen: Blood from Arm, Right Updated: 01/14/23 0644     Magnesium 1 8 mg/dL     Lipase [[id number]]  (Abnormal) Collected: 01/14/23 0606    Lab Status: Final result Specimen: Blood from Arm, Right Updated: 01/14/23 0644     Lipase 51 u/L     HS Troponin I 2hr [[id number]]     Lab Status: No result Specimen: Blood     HS Troponin 0hr (reflex protocol) [[id number]]  (Normal) Collected: 01/14/23 0606    Lab Status: Final result Specimen: Blood from Arm, Right Updated: 01/14/23 0637     hs TnI 0hr 4 ng/L     CBC and differential [[id number]]  (Abnormal) Collected: 01/14/23 0606    Lab Status: Final result Specimen: Blood from Arm, Right Updated: 01/14/23 0615     WBC 10 47 Thousand/uL      RBC 4 13 Million/uL      Hemoglobin 13 1 g/dL      Hematocrit 39 3 %      MCV 95 fL      MCH 31 7 pg      MCHC 33 3 g/dL      RDW 13 8 %      MPV 12 1 fL      Platelets 653 Thousands/uL      nRBC 0 /100 WBCs      Neutrophils Relative 84 %      Immat GRANS % 1 %      Lymphocytes Relative 10 %      Monocytes Relative 4 %      Eosinophils Relative 0 %      Basophils Relative 1 %      Neutrophils Absolute 8 80 Thousands/µL      Immature Grans Absolute 0 11 Thousand/uL      Lymphocytes Absolute 1 05 Thousands/µL      Monocytes Absolute 0 46 Thousand/µL      Eosinophils Absolute 0 00 Thousand/µL      Basophils Absolute 0 05 Thousands/µL     Blood culture #1 [[id number]] Collected: 01/14/23 0606    Lab Status: In process Specimen: Blood from Arm, Right Updated: 01/14/23 0611    UA w Reflex to Microscopic w Reflex to Culture [[id number]]     Lab Status: No result Specimen: Urine                  CT abdomen pelvis with contrast    (Results Pending)   XR chest 1 view portable    (Results Pending)              Procedures  ECG 12 Lead Documentation Only    Date/Time: 1/14/2023 5:49 AM  Performed by: Kayce Brunner DO  Authorized by: Kayce Brunner DO     Indications / Diagnosis:  Abdominal pain  ECG reviewed by me, the ED Provider: yes    Patient location:  ED  Previous ECG:     Previous ECG:  Unavailable    Comparison to cardiac monitor: Yes    Interpretation:     Interpretation: abnormal    Comments:      Sinus rhythm, rate 69, first-degree AV block, normal axis, no acute ST/2 evidence noted, sinus rhythm with sinus arrhythmia noted as well as first-degree AV block, minimal voltage criteria noted for LVH, no previous EKG available for comparison  ED Course                               SBIRT 20yo+    Flowsheet Row Most Recent Value   SBIRT (23 yo +)    In order to provide better care to our patients, we are screening all of our patients for alcohol and drug use  Would it be okay to ask you these screening questions?  No Filed at: 01/14/2023 3729                    Medical Decision Making  Obtain blood work, EKG, chest x-ray, CT abdomen/pelvis  IV fluids, antiemetics, pain medication and continue to monitor patient for any worsening symptoms    Patient's blood work shows mild leukocytosis as well as low potassium level  IV potassium repletion started in the ED  Patient required multiple doses of antiemetics for symptom control  Patient is currently awaiting CT abdomen/pelvis  Care patient signed out to the next attending will review CT study and then admit patient  Amount and/or Complexity of Data Reviewed  Labs: ordered  Radiology: ordered  Risk  Prescription drug management  Disposition  Final diagnoses:   Abdominal pain   Nausea vomiting and diarrhea     Time reflects when diagnosis was documented in both MDM as applicable and the Disposition within this note     Time User Action Codes Description Comment    1/14/2023  7:51 AM Jose F Toney Cha Add [R10 9] Abdominal pain     1/14/2023  7:51 AM Jose F Toney Cha Add [R11 2,  R19 7] Nausea vomiting and diarrhea       ED Disposition     None      Follow-up Information    None         Patient's Medications   Discharge Prescriptions    No medications on file       No discharge procedures on file      PDMP Review     None          ED Provider  Electronically Signed by           Inocencia Cloud DO  01/14/23 2059

## 2023-01-14 NOTE — PLAN OF CARE
Problem: Potential for Falls  Goal: Patient will remain free of falls  Description: INTERVENTIONS:  - Educate patient/family on patient safety including physical limitations  - Instruct patient to call for assistance with activity   - Consult OT/PT to assist with strengthening/mobility   - Keep Call bell within reach  - Keep bed low and locked with side rails adjusted as appropriate  - Keep care items and personal belongings within reach  - Initiate and maintain comfort rounds  - Make Fall Risk Sign visible to staff  - Offer Toileting every 2 Hours, in advance of need  - Initiate/Maintain bed alarm  - Obtain necessary fall risk management equipment: alarms  - Apply yellow socks and bracelet for high fall risk patients  - Consider moving patient to room near nurses station  Outcome: Progressing     Problem: MOBILITY - ADULT  Goal: Maintain or return to baseline ADL function  Description: INTERVENTIONS:  -  Assess patient's ability to carry out ADLs; assess patient's baseline for ADL function and identify physical deficits which impact ability to perform ADLs (bathing, care of mouth/teeth, toileting, grooming, dressing, etc )  - Assess/evaluate cause of self-care deficits   - Assess range of motion  - Assess patient's mobility; develop plan if impaired  - Assess patient's need for assistive devices and provide as appropriate  - Encourage maximum independence but intervene and supervise when necessary  - Involve family in performance of ADLs  - Assess for home care needs following discharge   - Consider OT consult to assist with ADL evaluation and planning for discharge  - Provide patient education as appropriate  Outcome: Progressing  Goal: Maintains/Returns to pre admission functional level  Description: INTERVENTIONS:  - Perform BMAT or MOVE assessment daily    - Set and communicate daily mobility goal to care team and patient/family/caregiver     - Collaborate with rehabilitation services on mobility goals if consulted  - Perform Range of Motion 4 times a day  - Reposition patient every 2 hours    - Dangle patient 2 times a day  - Stand patient 2 times a day  - Ambulate patient 3 times a day  - Out of bed to chair 2 times a day   - Out of bed for meals 2 times a day  - Out of bed for toileting  - Record patient progress and toleration of activity level   Outcome: Progressing     Problem: PAIN - ADULT  Goal: Verbalizes/displays adequate comfort level or baseline comfort level  Description: Interventions:  - Encourage patient to monitor pain and request assistance  - Assess pain using appropriate pain scale  - Administer analgesics based on type and severity of pain and evaluate response  - Implement non-pharmacological measures as appropriate and evaluate response  - Consider cultural and social influences on pain and pain management  - Notify physician/advanced practitioner if interventions unsuccessful or patient reports new pain  Outcome: Progressing     Problem: INFECTION - ADULT  Goal: Absence or prevention of progression during hospitalization  Description: INTERVENTIONS:  - Assess and monitor for signs and symptoms of infection  - Monitor lab/diagnostic results  - Monitor all insertion sites, i e  indwelling lines, tubes, and drains  - Monitor endotracheal if appropriate and nasal secretions for changes in amount and color  - [place] appropriate cooling/warming therapies per order  - Administer medications as ordered  - Instruct and encourage patient and family to use good hand hygiene technique  - Identify and instruct in appropriate isolation precautions for identified infection/condition  Outcome: Progressing  Goal: Absence of fever/infection during neutropenic period  Description: INTERVENTIONS:  - Monitor WBC    Outcome: Progressing     Problem: SAFETY ADULT  Goal: Patient will remain free of falls  Description: INTERVENTIONS:  - Educate patient/family on patient safety including physical limitations  - Instruct patient to call for assistance with activity   - Consult OT/PT to assist with strengthening/mobility   - Keep Call bell within reach  - Keep bed low and locked with side rails adjusted as appropriate  - Keep care items and personal belongings within reach  - Initiate and maintain comfort rounds  - Make Fall Risk Sign visible to staff  - Offer Toileting every 2 Hours, in advance of need  - Initiate/Maintain bed alarm  - Obtain necessary fall risk management equipment: alarms  - Apply yellow socks and bracelet for high fall risk patients  - Consider moving patient to room near nurses station  Outcome: Progressing  Goal: Maintain or return to baseline ADL function  Description: INTERVENTIONS:  -  Assess patient's ability to carry out ADLs; assess patient's baseline for ADL function and identify physical deficits which impact ability to perform ADLs (bathing, care of mouth/teeth, toileting, grooming, dressing, etc )  - Assess/evaluate cause of self-care deficits   - Assess range of motion  - Assess patient's mobility; develop plan if impaired  - Assess patient's need for assistive devices and provide as appropriate  - Encourage maximum independence but intervene and supervise when necessary  - Involve family in performance of ADLs  - Assess for home care needs following discharge   - Consider OT consult to assist with ADL evaluation and planning for discharge  - Provide patient education as appropriate  Outcome: Progressing  Goal: Maintains/Returns to pre admission functional level  Description: INTERVENTIONS:  - Perform BMAT or MOVE assessment daily    - Set and communicate daily mobility goal to care team and patient/family/caregiver  - Collaborate with rehabilitation services on mobility goals if consulted  - Perform Range of Motion 4 times a day  - Reposition patient every 2 hours    - Dangle patient 2 times a day  - Stand patient 2 times a day  - Ambulate patient 3 times a day  - Out of bed to chair 2 times a day   - Out of bed for meals 2 times a day  - Out of bed for toileting  - Record patient progress and toleration of activity level   Outcome: Progressing     Problem: DISCHARGE PLANNING  Goal: Discharge to home or other facility with appropriate resources  Description: INTERVENTIONS:  - Identify barriers to discharge w/patient and caregiver  - Arrange for needed discharge resources and transportation as appropriate  - Identify discharge learning needs (meds, wound care, etc )  - Arrange for interpretive services to assist at discharge as needed  - Refer to Case Management Department for coordinating discharge planning if the patient needs post-hospital services based on physician/advanced practitioner order or complex needs related to functional status, cognitive ability, or social support system  Outcome: Progressing     Problem: Knowledge Deficit  Goal: Patient/family/caregiver demonstrates understanding of disease process, treatment plan, medications, and discharge instructions  Description: Complete learning assessment and assess knowledge base    Interventions:  - Provide teaching at level of understanding  - Provide teaching via preferred learning methods  Outcome: Progressing     Problem: GASTROINTESTINAL - ADULT  Goal: Minimal or absence of nausea and/or vomiting  Description: INTERVENTIONS:  - Administer IV fluids if ordered to ensure adequate hydration  - Maintain NPO status until nausea and vomiting are resolved  - Nasogastric tube if ordered  - Administer ordered antiemetic medications as needed  - Provide nonpharmacologic comfort measures as appropriate  - Advance diet as tolerated, if ordered  - Consider nutrition services referral to assist patient with adequate nutrition and appropriate food choices  Outcome: Progressing

## 2023-01-15 PROBLEM — D64.9 ANEMIA: Status: ACTIVE | Noted: 2023-01-15

## 2023-01-15 LAB
ALBUMIN SERPL BCP-MCNC: 2.6 G/DL (ref 3.5–5)
ALP SERPL-CCNC: 40 U/L (ref 46–116)
ALT SERPL W P-5'-P-CCNC: 14 U/L (ref 12–78)
ANION GAP SERPL CALCULATED.3IONS-SCNC: 8 MMOL/L (ref 4–13)
AST SERPL W P-5'-P-CCNC: 18 U/L (ref 5–45)
BACTERIA UR QL AUTO: ABNORMAL /HPF
BILIRUB DIRECT SERPL-MCNC: 0.19 MG/DL (ref 0–0.2)
BILIRUB SERPL-MCNC: 0.7 MG/DL (ref 0.2–1)
BILIRUB UR QL STRIP: NEGATIVE
BUN SERPL-MCNC: 14 MG/DL (ref 5–25)
CALCIUM SERPL-MCNC: 8.7 MG/DL (ref 8.3–10.1)
CHLORIDE SERPL-SCNC: 102 MMOL/L (ref 96–108)
CLARITY UR: CLEAR
CO2 SERPL-SCNC: 32 MMOL/L (ref 21–32)
COLOR UR: YELLOW
CREAT SERPL-MCNC: 1.01 MG/DL (ref 0.6–1.3)
ERYTHROCYTE [DISTWIDTH] IN BLOOD BY AUTOMATED COUNT: 13.8 % (ref 11.6–15.1)
GFR SERPL CREATININE-BSD FRML MDRD: 47 ML/MIN/1.73SQ M
GLUCOSE SERPL-MCNC: 98 MG/DL (ref 65–140)
GLUCOSE UR STRIP-MCNC: NEGATIVE MG/DL
HCT VFR BLD AUTO: 32.1 % (ref 34.8–46.1)
HGB BLD-MCNC: 10.7 G/DL (ref 11.5–15.4)
HGB UR QL STRIP.AUTO: ABNORMAL
KETONES UR STRIP-MCNC: NEGATIVE MG/DL
LEUKOCYTE ESTERASE UR QL STRIP: NEGATIVE
MAGNESIUM SERPL-MCNC: 1.7 MG/DL (ref 1.6–2.6)
MCH RBC QN AUTO: 31.5 PG (ref 26.8–34.3)
MCHC RBC AUTO-ENTMCNC: 33.3 G/DL (ref 31.4–37.4)
MCV RBC AUTO: 94 FL (ref 82–98)
MUCOUS THREADS UR QL AUTO: ABNORMAL
NITRITE UR QL STRIP: NEGATIVE
NON-SQ EPI CELLS URNS QL MICRO: ABNORMAL /HPF
PH UR STRIP.AUTO: 6 [PH]
PHOSPHATE SERPL-MCNC: 3.2 MG/DL (ref 2.3–4.1)
PLATELET # BLD AUTO: 157 THOUSANDS/UL (ref 149–390)
PMV BLD AUTO: 11.9 FL (ref 8.9–12.7)
POTASSIUM SERPL-SCNC: 3.4 MMOL/L (ref 3.5–5.3)
PROT SERPL-MCNC: 5.8 G/DL (ref 6.4–8.4)
PROT UR STRIP-MCNC: ABNORMAL MG/DL
RBC # BLD AUTO: 3.4 MILLION/UL (ref 3.81–5.12)
RBC #/AREA URNS AUTO: ABNORMAL /HPF
SODIUM SERPL-SCNC: 142 MMOL/L (ref 135–147)
SP GR UR STRIP.AUTO: 1.01 (ref 1–1.03)
UROBILINOGEN UR QL STRIP.AUTO: 0.2 E.U./DL
WBC # BLD AUTO: 7.3 THOUSAND/UL (ref 4.31–10.16)
WBC #/AREA URNS AUTO: ABNORMAL /HPF

## 2023-01-15 RX ORDER — POTASSIUM CHLORIDE 14.9 MG/ML
20 INJECTION INTRAVENOUS ONCE
Status: COMPLETED | OUTPATIENT
Start: 2023-01-15 | End: 2023-01-15

## 2023-01-15 RX ORDER — METOCLOPRAMIDE HYDROCHLORIDE 5 MG/ML
5 INJECTION INTRAMUSCULAR; INTRAVENOUS EVERY 6 HOURS SCHEDULED
Status: DISCONTINUED | OUTPATIENT
Start: 2023-01-15 | End: 2023-01-18

## 2023-01-15 RX ORDER — MAGNESIUM SULFATE 1 G/100ML
1 INJECTION INTRAVENOUS ONCE
Status: COMPLETED | OUTPATIENT
Start: 2023-01-15 | End: 2023-01-15

## 2023-01-15 RX ORDER — MECLIZINE HYDROCHLORIDE 25 MG/1
25 TABLET ORAL EVERY 8 HOURS SCHEDULED
Status: DISCONTINUED | OUTPATIENT
Start: 2023-01-15 | End: 2023-01-20 | Stop reason: HOSPADM

## 2023-01-15 RX ORDER — MECLIZINE HYDROCHLORIDE 25 MG/1
25 TABLET ORAL EVERY 8 HOURS PRN
Status: DISCONTINUED | OUTPATIENT
Start: 2023-01-15 | End: 2023-01-15

## 2023-01-15 RX ADMIN — MECLIZINE HYDROCHLORIDE 25 MG: 25 TABLET ORAL at 21:56

## 2023-01-15 RX ADMIN — METRONIDAZOLE 500 MG: 500 INJECTION, SOLUTION INTRAVENOUS at 17:50

## 2023-01-15 RX ADMIN — POTASSIUM CHLORIDE 20 MEQ: 14.9 INJECTION, SOLUTION INTRAVENOUS at 08:57

## 2023-01-15 RX ADMIN — NEBIVOLOL 5 MG: 10 TABLET ORAL at 08:39

## 2023-01-15 RX ADMIN — AMLODIPINE BESYLATE 5 MG: 5 TABLET ORAL at 08:40

## 2023-01-15 RX ADMIN — METRONIDAZOLE 500 MG: 500 INJECTION, SOLUTION INTRAVENOUS at 11:07

## 2023-01-15 RX ADMIN — ONDANSETRON 4 MG: 2 INJECTION INTRAMUSCULAR; INTRAVENOUS at 11:07

## 2023-01-15 RX ADMIN — MAGNESIUM SULFATE HEPTAHYDRATE 1 G: 1 INJECTION, SOLUTION INTRAVENOUS at 14:29

## 2023-01-15 RX ADMIN — FAMOTIDINE 20 MG: 10 INJECTION, SOLUTION INTRAVENOUS at 21:56

## 2023-01-15 RX ADMIN — METRONIDAZOLE 500 MG: 500 INJECTION, SOLUTION INTRAVENOUS at 03:39

## 2023-01-15 RX ADMIN — MECLIZINE HYDROCHLORIDE 25 MG: 25 TABLET ORAL at 05:45

## 2023-01-15 RX ADMIN — SODIUM CHLORIDE, SODIUM GLUCONATE, SODIUM ACETATE, POTASSIUM CHLORIDE, MAGNESIUM CHLORIDE, SODIUM PHOSPHATE, DIBASIC, AND POTASSIUM PHOSPHATE 100 ML/HR: .53; .5; .37; .037; .03; .012; .00082 INJECTION, SOLUTION INTRAVENOUS at 16:19

## 2023-01-15 RX ADMIN — MECLIZINE HYDROCHLORIDE 25 MG: 25 TABLET ORAL at 14:29

## 2023-01-15 RX ADMIN — PANTOPRAZOLE SODIUM 40 MG: 40 INJECTION, POWDER, FOR SOLUTION INTRAVENOUS at 08:39

## 2023-01-15 RX ADMIN — ENOXAPARIN SODIUM 30 MG: 30 INJECTION SUBCUTANEOUS at 08:39

## 2023-01-15 RX ADMIN — LEVOTHYROXINE SODIUM 75 MCG: 75 TABLET ORAL at 05:45

## 2023-01-15 RX ADMIN — METOCLOPRAMIDE 5 MG: 5 INJECTION, SOLUTION INTRAMUSCULAR; INTRAVENOUS at 14:28

## 2023-01-15 RX ADMIN — METOCLOPRAMIDE 5 MG: 5 INJECTION, SOLUTION INTRAMUSCULAR; INTRAVENOUS at 17:49

## 2023-01-15 RX ADMIN — PRAVASTATIN SODIUM 20 MG: 20 TABLET ORAL at 16:19

## 2023-01-15 RX ADMIN — CEFTRIAXONE 1000 MG: 1 INJECTION, SOLUTION INTRAVENOUS at 12:13

## 2023-01-15 NOTE — ASSESSMENT & PLAN NOTE
Elevated on presentation, overall appears stable at present    · Resume home meds, amlodipine 5 mg, Bystolic decreased dose to 5 mg, HCTZ-reevaluate with PCP

## 2023-01-15 NOTE — PLAN OF CARE
Problem: Potential for Falls  Goal: Patient will remain free of falls  Description: INTERVENTIONS:  - Educate patient/family on patient safety including physical limitations  - Instruct patient to call for assistance with activity   - Consult OT/PT to assist with strengthening/mobility   - Keep Call bell within reach  - Keep bed low and locked with side rails adjusted as appropriate  - Keep care items and personal belongings within reach  - Initiate and maintain comfort rounds  - Make Fall Risk Sign visible to staff  - Offer Toileting every 2 Hours, in advance of need  - Initiate/Maintain bed alarm  - Obtain necessary fall risk management equipment:   - Apply yellow socks and bracelet for high fall risk patients  - Consider moving patient to room near nurses station  Outcome: Progressing     Problem: MOBILITY - ADULT  Goal: Maintain or return to baseline ADL function  Description: INTERVENTIONS:  - Educate patient/family on patient safety including physical limitations  - Instruct patient to call for assistance with activity   - Consult OT/PT to assist with strengthening/mobility   - Keep Call bell within reach  - Keep bed low and locked with side rails adjusted as appropriate  - Keep care items and personal belongings within reach  - Initiate and maintain comfort rounds  - Make Fall Risk Sign visible to staff  - Offer Toileting every 2 Hours, in advance of need  - Initiate/Maintain bed alarm  - Obtain necessary fall risk management equipment:   - Apply yellow socks and bracelet for high fall risk patients  - Consider moving patient to room near nurses station  Outcome: Progressing  Goal: Maintains/Returns to pre admission functional level  Description: INTERVENTIONS:  - Perform BMAT or MOVE assessment daily    - Set and communicate daily mobility goal to care team and patient/family/caregiver  - Collaborate with rehabilitation services on mobility goals if consulted  - Perform Range of Motion 2 times a day    - Reposition patient every 2 hours    - Dangle patient 4 times a day  - Stand patient 4 times a day  - Ambulate patient 4 times a day  - Out of bed to chair 3 times a day   - Out of bed for meals 3  Problem: PAIN - ADULT  Goal: Verbalizes/displays adequate comfort level or baseline comfort level  Description: Interventions:  - Encourage patient to monitor pain and request assistance  - Assess pain using appropriate pain scale  - Administer analgesics based on type and severity of pain and evaluate response  - Implement non-pharmacological measures as appropriate and evaluate response  - Consider cultural and social influences on pain and pain management  - Notify physician/advanced practitioner if interventions unsuccessful or patient reports new pain  Outcome: Progressing     Problem: INFECTION - ADULT  Goal: Absence or prevention of progression during hospitalization  Description: INTERVENTIONS:  - Assess and monitor for signs and symptoms of infection  - Monitor lab/diagnostic results  - Monitor all insertion sites, i e  indwelling lines, tubes, and drains  - Monitor endotracheal if appropriate and nasal secretions for changes in amount and color  - [place] appropriate cooling/warming therapies per order  - Administer medications as ordered  - Instruct and encourage patient and family to use good hand hygiene technique  - Identify and instruct in appropriate isolation precautions for identified infection/condition  Outcome: Progressing  Goal: Absence of fever/infection during neutropenic period  Description: INTERVENTIONS:  - Monitor WBC    Outcome: Progressing     Problem: SAFETY ADULT  Goal: Patient will remain free of falls  Description: INTERVENTIONS:  - Educate patient/family on patient safety including physical limitations  - Instruct patient to call for assistance with activity   - Consult OT/PT to assist with strengthening/mobility   - Keep Call bell within reach  - Keep bed low and locked with side rails adjusted as appropriate  - Keep care items and personal belongings within reach  - Initiate and maintain comfort rounds  - Make Fall Risk Sign visible to staff  - Offer Toileting every 2 Hours, in advance of need  - Initiate/Maintain bed alarm  - Obtain necessary fall risk management equipment:   - Apply yellow socks and bracelet for high fall risk patients  - Consider moving patient to room near nurses station  Outcome: Progressing  Goal: Maintain or return to baseline ADL function  Description: INTERVENTIONS:  - Educate patient/family on patient safety including physical limitations  - Instruct patient to call for assistance with activity   - Consult OT/PT to assist with strengthening/mobility   - Keep Call bell within reach  - Keep bed low and locked with side rails adjusted as appropriate  - Keep care items and personal belongings within reach  - Initiate and maintain comfort rounds  - Make Fall Risk Sign visible to staff  - Offer Toileting every 2 Hours, in advance of need  - Initiate/Maintain bed alarm  - Obtain necessary fall risk management equipment:   - Apply yellow socks and bracelet for high fall risk patients  - Consider moving patient to room near nurses station  Outcome: Progressing  Goal: Maintains/Returns to pre admission functional level  Description: INTERVENTIONS:  - Perform BMAT or MOVE assessment daily    - Set and communicate daily mobility goal to care team and patient/family/caregiver  - Collaborate with rehabilitation services on mobility goals if consulted  - Perform Range of Motion 4 times a day  - Reposition patient every 2 hours    - Dangle patient 4 times a day  - Stand patient 3 times a day  - Ambulate patient 3 times a day  - Out of bed to chair 3 times a day   - Out of bed for meals 3  Problem: DISCHARGE PLANNING  Goal: Discharge to home or other facility with appropriate resources  Description: INTERVENTIONS:  - Identify barriers to discharge w/patient and caregiver  - Arrange for needed discharge resources and transportation as appropriate  - Identify discharge learning needs (meds, wound care, etc )  - Arrange for interpretive services to assist at discharge as needed  - Refer to Case Management Department for coordinating discharge planning if the patient needs post-hospital services based on physician/advanced practitioner order or complex needs related to functional status, cognitive ability, or social support system  Outcome: Progressing     Problem: Knowledge Deficit  Goal: Patient/family/caregiver demonstrates understanding of disease process, treatment plan, medications, and discharge instructions  Description: Complete learning assessment and assess knowledge base    Interventions:  - Provide teaching at level of understanding  - Provide teaching via preferred learning methods  Outcome: Progressing     Problem: GASTROINTESTINAL - ADULT  Goal: Minimal or absence of nausea and/or vomiting  Description: INTERVENTIONS:  - Administer IV fluids if ordered to ensure adequate hydration  - Maintain NPO status until nausea and vomiting are resolved  - Nasogastric tube if ordered  - Administer ordered antiemetic medications as needed  - Provide nonpharmacologic comfort measures as appropriate  - Advance diet as tolerated, if ordered  - Consider nutrition services referral to assist patient with adequate nutrition and appropriate food choices  Outcome: Progressing     Problem: Prexisting or High Potential for Compromised Skin Integrity  Goal: Skin integrity is maintained or improved  Description: INTERVENTIONS:  - Identify patients at risk for skin breakdown  - Assess and monitor skin integrity  - Assess and monitor nutrition and hydration status  - Monitor labs   - Assess for incontinence   - Turn and reposition patient  - Assist with mobility/ambulation  - Relieve pressure over bony prominences  - Avoid friction and shearing  - Provide appropriate hygiene as needed including keeping skin clean and dry  - Evaluate need for skin moisturizer/barrier cream  - Collaborate with interdisciplinary team   - Patient/family teaching  - Consider wound care consult   Outcome: Progressing    times a day  - Out of bed for toileting  - Record patient progress and toleration of activity level   Outcome: Progressing    times a day  - Out of bed for toileting  - Record patient progress and toleration of activity level   Outcome: Progressing

## 2023-01-15 NOTE — ASSESSMENT & PLAN NOTE
Resolved,     Presented with intractable nausea vomiting and multiple episodes of loose bowel movements since yesterday evening  Patient reports history of severe lactose intolerance and previous bouts of intractable nausea vomiting requiring hospitalization  Afebrile, mild leukocytosis    CT abdomen without any evidence of obstruction or colitis  Suspect gastroenteritis versus precipitation secondary to acute diverticulitis   Slowly improving   · Continue around-the-clock Reglan,  · IV fluids  · IV PPI, Pepcid  · Clear liquid diet, will attempt to advance as tolerated  · IV antiemetics, standing Reglan with Zofran as needed  · Stool studies if diarrhea  · GI [id number] recommendations  · Monitor abdominal exam  · Monitor symptoms

## 2023-01-15 NOTE — ASSESSMENT & PLAN NOTE
Improving, Clinically,      Presented with 1 day history of abdominal pain, nausea vomiting diarrhea  CT revealed evidence of acute diverticulitis involving sigmoid colon without any complication  Mild leukocytosis on presentation, now improved  Abdomen exam remains benign but appears distended  · Continue IV ceftriaxone, metronidazole  · Abdominal exam  · Clear liquid diet as tolerated  · Symptomatic treatment  • GI recommendations:  • Pt started on clears  • IV fluids  • IV PPI, Pepcid  • IV antiemetics, Zofran ordered PRN, Reglan ordered every 6 hrs scheduled, transitioned to PRN   • Ordered KUB due to abdominal distention which showed slightly prominent loops of air-filled small bowel are seen throughout the abdomen, possibly representing a mild ileus  Patient was tolerating clear liquid diet and advanced to low residue lactose restricted

## 2023-01-15 NOTE — PROGRESS NOTES
[address] Internal Medicine Progress Note  Patient: Myrna Philip 80 y o  female   MRN: [id number]  PCP: Matty Gibson MD  Unit/Bed#: 2 [address] Encounter: [id number]  Date Of Visit: 01/15/23    Problem List:    Principal Problem:    Acute diverticulitis  Active Problems:    Nausea vomiting and diarrhea    Primary hypertension    Gastroesophageal reflux disease without esophagitis    Anemia    Dyslipidemia    Acquired hypothyroidism    Osteoarthritis of multiple joints      Assessment & Plan:    Nausea vomiting and diarrhea  Assessment & Plan  Presented with intractable nausea vomiting and multiple episodes of loose bowel movements since yesterday evening  Patient reports history of severe lactose intolerance and previous bouts of intractable nausea vomiting requiring hospitalization  Afebrile, mild leukocytosis  CT abdomen without any evidence of obstruction or colitis  Suspect gastroenteritis versus precipitation secondary to acute diverticulitis   Still nauseous despite Zofran  No further vomiting    Reports that Reglan helped previously in ED  · Will start around-the-clock Reglan  · IV fluids  · IV PPI, Pepcid  · Advance diet as tolerated, will start clears if improving  · IV antiemetics, standing Reglan with Zofran as needed  · Stool studies if diarrhea  · GI evaluation  · Monitor abdominal exam  · Monitor symptoms    * Acute diverticulitis  Assessment & Plan  Presented with 1 day history of abdominal pain, nausea vomiting diarrhea  CT revealed evidence of acute diverticulitis involving sigmoid colon without any complication  Mild leukocytosis on presentation, now improved  Abdomen exam benign  · Continue IV ceftriaxone, metronidazole  · Abdominal exam  · Will advance to clears if tolerated  · Symptomatic treatment  · GI evaluation      Anemia  Assessment & Plan  Likely dilutional,  Monitor    Gastroesophageal reflux disease without esophagitis  Assessment & Plan  Continue PPI, changed to IV  IV Pepcid nightly    Primary hypertension  Assessment & Plan  Elevated on presentation, overall appears stable at present  · Resume home meds, amlodipine, Bystolic with holding parameters  · Holding hydrochlorothiazide    Osteoarthritis of multiple joints  Assessment & Plan  PT/OT    Acquired hypothyroidism  Assessment & Plan  On Synthroid    Dyslipidemia  Assessment & Plan  On statin    Hypokalemia-mild, replete IV and monitor  IV magnesium for borderline hypomagnesemia      VTE Pharmacologic Prophylaxis: VTE Score: 5 Moderate Risk (Score 3-4) - Pharmacological DVT Prophylaxis Ordered: enoxaparin (Lovenox)  Patient Centered Rounds: I performed bedside rounds with nursing staff today  Discussions with Specialists or Other Care Team Provider: Yes    Education and Discussions with Family / Patient: Updated  (daughter) at bedside  Time Spent for Care: 45 minutes  More than 50% of total time spent on counseling and coordination of care as described above  Current Length of Stay: 1 day(s)  Current Patient Status: Inpatient   Certification Statement: The patient will continue to require additional inpatient hospital stay due to Diverticulitis, nausea vomiting  Discharge Plan: Anticipate discharge in 48-72 hrs to discharge location to be determined pending rehab evaluations  Code Status: Level 1 - Full Code    Subjective:   Reports stomach upset and intermittent nausea  No further vomiting  Mild abdominal discomfort  No bowel movements yet        Objective:     Vitals:   Temp (24hrs), Av 6 °F (36 4 °C), Min:97 °F (36 1 °C), Max:98 4 °F (36 9 °C)    Temp:  [97 °F (36 1 °C)-98 4 °F (36 9 °C)] 97 4 °F (36 3 °C)  HR:  [66-84] 67  Resp:  [17-22] 17  BP: (124-174)/(41-77) 141/53  SpO2:  [93 %-96 %] 96 % on room air  Body mass index is 26 86 kg/m²  Input and Output Summary (last 24 hours):      Intake/Output Summary (Last 24 hours) at 1/15/2023 5365  Last data filed at 1/15/2023 0356  Gross per 24 hour Intake 1270 ml   Output 600 ml   Net 670 ml       Physical Exam:   Physical Exam  Constitutional:       General: She is not in acute distress  HENT:      Head: Normocephalic and atraumatic  Mouth/Throat:      Mouth: Mucous membranes are dry  Cardiovascular:      Rate and Rhythm: Normal rate  Pulmonary:      Effort: Pulmonary effort is normal  No respiratory distress  Breath sounds: Normal breath sounds  No wheezing or rales  Abdominal:      General: There is distension (Mild)  Palpations: Abdomen is soft  Tenderness: There is abdominal tenderness (Mild lower)  There is no guarding or rebound  Musculoskeletal:      Right lower leg: No edema  Left lower leg: No edema  Skin:     General: Skin is warm and dry  Findings: No rash  Neurological:      Mental Status: She is alert           Additional Data:     Labs:  Results from last 7 days   Lab Units 01/15/23  0607 01/14/23  0606   WBC Thousand/uL 7 30 10 47*   HEMOGLOBIN g/dL 10 7* 13 1   HEMATOCRIT % 32 1* 39 3   PLATELETS Thousands/uL 157 175   NEUTROS PCT %  --  84*   LYMPHS PCT %  --  10*   MONOS PCT %  --  4   EOS PCT %  --  0     Results from last 7 days   Lab Units 01/15/23  0607   SODIUM mmol/L 142   POTASSIUM mmol/L 3 4*   CHLORIDE mmol/L 102   CO2 mmol/L 32   BUN mg/dL 14   CREATININE mg/dL 1 01   ANION GAP mmol/L 8   CALCIUM mg/dL 8 7   ALBUMIN g/dL 2 6*   TOTAL BILIRUBIN mg/dL 0 70   ALK PHOS U/L 40*   ALT U/L 14   AST U/L 18   GLUCOSE RANDOM mg/dL 98                 Results from last 7 days   Lab Units 01/14/23  0606   LACTIC ACID mmol/L 2 0       Lines/Drains:  Invasive Devices     Peripheral Intravenous Line  Duration           Peripheral IV 01/14/23 Right Antecubital 1 day          Drain  Duration           External Urinary Catheter <1 day                      Imaging: Reviewed radiology reports from this admission including: chest xray and abdominal/pelvic CT    Recent Cultures (last 7 days):   Results from last 7 days   Lab Units 01/14/23  0646 01/14/23  0606   BLOOD CULTURE  Received in Microbiology Lab  Culture in Progress  Received in Microbiology Lab  Culture in Progress  Last 24 Hours Medication List:   Current Facility-Administered Medications   Medication Dose Route Frequency Provider Last Rate   • acetaminophen  650 mg Oral Q6H PRN Brad Rivas MD     • amLODIPine  5 mg Oral Daily Brad Rivas MD     • cefTRIAXone  1,000 mg Intravenous Q24H Brad Rivas MD 1,000 mg (01/14/23 1251)   • enoxaparin  30 mg Subcutaneous Daily Brad Rivas MD     • famotidine  20 mg Intravenous HS Brad Rivas MD     • levothyroxine  75 mcg Oral Once per day on Sun Tue Thu Fri Sat Brad Rivas MD     • meclizine  25 mg Oral Q8H PRN Brad Rivas MD     • metroNIDAZOLE  500 mg Intravenous Q8H Brad Rivas  mg (01/15/23 0339)   • multi-electrolyte  100 mL/hr Intravenous Continuous Brad Rivas  mL/hr (01/15/23 0573)   • nebivolol  5 mg Oral QAM Brad Rivas MD     • ondansetron  4 mg Intravenous Q6H PRN Brad Rivas MD     • pantoprazole  40 mg Intravenous Q24H [address] Brad Rivsa MD     • pravastatin  20 mg Oral Daily With Duncan Cespedes MD     • promethazine  12 5 mg Intravenous Q6H PRN Brad Rivas MD          Today, Patient Was Seen By: Brad Rivas MD    ** Please Note: "This note has been constructed using a voice recognition system  Therefore there may be syntax, spelling, and/or grammatical errors   Please call if you have any questions  "**

## 2023-01-15 NOTE — PLAN OF CARE
Problem: Potential for Falls  Goal: Patient will remain free of falls  Description: INTERVENTIONS:  - Educate patient/family on patient safety including physical limitations  - Instruct patient to call for assistance with activity   - Consult OT/PT to assist with strengthening/mobility   - Keep Call bell within reach  - Keep bed low and locked with side rails adjusted as appropriate  - Keep care items and personal belongings within reach  - Initiate and maintain comfort rounds  - Make Fall Risk Sign visible to staff  - Offer Toileting every 2 Hours, in advance of need  - Initiate/Maintain bed alarm  - Obtain necessary fall risk management equipment:   - Apply yellow socks and bracelet for high fall risk patients  - Consider moving patient to room near nurses station  Outcome: Progressing     Problem: GASTROINTESTINAL - ADULT  Goal: Minimal or absence of nausea and/or vomiting  Description: INTERVENTIONS:  - Administer IV fluids if ordered to ensure adequate hydration  - Maintain NPO status until nausea and vomiting are resolved  - Nasogastric tube if ordered  - Administer ordered antiemetic medications as needed  - Provide nonpharmacologic comfort measures as appropriate  - Advance diet as tolerated, if ordered  - Consider nutrition services referral to assist patient with adequate nutrition and appropriate food choices  Outcome: Progressing

## 2023-01-16 ENCOUNTER — APPOINTMENT (INPATIENT)
Dept: RADIOLOGY | Facility: HOSPITAL | Age: 88
End: 2023-01-16

## 2023-01-16 LAB
ANION GAP SERPL CALCULATED.3IONS-SCNC: 10 MMOL/L (ref 4–13)
BUN SERPL-MCNC: 15 MG/DL (ref 5–25)
CALCIUM SERPL-MCNC: 9 MG/DL (ref 8.3–10.1)
CHLORIDE SERPL-SCNC: 98 MMOL/L (ref 96–108)
CO2 SERPL-SCNC: 28 MMOL/L (ref 21–32)
CREAT SERPL-MCNC: 0.69 MG/DL (ref 0.6–1.3)
ERYTHROCYTE [DISTWIDTH] IN BLOOD BY AUTOMATED COUNT: 13.8 % (ref 11.6–15.1)
FLUAV RNA RESP QL NAA+PROBE: NEGATIVE
FLUBV RNA RESP QL NAA+PROBE: NEGATIVE
GFR SERPL CREATININE-BSD FRML MDRD: 74 ML/MIN/1.73SQ M
GLUCOSE SERPL-MCNC: 125 MG/DL (ref 65–140)
HCT VFR BLD AUTO: 36.5 % (ref 34.8–46.1)
HGB BLD-MCNC: 12.3 G/DL (ref 11.5–15.4)
MAGNESIUM SERPL-MCNC: 1.8 MG/DL (ref 1.6–2.6)
MCH RBC QN AUTO: 31.3 PG (ref 26.8–34.3)
MCHC RBC AUTO-ENTMCNC: 33.7 G/DL (ref 31.4–37.4)
MCV RBC AUTO: 93 FL (ref 82–98)
PLATELET # BLD AUTO: 205 THOUSANDS/UL (ref 149–390)
PMV BLD AUTO: 11.6 FL (ref 8.9–12.7)
POTASSIUM SERPL-SCNC: 2.9 MMOL/L (ref 3.5–5.3)
RBC # BLD AUTO: 3.93 MILLION/UL (ref 3.81–5.12)
RSV RNA RESP QL NAA+PROBE: NEGATIVE
SARS-COV-2 RNA RESP QL NAA+PROBE: NEGATIVE
SODIUM SERPL-SCNC: 136 MMOL/L (ref 135–147)
WBC # BLD AUTO: 8.38 THOUSAND/UL (ref 4.31–10.16)

## 2023-01-16 RX ORDER — MAGNESIUM SULFATE HEPTAHYDRATE 40 MG/ML
2 INJECTION, SOLUTION INTRAVENOUS ONCE
Status: COMPLETED | OUTPATIENT
Start: 2023-01-16 | End: 2023-01-16

## 2023-01-16 RX ORDER — DIPHENHYDRAMINE HCL 25 MG
25 TABLET ORAL
Status: DISCONTINUED | OUTPATIENT
Start: 2023-01-16 | End: 2023-01-20 | Stop reason: HOSPADM

## 2023-01-16 RX ORDER — POTASSIUM CHLORIDE 29.8 MG/ML
40 INJECTION INTRAVENOUS ONCE
Status: DISCONTINUED | OUTPATIENT
Start: 2023-01-16 | End: 2023-01-16

## 2023-01-16 RX ORDER — POTASSIUM CHLORIDE 14.9 MG/ML
20 INJECTION INTRAVENOUS ONCE
Status: COMPLETED | OUTPATIENT
Start: 2023-01-16 | End: 2023-01-16

## 2023-01-16 RX ADMIN — METOCLOPRAMIDE 5 MG: 5 INJECTION, SOLUTION INTRAMUSCULAR; INTRAVENOUS at 13:57

## 2023-01-16 RX ADMIN — ONDANSETRON 4 MG: 2 INJECTION INTRAMUSCULAR; INTRAVENOUS at 08:44

## 2023-01-16 RX ADMIN — SODIUM CHLORIDE, SODIUM GLUCONATE, SODIUM ACETATE, POTASSIUM CHLORIDE, MAGNESIUM CHLORIDE, SODIUM PHOSPHATE, DIBASIC, AND POTASSIUM PHOSPHATE 75 ML/HR: .53; .5; .37; .037; .03; .012; .00082 INJECTION, SOLUTION INTRAVENOUS at 22:27

## 2023-01-16 RX ADMIN — AMLODIPINE BESYLATE 5 MG: 5 TABLET ORAL at 08:44

## 2023-01-16 RX ADMIN — METRONIDAZOLE 500 MG: 500 INJECTION, SOLUTION INTRAVENOUS at 02:35

## 2023-01-16 RX ADMIN — METOCLOPRAMIDE 5 MG: 5 INJECTION, SOLUTION INTRAMUSCULAR; INTRAVENOUS at 23:23

## 2023-01-16 RX ADMIN — MECLIZINE HYDROCHLORIDE 25 MG: 25 TABLET ORAL at 22:14

## 2023-01-16 RX ADMIN — PANTOPRAZOLE SODIUM 40 MG: 40 INJECTION, POWDER, FOR SOLUTION INTRAVENOUS at 08:44

## 2023-01-16 RX ADMIN — SODIUM CHLORIDE, SODIUM GLUCONATE, SODIUM ACETATE, POTASSIUM CHLORIDE, MAGNESIUM CHLORIDE, SODIUM PHOSPHATE, DIBASIC, AND POTASSIUM PHOSPHATE 75 ML/HR: .53; .5; .37; .037; .03; .012; .00082 INJECTION, SOLUTION INTRAVENOUS at 06:24

## 2023-01-16 RX ADMIN — ENOXAPARIN SODIUM 30 MG: 30 INJECTION SUBCUTANEOUS at 08:44

## 2023-01-16 RX ADMIN — METOCLOPRAMIDE 5 MG: 5 INJECTION, SOLUTION INTRAMUSCULAR; INTRAVENOUS at 17:58

## 2023-01-16 RX ADMIN — METOCLOPRAMIDE 5 MG: 5 INJECTION, SOLUTION INTRAMUSCULAR; INTRAVENOUS at 06:24

## 2023-01-16 RX ADMIN — MAGNESIUM SULFATE HEPTAHYDRATE 2 G: 40 INJECTION, SOLUTION INTRAVENOUS at 10:44

## 2023-01-16 RX ADMIN — NEBIVOLOL 5 MG: 10 TABLET ORAL at 08:44

## 2023-01-16 RX ADMIN — PRAVASTATIN SODIUM 20 MG: 20 TABLET ORAL at 17:58

## 2023-01-16 RX ADMIN — POTASSIUM CHLORIDE 20 MEQ: 14.9 INJECTION, SOLUTION INTRAVENOUS at 14:49

## 2023-01-16 RX ADMIN — POTASSIUM CHLORIDE 20 MEQ: 14.9 INJECTION, SOLUTION INTRAVENOUS at 10:44

## 2023-01-16 RX ADMIN — CEFTRIAXONE 1000 MG: 1 INJECTION, SOLUTION INTRAVENOUS at 13:57

## 2023-01-16 RX ADMIN — MECLIZINE HYDROCHLORIDE 25 MG: 25 TABLET ORAL at 06:24

## 2023-01-16 RX ADMIN — METRONIDAZOLE 500 MG: 500 INJECTION, SOLUTION INTRAVENOUS at 17:58

## 2023-01-16 RX ADMIN — FAMOTIDINE 20 MG: 10 INJECTION, SOLUTION INTRAVENOUS at 22:14

## 2023-01-16 RX ADMIN — METOCLOPRAMIDE 5 MG: 5 INJECTION, SOLUTION INTRAMUSCULAR; INTRAVENOUS at 00:28

## 2023-01-16 RX ADMIN — METRONIDAZOLE 500 MG: 500 INJECTION, SOLUTION INTRAVENOUS at 10:04

## 2023-01-16 RX ADMIN — MECLIZINE HYDROCHLORIDE 25 MG: 25 TABLET ORAL at 14:50

## 2023-01-16 NOTE — PROGRESS NOTES
[facility] Progress Note  Patient: Celina Burgos 80 y o  female   MRN: [id number]  PCP: Chery Calderon MD  Unit/Bed#: 2 [address] Encounter: [id number]  Date Of Visit: 01/16/23    Problem List:    Principal Problem:    Acute diverticulitis  Active Problems:    Nausea vomiting and diarrhea    Primary hypertension    Gastroesophageal reflux disease without esophagitis    Anemia    Dyslipidemia    Acquired hypothyroidism    Osteoarthritis of multiple joints      Assessment & Plan:    Nausea vomiting and diarrhea  Assessment & Plan  Presented with intractable nausea vomiting and multiple episodes of loose bowel movements since yesterday evening  Patient reports history of severe lactose intolerance and previous bouts of intractable nausea vomiting requiring hospitalization  Afebrile, mild leukocytosis    CT abdomen without any evidence of obstruction or colitis  Suspect gastroenteritis versus precipitation secondary to acute diverticulitis   Slowly improving   · Continue around-the-clock Reglan,  · IV fluids  · IV PPI, Pepcid  · Clear liquid diet, will attempt to advance as tolerated  · IV antiemetics, standing Reglan with Zofran as needed  · Stool studies if diarrhea  · GI [id number]-[id number] recommendations  · Monitor abdominal exam  · Monitor symptoms    * Acute diverticulitis  Assessment & Plan  Presented with 1 day history of abdominal pain, nausea vomiting diarrhea  CT revealed evidence of acute diverticulitis involving sigmoid colon without any complication  Mild leukocytosis on presentation, now improved  Abdomen exam remains benign but appears distended  · Continue IV ceftriaxone, metronidazole  · Abdominal exam  · Clear liquid diet  · Symptomatic treatment  · GI [id number] recommendations      Anemia  Assessment & Plan  Likely dilutional,  Monitor    Gastroesophageal reflux disease without esophagitis  Assessment & Plan  Continue PPI, changed to IV  IV Pepcid nightly    Primary hypertension  Assessment & Plan  Elevated on presentation, overall appears stable at present  · Resume home meds, amlodipine, Bystolic with holding parameters  · Holding hydrochlorothiazide    Osteoarthritis of multiple joints  Assessment & Plan  PT/OT    Acquired hypothyroidism  Assessment & Plan  On Synthroid    Dyslipidemia  Assessment & Plan  On statin    Hypokalemia- persistent in setting of decreased p o  intake  Replete potassium chloride IV  IV magnesium for borderline hypomagnesemia  Telemetry      VTE Pharmacologic Prophylaxis: VTE Score: 5 Moderate Risk (Score 3-4) - Pharmacological DVT Prophylaxis Ordered: enoxaparin (Lovenox)  Patient Centered Rounds: I performed bedside rounds with nursing staff today  Discussions with Specialists or Other Care Team Provider: Yes, GI    Education and Discussions with Family / Patient: Updated  (daughter) at bedside  Yesterday    Time Spent for Care: 45 minutes  More than 50% of total time spent on counseling and coordination of care as described above  Current Length of Stay: 2 day(s)  Current Patient Status: Inpatient   Certification Statement: The patient will continue to require additional inpatient hospital stay due to Diverticulitis, nausea vomiting  Discharge Plan: Anticipate discharge in 48-72 hrs to discharge location to be determined pending rehab evaluations  Code Status: Level 1 - Full Code    Subjective:       Feels slightly better with nausea, denies vomiting  Was able to tolerate small amount of clear liquid  Denies abdominal pain  Reports that she is not able to sleep at night          Objective:     Vitals:   Temp (24hrs), Av °F (36 7 °C), Min:97 4 °F (36 3 °C), Max:98 6 °F (37 °C)    Temp:  [97 4 °F (36 3 °C)-98 6 °F (37 °C)] 97 4 °F (36 3 °C)  HR:  [70-80] 72  Resp:  [18-20] 18  BP: (136-153)/(56-58) 153/56  SpO2:  [91 %-95 %] 94 % on room air  Body mass index is 26 86 kg/m²       Input and Output Summary (last 24 hours): Intake/Output Summary (Last 24 hours) at 1/16/2023 1119  Last data filed at 1/16/2023 1030  Gross per 24 hour   Intake 3245 83 ml   Output 850 ml   Net 2395 83 ml       Physical Exam:   Physical Exam  Constitutional:       General: She is not in acute distress  Comments: Elderly, pleasant   HENT:      Head: Normocephalic and atraumatic  Cardiovascular:      Rate and Rhythm: Normal rate  Pulmonary:      Effort: Pulmonary effort is normal  No respiratory distress  Breath sounds: Normal breath sounds  No wheezing or rales  Abdominal:      General: Bowel sounds are normal  There is distension  Palpations: Abdomen is soft  Tenderness: There is no abdominal tenderness  There is no guarding or rebound  Musculoskeletal:      Right lower leg: No edema  Left lower leg: No edema  Skin:     General: Skin is warm and dry  Findings: No rash  Neurological:      General: No focal deficit present  Mental Status: She is alert  Mental status is at baseline  Psychiatric:         Mood and Affect: Mood normal          Additional Data:     Labs:  Results from last 7 days   Lab Units 01/16/23  0902 01/15/23  0607 01/14/23  0606   WBC Thousand/uL 8 38   < > 10 47*   HEMOGLOBIN g/dL 12 3   < > 13 1   HEMATOCRIT % 36 5   < > 39 3   PLATELETS Thousands/uL 205   < > 175   NEUTROS PCT %  --   --  84*   LYMPHS PCT %  --   --  10*   MONOS PCT %  --   --  4   EOS PCT %  --   --  0    < > = values in this interval not displayed       Results from last 7 days   Lab Units 01/16/23  0902 01/15/23  0607   SODIUM mmol/L 136 142   POTASSIUM mmol/L 2 9* 3 4*   CHLORIDE mmol/L 98 102   CO2 mmol/L 28 32   BUN mg/dL 15 14   CREATININE mg/dL 0 69 1 01   ANION GAP mmol/L 10 8   CALCIUM mg/dL 9 0 8 7   ALBUMIN g/dL  --  2 6*   TOTAL BILIRUBIN mg/dL  --  0 70   ALK PHOS U/L  --  40*   ALT U/L  --  14   AST U/L  --  18   GLUCOSE RANDOM mg/dL 125 98                 Results from last 7 days   Lab Units 01/14/23  0606   LACTIC ACID mmol/L 2 0       Lines/Drains:  Invasive Devices     Peripheral Intravenous Line  Duration           Peripheral IV 01/14/23 Right Antecubital 2 days    Peripheral IV 01/15/23 Left Antecubital <1 day          Drain  Duration           External Urinary Catheter <1 day                      Imaging: Reviewed radiology reports from this admission including: chest xray and abdominal/pelvic CT    Recent Cultures (last 7 days):   Results from last 7 days   Lab Units 01/14/23  0646 01/14/23  0606   BLOOD CULTURE  No Growth at 48 hrs  No Growth at 48 hrs         Last 24 Hours Medication List:   Current Facility-Administered Medications   Medication Dose Route Frequency Provider Last Rate   • acetaminophen  650 mg Oral Q6H PRN Doug Albright MD     • amLODIPine  5 mg Oral Daily Doug Albright MD     • cefTRIAXone  1,000 mg Intravenous Q24H Doug Albright MD 1,000 mg (01/15/23 1213)   • enoxaparin  30 mg Subcutaneous Daily Doug Albright MD     • famotidine  20 mg Intravenous HS Doug Albright MD     • levothyroxine  75 mcg Oral Once per day on Sun Tue Thu Fri Sat Doug Albright MD     • magnesium sulfate  2 g Intravenous Once Doug Albright MD     • meclizine  25 mg Oral [facility] Doug Albright MD     • metoclopramide  5 mg Intravenous Q6H Marquis Duque MD     • metroNIDAZOLE  500 mg Intravenous Q8H Doug Albright  mg (01/16/23 1004)   • multi-electrolyte  75 mL/hr Intravenous Continuous Doug Albright MD 75 mL/hr (01/16/23 3157)   • nebivolol  5 mg Oral QAM Doug Albright MD     • ondansetron  4 mg Intravenous Q6H PRN Doug Albright MD     • pantoprazole  40 mg Intravenous Q24H [facility] Doug Albright MD     • potassium chloride  20 mEq Intravenous Once Doug Albright MD 20 mEq (01/16/23 1044)    Followed by   • potassium chloride  20 mEq Intravenous Once Doug Albright MD     • pravastatin  20 mg Oral Daily With Nicola Alcaraz MD          Today, Patient Was Seen By: Davin Vallejo MD    ** Please Note: "This note has been constructed using a voice recognition system  Therefore there may be syntax, spelling, and/or grammatical errors   Please call if you have any questions  "**

## 2023-01-16 NOTE — APP STUDENT NOTE
*Acute Diverticulitis   Patient presented to ED with nausea, vomiting, and diarrhea x1 day   CT Scan showed Acute Diverticulitis in the distal sigmoid colon surrounding inflammatory changes  No obstruction, free air, or abscess noted  - Continue patient on IV Rocephin and IV metronidazole   - Continue clear liquid diet   - Continue zofran PRN nausea   - Continue IV fluids   - Potassium level at 2 9; 1 dose potassium supplementation   - Monitor magnesium  - GI input appreciated       Nausea, Vomiting and Diarrhea   Patient presented with nausea, vomiting and diarrhea x1 day   History of acute diverticulitis; patient reports history of lactose intolerance  Afebrile with mild leukocytosis   CT as stated above   - IV Zofran PRN nausea   - IV metronidazole and IV rocephin  - IV metoclopramide   - IV PPI, Pepcid   - IV Fluids  - GI evaluation   - Stool studies if diarrhea  - Monitor abdominal pain and symptoms     Anemia   Monitor    Gastroesophageal Reflux Disease without esophagitis   Continue PPI, changed to IV   IV pepcid nightly     Acquired Hypothyroidism  Continue Synthroid       Primary Hypertension   Elevated upon admission, stabilizing now   Continue home meds; amlodipine and bystolic   Hold Hydrochlorothiazide    Dyslipidemia  Continue statin    Osteoarthritis of multiple joints   PT/OT       VTE Pharmacologic Prophylaxis: VTE Score: 5 High Risk (Score >/= 5) - Pharmacological DVT Prophylaxis Ordered: enoxaparin (Lovenox)  Sequential Compression Devices Ordered  Patient Centered Rounds: I performed bedside rounds with nursing staff today  Discussions with Specialists or Other Care Team Provider: ***    Education and Discussions with Family / Patient: Patient declined call to   Time Spent for Care: 30 minutes  More than 50% of total time spent on counseling and coordination of care as described above      Current Length of Stay: 2 day(s)  Current Patient Status: Inpatient   Certification Statement: The patient will continue to require additional inpatient hospital stay due to acute diverticulitis episode monitoring  Discharge Plan: Anticipate discharge in 48-72 hrs to home with home services  Code Status: Level 1 - Full Code    Subjective:   Patient reports no abdominal pain or episodes of vomiting  Patient states the IV medications are helping with her nausea  Patient states no episodes of constipation and is urinating well  Patient denies fever, chills, chest pain, shortness of breath, headache, or leg swelling  Objective:     Vitals:   Temp (24hrs), Av °F (36 7 °C), Min:97 4 °F (36 3 °C), Max:98 6 °F (37 °C)    Temp:  [97 4 °F (36 3 °C)-98 6 °F (37 °C)] 97 4 °F (36 3 °C)  HR:  [70-80] 72  Resp:  [18-20] 18  BP: (136-153)/(56-58) 153/56  SpO2:  [91 %-95 %] 94 %  Body mass index is 26 86 kg/m²  Input and Output Summary (last 24 hours): Intake/Output Summary (Last 24 hours) at 2023 0934  Last data filed at 2023 0585  Gross per 24 hour   Intake 3125 83 ml   Output 500 ml   Net 2625 83 ml       Physical Exam:   Physical Exam  Vitals and nursing note reviewed  Constitutional:       General: She is not in acute distress  Appearance: She is well-developed  HENT:      Head: Normocephalic and atraumatic  Eyes:      Conjunctiva/sclera: Conjunctivae normal    Cardiovascular:      Rate and Rhythm: Normal rate and regular rhythm  Heart sounds: No murmur heard  Pulmonary:      Effort: Pulmonary effort is normal  No respiratory distress  Breath sounds: Normal breath sounds  No wheezing, rhonchi or rales  Abdominal:      General: Bowel sounds are normal  There is no distension  Palpations: Abdomen is soft  Tenderness: There is no abdominal tenderness  There is no guarding or rebound  Musculoskeletal:         General: No swelling  Cervical back: Neck supple  Right lower leg: No swelling  Left lower leg: No swelling     Skin: General: Skin is warm and dry  Capillary Refill: Capillary refill takes less than 2 seconds  Neurological:      Mental Status: She is alert  Psychiatric:         Mood and Affect: Mood normal           Additional Data:     Labs:  Results from last 7 days   Lab Units 01/16/23  0902 01/15/23  0607 01/14/23  0606   WBC Thousand/uL 8 38   < > 10 47*   HEMOGLOBIN g/dL 12 3   < > 13 1   HEMATOCRIT % 36 5   < > 39 3   PLATELETS Thousands/uL 205   < > 175   NEUTROS PCT %  --   --  84*   LYMPHS PCT %  --   --  10*   MONOS PCT %  --   --  4   EOS PCT %  --   --  0    < > = values in this interval not displayed  Results from last 7 days   Lab Units 01/16/23  0902 01/15/23  0607   SODIUM mmol/L 136 142   POTASSIUM mmol/L 2 9* 3 4*   CHLORIDE mmol/L 98 102   CO2 mmol/L 28 32   BUN mg/dL 15 14   CREATININE mg/dL 0 69 1 01   ANION GAP mmol/L 10 8   CALCIUM mg/dL 9 0 8 7   ALBUMIN g/dL  --  2 6*   TOTAL BILIRUBIN mg/dL  --  0 70   ALK PHOS U/L  --  40*   ALT U/L  --  14   AST U/L  --  18   GLUCOSE RANDOM mg/dL 125 98                 Results from last 7 days   Lab Units 01/14/23  0606   LACTIC ACID mmol/L 2 0       Lines/Drains:  Invasive Devices     Peripheral Intravenous Line  Duration           Peripheral IV 01/14/23 Right Antecubital 2 days    Peripheral IV 01/15/23 Left Antecubital <1 day          Drain  Duration           External Urinary Catheter <1 day                      Imaging: No pertinent imaging reviewed  Recent Cultures (last 7 days):   Results from last 7 days   Lab Units 01/14/23  0646 01/14/23  0606   BLOOD CULTURE  No Growth at 24 hrs  No Growth at 24 hrs         Last 24 Hours Medication List:   Current Facility-Administered Medications   Medication Dose Route Frequency Provider Last Rate   • acetaminophen  650 mg Oral Q6H PRN Dariel Manzano MD     • amLODIPine  5 mg Oral Daily Dariel Manzano MD     • cefTRIAXone  1,000 mg Intravenous Q24H Dariel Manzano MD 1,000 mg (01/15/23 1213)   • enoxaparin  30 mg Subcutaneous Daily Brad Rivas MD     • famotidine  20 mg Intravenous HS Brad Rivas MD     • levothyroxine  75 mcg Oral Once per day on Sun Tue Thu Fri Sat Brad Rivas MD     • meclizine  25 mg Oral [facility] Brad Rivas MD     • metoclopramide  5 mg Intravenous Q6H Betsy Hooker MD     • metroNIDAZOLE  500 mg Intravenous Q8H Brad Rivas  mg (01/16/23 5864)   • multi-electrolyte  75 mL/hr Intravenous Continuous Brad Rivas MD 75 mL/hr (01/16/23 3499)   • nebivolol  5 mg Oral QAM Brad Rivas MD     • ondansetron  4 mg Intravenous Q6H PRN Brad Rivas MD     • pantoprazole  40 mg Intravenous Q24H Betsy Hooker MD     • pravastatin  20 mg Oral Daily With Duncan Cespedes MD          Today, Patient Was Seen By: Edd Arboleda    **Please Note: This note may have been constructed using a voice recognition system  **

## 2023-01-16 NOTE — PLAN OF CARE
Problem: Potential for Falls  Goal: Patient will remain free of falls  Description: INTERVENTIONS:  - Educate patient/family on patient safety including physical limitations  - Instruct patient to call for assistance with activity   - Consult OT/PT to assist with strengthening/mobility   - Keep Call bell within reach  - Keep bed low and locked with side rails adjusted as appropriate  - Keep care items and personal belongings within reach  - Initiate and maintain comfort rounds  - Make Fall Risk Sign visible to staff  - Offer Toileting every 2 Hours, in advance of need  - Initiate/Maintain bed alarm  - Obtain necessary fall risk management equipment:   - Apply yellow socks and bracelet for high fall risk patients  - Consider moving patient to room near nurses station  Outcome: Progressing     Problem: MOBILITY - ADULT  Goal: Maintain or return to baseline ADL function  Description: INTERVENTIONS:  - Educate patient/family on patient safety including physical limitations  - Instruct patient to call for assistance with activity   - Consult OT/PT to assist with strengthening/mobility   - Keep Call bell within reach  - Keep bed low and locked with side rails adjusted as appropriate  - Keep care items and personal belongings within reach  - Initiate and maintain comfort rounds  - Make Fall Risk Sign visible to staff  - Offer Toileting every 2 Hours, in advance of need  - Initiate/Maintain bed alarm  - Obtain necessary fall risk management equipment:   - Apply yellow socks and bracelet for high fall risk patients  - Consider moving patient to room near nurses station  Outcome: Progressing  Goal: Maintains/Returns to pre admission functional level  Description: INTERVENTIONS:  - Perform BMAT or MOVE assessment daily    - Set and communicate daily mobility goal to care team and patient/family/caregiver  - Collaborate with rehabilitation services on mobility goals if consulted  - Perform Range of Motion 4 times a day    - Reposition patient every 2 hours    - Dangle patient 2 times a day  - Stand patient 2 times a day  - Ambulate patient 3 times a day  - Out of bed to chair 2 times a day   - Out of bed for meals 2 times a day  - Out of bed for toileting  - Record patient progress and toleration of activity level   Outcome: Progressing     Problem: PAIN - ADULT  Goal: Verbalizes/displays adequate comfort level or baseline comfort level  Description: Interventions:  - Encourage patient to monitor pain and request assistance  - Assess pain using appropriate pain scale  - Administer analgesics based on type and severity of pain and evaluate response  - Implement non-pharmacological measures as appropriate and evaluate response  - Consider cultural and social influences on pain and pain management  - Notify physician/advanced practitioner if interventions unsuccessful or patient reports new pain  Outcome: Progressing     Problem: INFECTION - ADULT  Goal: Absence or prevention of progression during hospitalization  Description: INTERVENTIONS:  - Assess and monitor for signs and symptoms of infection  - Monitor lab/diagnostic results  - Monitor all insertion sites, i e  indwelling lines, tubes, and drains  - Monitor endotracheal if appropriate and nasal secretions for changes in amount and color  - Lehr appropriate cooling/warming therapies per order  - Administer medications as ordered  - Instruct and encourage patient and family to use good hand hygiene technique  - Identify and instruct in appropriate isolation precautions for identified infection/condition  Outcome: Progressing  Goal: Absence of fever/infection during neutropenic period  Description: INTERVENTIONS:  - Monitor WBC    Outcome: Progressing     Problem: SAFETY ADULT  Goal: Patient will remain free of falls  Description: INTERVENTIONS:  - Educate patient/family on patient safety including physical limitations  - Instruct patient to call for assistance with activity   - Consult OT/PT to assist with strengthening/mobility   - Keep Call bell within reach  - Keep bed low and locked with side rails adjusted as appropriate  - Keep care items and personal belongings within reach  - Initiate and maintain comfort rounds  - Make Fall Risk Sign visible to staff  - Offer Toileting every 2 Hours, in advance of need  - Initiate/Maintain bed alarm  - Obtain necessary fall risk management equipment:   - Apply yellow socks and bracelet for high fall risk patients  - Consider moving patient to room near nurses station  Outcome: Progressing  Goal: Maintain or return to baseline ADL function  Description: INTERVENTIONS:  - Educate patient/family on patient safety including physical limitations  - Instruct patient to call for assistance with activity   - Consult OT/PT to assist with strengthening/mobility   - Keep Call bell within reach  - Keep bed low and locked with side rails adjusted as appropriate  - Keep care items and personal belongings within reach  - Initiate and maintain comfort rounds  - Make Fall Risk Sign visible to staff  - Offer Toileting every 2 Hours, in advance of need  - Initiate/Maintain bed alarm  - Obtain necessary fall risk management equipment:   - Apply yellow socks and bracelet for high fall risk patients  - Consider moving patient to room near nurses station  Outcome: Progressing  Goal: Maintains/Returns to pre admission functional level  Description: INTERVENTIONS:  - Perform BMAT or MOVE assessment daily    - Set and communicate daily mobility goal to care team and patient/family/caregiver  - Collaborate with rehabilitation services on mobility goals if consulted  - Perform Range of Motion 4 times a day  - Reposition patient every 2 hours    - Dangle patient 2 times a day  - Stand patient 2 times a day  - Ambulate patient 3 times a day  - Out of bed to chair 2 times a day   - Out of bed for meals 2 times a day  - Out of bed for toileting  - Record patient progress and toleration of activity level   Outcome: Progressing     Problem: DISCHARGE PLANNING  Goal: Discharge to home or other facility with appropriate resources  Description: INTERVENTIONS:  - Identify barriers to discharge w/patient and caregiver  - Arrange for needed discharge resources and transportation as appropriate  - Identify discharge learning needs (meds, wound care, etc )  - Arrange for interpretive services to assist at discharge as needed  - Refer to Case Management Department for coordinating discharge planning if the patient needs post-hospital services based on physician/advanced practitioner order or complex needs related to functional status, cognitive ability, or social support system  Outcome: Progressing     Problem: Knowledge Deficit  Goal: Patient/family/caregiver demonstrates understanding of disease process, treatment plan, medications, and discharge instructions  Description: Complete learning assessment and assess knowledge base    Interventions:  - Provide teaching at level of understanding  - Provide teaching via preferred learning methods  Outcome: Progressing     Problem: GASTROINTESTINAL - ADULT  Goal: Minimal or absence of nausea and/or vomiting  Description: INTERVENTIONS:  - Administer IV fluids if ordered to ensure adequate hydration  - Maintain NPO status until nausea and vomiting are resolved  - Nasogastric tube if ordered  - Administer ordered antiemetic medications as needed  - Provide nonpharmacologic comfort measures as appropriate  - Advance diet as tolerated, if ordered  - Consider nutrition services referral to assist patient with adequate nutrition and appropriate food choices  Outcome: Progressing     Problem: Prexisting or High Potential for Compromised Skin Integrity  Goal: Skin integrity is maintained or improved  Description: INTERVENTIONS:  - Identify patients at risk for skin breakdown  - Assess and monitor skin integrity  - Assess and monitor nutrition and hydration status  - Monitor labs   - Assess for incontinence   - Turn and reposition patient  - Assist with mobility/ambulation  - Relieve pressure over bony prominences  - Avoid friction and shearing  - Provide appropriate hygiene as needed including keeping skin clean and dry  - Evaluate need for skin moisturizer/barrier cream  - Collaborate with interdisciplinary team   - Patient/family teaching  - Consider wound care consult   Outcome: Progressing     Problem: Nutrition/Hydration-ADULT  Goal: Nutrient/Hydration intake appropriate for improving, restoring or maintaining nutritional needs  Description: Monitor and assess patient's nutrition/hydration status for malnutrition  Collaborate with interdisciplinary team and initiate plan and interventions as ordered  Monitor patient's weight and dietary intake as ordered or per policy  Utilize nutrition screening tool and intervene as necessary  Determine patient's food preferences and provide high-protein, high-caloric foods as appropriate       INTERVENTIONS:  - Monitor oral intake, urinary output, labs, and treatment plans  - Assess nutrition and hydration status and recommend course of action  - Evaluate amount of meals eaten  - Assist patient with eating if necessary   - Allow adequate time for meals  - Recommend/ encourage appropriate diets, oral nutritional supplements, and vitamin/mineral supplements  - Order, calculate, and assess calorie counts as needed  - Recommend, monitor, and adjust tube feedings and TPN/PPN based on assessed needs  - Assess need for intravenous fluids  - Provide specific nutrition/hydration education as appropriate  - Include patient/family/caregiver in decisions related to nutrition  Outcome: Progressing

## 2023-01-16 NOTE — OCCUPATIONAL THERAPY NOTE
Occupational Therapy Evaluation/Treatment        01/16/23 1015   Note Type   Note type Evaluation   Pain Assessment   Pain Assessment Tool 0-10   Pain Score No Pain   Restrictions/Precautions   Other Precautions Chair Alarm; Bed Alarm; Fall Risk;Hard of hearing   Home Living   Type of [address] One level;Stairs to enter with rails  (5 SPIKE)   Bathroom Shower/Tub Tub/shower unit   Bathroom Toilet Standard   Bathroom Equipment Grab bars in shower; Shower chair   Home Equipment Walker;Cane   Prior Function   Level of [place] Independent with ADLs; Independent with functional mobility; Independent with IADLS   Lives With Alone   Receives Help From Family   IADLs Independent with driving; Independent with meal prep; Independent with medication management   Comments Patient reports PTA independent in all ADLs and mobility; uses cane for mobility during the day, RW at night; patient drives, gets own groceries, uses a laundry services; states son assists her with transport to doctor's appointments at times   General   Additional Pertinent History Patient presented to hospital with c/o vomiting and abdominal pain   ADL   Eating Assistance 7  Independent   Grooming Assistance 5  Supervision/Setup   UB Bathing Assistance 4  Minimal Assistance   LB Bathing Assistance 4  Minimal Assistance   [address] 4  [address]/ [address] 3  Moderate Assistance   [address]  1  Total Assistance   Toileting Deficit   (Incontinent of urine on eval)   Bed Mobility   Supine to Sit 4  Minimal assistance   Transfers   Sit to Stand 4  Minimal assistance   Additional items Assist x 1   Stand to Sit 4  Minimal assistance   Additional items Assist x 1   Additional Comments STS from EOB, RW for support   Functional Mobility   Functional Mobility 4  Minimal assistance   Additional Comments Ambulated few steps with RW; incontinent of urine upon standing   Balance   Static Sitting Fair + Dynamic Sitting Fair   Static Standing Fair   Dynamic Standing Fair   Activity Tolerance   Activity Tolerance Patient limited by fatigue   RUE Assessment   RUE Assessment WFL   LUE Assessment   LUE Assessment WFL   Cognition   Overall Cognitive Status WFL   Arousal/Participation Alert; Cooperative   Attention Within functional limits   Orientation Level Oriented X4   Following Commands Follows all commands and directions without difficulty   Assessment   Limitation Decreased ADL status; Decreased UE strength;Decreased Safe judgement during ADL;Decreased endurance;Decreased self-care trans;Decreased high-level ADLs   Prognosis Good   Assessment Patient evaluated by Occupational Therapy  Patient admitted with Acute diverticulitis  The patients occupational profile, medical and therapy history includes a extensive additional review of physical, cognitive, or psychosocial history related to current functional performance  Comorbidities affecting functional mobility and ADLS include: arthritis, DDD, HTN, HLD, acid reflux, DJD, osteoporosis  Prior to admission, patient was independent with functional mobility with cane or walker, independent with ADLS and independent with IADLS  The evaluation identifies the following performance deficits: weakness, impaired balance, decreased endurance, increased fall risk, new onset of impairment of functional mobility, decreased ADLS, decreased IADLS, decreased activity tolerance, decreased safety awareness and decreased strength, that result in activity limitations and/or participation restrictions  This evaluation requires clinical decision making of high complexity, because the patient presents with comorbidites that affect occupational performance and required significant modification of tasks or assistance with consideration of multiple treatment options    The Barthel Index was used as a functional outcome tool presenting with a score of Barthel Index Score: 50, indicating marked limitations of functional mobility and ADLS  The patient's raw score on the AM-PAC Daily Activity inpatient short form is 19, standardized score is 40 22, greater than 39 4  Patients at this level are likely to benefit from DC to home  Please refer to the recommendation of the Occupational Therapist for safe DC planning  Patient will benefit from skilled Occupational Therapy services to address above deficits and facilitate a safe return to prior level of function  Goals   Patient Goals To get home   STG Time Frame   (1-7 days)   Short Term Goal  Goals established to promote Patient Goals: To get home: Grooming: independent seated; Bathing: supervision; Upper Body Dressing supervision; Lower Body Dressing: min assist; Toileting: min assist; Patient will increase ambulatory standard toilet transfer to supervision with rolling walker to increase performance and safety with ADLS and functional mobility; Patient will increase standing tolerance to 5 minutes during ADL task to decrease assistance level and decrease fall risk; Patient will increase bed mobility to supervision in preparation for ADLS and transfers;  Patient will increase functional mobility to and from bathroom with rolling walker with supervision to increase performance with ADLS and to use a toilet; Patient will tolerate 5 minutes of UE ROM/strengthening to increase general activity tolerance and performance in ADLS/IADLS; Patient will improve functional activity tolerance to 5 minutes of sustained functional tasks to increase participation in basic self-care and decrease assistance level;  Patient will be able to to verbalize understanding and perform energy conservation/proper body mechanics during ADLS and functional mobility at least 75% of the time with minimal cueing to decrease signs of fatigue and increase stamina to return to prior level of function; Patient will increase dynamic sitting balance to fair+ to improve the ability to sit at edge of bed or on a chair for ADLS;  Patient will increase static/dynamic standing balance to fair+ to improve postural stability and decrease fall risk during standing ADLS and transfers  LTG Time Frame   (8-14 days)   Long Term Goal Grooming: independent standing at sink; Bathing: independent; Upper Body Dressing independent; Lower Body Dressing: supervision; Toileting: supervision; Patient will increase ambulatory standard toilet transfer to independent with rolling walker to increase performance and safety with ADLS and functional mobility; Patient will increase standing tolerance to 10 minutes during ADL task to decrease assistance level and decrease fall risk; Patient will increase bed mobility to independent in preparation for ADLS and transfers; Patient will increase functional mobility to and from bathroom with rolling walker independently to increase performance with ADLS and to use a toilet; Patient will tolerate 10 minutes of UE ROM/strengthening to increase general activity tolerance and performance in ADLS/IADLS; Patient will improve functional activity tolerance to 10 minutes of sustained functional tasks to increase participation in basic self-care and decrease assistance level;  Patient will be able to to verbalize understanding and perform energy conservation/proper body mechanics during ADLS and functional mobility at least 90% of the time with no cueing to decrease signs of fatigue and increase stamina to return to prior level of function; Patient will increase static/dynamic sitting balance to good to improve the ability to sit at edge of bed or on a chair for ADLS;  Patient will increase static/dynamic standing balance to good to improve postural stability and decrease fall risk during standing ADLS and transfers  Pt will score >/= 24/24 on AM-PAC Daily Activity Inpatient scale to promote safe independence with ADLs and functional mobility;  Pt will score >/= 95/100 on Barthel Index in order to decrease caregiver assistance needed and increase ability to perform ADLs and functional mobility  Plan   Treatment Interventions ADL retraining;Functional transfer training;UE strengthening/ROM; Endurance training;Patient/family training;Equipment evaluation/education; Compensatory technique education;Continued evaluation; Energy conservation; Activityengagement   Goal Expiration Date 01/30/23   OT Frequency 3-5x/wk   Recommendation   OT Discharge Recommendation Home with home health rehabilitation   AM-PAC Daily Activity Inpatient   Lower Body Dressing 2   Bathing 3   Toileting 3   Upper Body Dressing 3   Grooming 4   Eating 4   Daily Activity Raw Score 19   Daily Activity Standardized Score (Calc for Raw Score >=11) 40 22   AM-PAC Applied Cognition Inpatient   Following a Speech/Presentation 4   Understanding Ordinary Conversation 4   Taking Medications 4   Remembering Where Things Are Placed or Put Away 4   Remembering List of 4-5 Errands 4   Taking Care of Complicated Tasks 4   Applied Cognition Raw Score 24   Applied Cognition Standardized Score 62 21   Barthel Index   Feeding 10   Bathing 0   Grooming Score 0   Dressing Score 5   Bladder Score 5   Bowels Score 10   Toilet Use Score 5   Transfers (Bed/Chair) Score 10   Mobility (Level Surface) Score 0   Stairs Score 5   Barthel Index Score 50   Additional Treatment Session   Start Time 1000   End Time 1015   Treatment Assessment S: "I can't help peeing when I stand" O: Patient transferred from bed to [facility] with RW and min assist; toilet hygiene completed with supervision while seated ; sit to stand from [facility] with supervision, ambulated few feet to bed with RW and supervision;  While seated: lower body dressing: don underwear with pad with min assist; STS from EOB with supervision; patient then ambulated short household distance in room x 2 trials with RW and supervision; stand to sit to EOB with supervision, sit to supine with supervision; A: Patient limited by incontinence, reports wearing incontinence pads at home to avoid any issues; overall functional performance improved with increased activity;  At end of session patient supine in bed with all needs met; P: [employer] [address] License Number  Sharmin Newton, OTR/L [id number]

## 2023-01-16 NOTE — PLAN OF CARE
Problem: Potential for Falls  Goal: Patient will remain free of falls  Description: INTERVENTIONS:  - Educate patient/family on patient safety including physical limitations  - Instruct patient to call for assistance with activity   - Consult OT/PT to assist with strengthening/mobility   - Keep Call bell within reach  - Keep bed low and locked with side rails adjusted as appropriate  - Keep care items and personal belongings within reach  - Initiate and maintain comfort rounds  - Make Fall Risk Sign visible to staff  - Offer Toileting every 2 Hours, in advance of need  - Initiate/Maintain bed alarm  - Obtain necessary fall risk management equipment:   - Apply yellow socks and bracelet for high fall risk patients  - Consider moving patient to room near nurses station  Outcome: Progressing     Problem: MOBILITY - ADULT  Goal: Maintain or return to baseline ADL function  Description: INTERVENTIONS:  - Educate patient/family on patient safety including physical limitations  - Instruct patient to call for assistance with activity   - Consult OT/PT to assist with strengthening/mobility   - Keep Call bell within reach  - Keep bed low and locked with side rails adjusted as appropriate  - Keep care items and personal belongings within reach  - Initiate and maintain comfort rounds  - Make Fall Risk Sign visible to staff  - Offer Toileting every 2 Hours, in advance of need  - Initiate/Maintain bed alarm  - Obtain necessary fall risk management equipment:   - Apply yellow socks and bracelet for high fall risk patients  - Consider moving patient to room near nurses station  Outcome: Progressing  Goal: Maintains/Returns to pre admission functional level  Description: INTERVENTIONS:  - Perform BMAT or MOVE assessment daily    - Set and communicate daily mobility goal to care team and patient/family/caregiver  - Collaborate with rehabilitation services on mobility goals if consulted  - Perform Range of Motion 4times a day    - Reposition patient every 2 hours    - Dangle patient 4 times a day  - Stand patient 4 times a day  - Ambulate patient 4times a day  - Out of bed to chair 3 times a day   - Out of bed for meals 3 times a day  - Out of bed for toileting  - Record patient progress and toleration of activity level   Outcome: Progressing     Problem: PAIN - ADULT  Goal: Verbalizes/displays adequate comfort level or baseline comfort level  Description: Interventions:  - Encourage patient to monitor pain and request assistance  - Assess pain using appropriate pain scale  - Administer analgesics based on type and severity of pain and evaluate response  - Implement non-pharmacological measures as appropriate and evaluate response  - Consider cultural and social influences on pain and pain management  - Notify physician/advanced practitioner if interventions unsuccessful or patient reports new pain  Outcome: Progressing     Problem: INFECTION - ADULT  Goal: Absence or prevention of progression during hospitalization  Description: INTERVENTIONS:  - Assess and monitor for signs and symptoms of infection  - Monitor lab/diagnostic results  - Monitor all insertion sites, i e  indwelling lines, tubes, and drains  - Monitor endotracheal if appropriate and nasal secretions for changes in amount and color  - [place] appropriate cooling/warming therapies per order  - Administer medications as ordered  - Instruct and encourage patient and family to use good hand hygiene technique  - Identify and instruct in appropriate isolation precautions for identified infection/condition  Outcome: Progressing  Goal: Absence of fever/infection during neutropenic period  Description: INTERVENTIONS:  - Monitor WBC    Outcome: Progressing     Problem: SAFETY ADULT  Goal: Patient will remain free of falls  Description: INTERVENTIONS:  - Educate patient/family on patient safety including physical limitations  - Instruct patient to call for assistance with activity   - Consult OT/PT to assist with strengthening/mobility   - Keep Call bell within reach  - Keep bed low and locked with side rails adjusted as appropriate  - Keep care items and personal belongings within reach  - Initiate and maintain comfort rounds  - Make Fall Risk Sign visible to staff  - Offer Toileting every 2 Hours, in advance of need  - Initiate/Maintain bed alarm  - Obtain necessary fall risk management equipment:   - Apply yellow socks and bracelet for high fall risk patients  - Consider moving patient to room near nurses station  Outcome: Progressing  Goal: Maintain or return to baseline ADL function  Description: INTERVENTIONS:  - Educate patient/family on patient safety including physical limitations  - Instruct patient to call for assistance with activity   - Consult OT/PT to assist with strengthening/mobility   - Keep Call bell within reach  - Keep bed low and locked with side rails adjusted as appropriate  - Keep care items and personal belongings within reach  - Initiate and maintain comfort rounds  - Make Fall Risk Sign visible to staff  - Offer Toileting every 2 Hours, in advance of need  - Initiate/Maintain bed alarm  - Obtain necessary fall risk management equipment:   - Apply yellow socks and bracelet for high fall risk patients  - Consider moving patient to room near nurses station  Outcome: Progressing  Goal: Maintains/Returns to pre admission functional level  Description: INTERVENTIONS:  - Perform BMAT or MOVE assessment daily    - Set and communicate daily mobility goal to care team and patient/family/caregiver  - Collaborate with rehabilitation services on mobility goals if consulted  - Perform Range of Motion 4times a day  - Reposition patient every 2 hours    - Dangle patient 4 times a day  - Stand patient 4 times a day  - Ambulate patient 4 times a day  - Out of bed to chair 3times a day   - Out of bed for meals 3 times a day  - Out of bed for toileting  - Record patient progress and toleration of activity level   Outcome: Progressing     Problem: DISCHARGE PLANNING  Goal: Discharge to home or other facility with appropriate resources  Description: INTERVENTIONS:  - Identify barriers to discharge w/patient and caregiver  - Arrange for needed discharge resources and transportation as appropriate  - Identify discharge learning needs (meds, wound care, etc )  - Arrange for interpretive services to assist at discharge as needed  - Refer to Case Management Department for coordinating discharge planning if the patient needs post-hospital services based on physician/advanced practitioner order or complex needs related to functional status, cognitive ability, or social support system  Outcome: Progressing     Problem: Knowledge Deficit  Goal: Patient/family/caregiver demonstrates understanding of disease process, treatment plan, medications, and discharge instructions  Description: Complete learning assessment and assess knowledge base    Interventions:  - Provide teaching at level of understanding  - Provide teaching via preferred learning methods  Outcome: Progressing     Problem: GASTROINTESTINAL - ADULT  Goal: Minimal or absence of nausea and/or vomiting  Description: INTERVENTIONS:  - Administer IV fluids if ordered to ensure adequate hydration  - Maintain NPO status until nausea and vomiting are resolved  - Nasogastric tube if ordered  - Administer ordered antiemetic medications as needed  - Provide nonpharmacologic comfort measures as appropriate  - Advance diet as tolerated, if ordered  - Consider nutrition services referral to assist patient with adequate nutrition and appropriate food choices  Outcome: Progressing     Problem: Prexisting or High Potential for Compromised Skin Integrity  Goal: Skin integrity is maintained or improved  Description: INTERVENTIONS:  - Identify patients at risk for skin breakdown  - Assess and monitor skin integrity  - Assess and monitor nutrition and hydration status  - Monitor labs   - Assess for incontinence   - Turn and reposition patient  - Assist with mobility/ambulation  - Relieve pressure over bony prominences  - Avoid friction and shearing  - Provide appropriate hygiene as needed including keeping skin clean and dry  - Evaluate need for skin moisturizer/barrier cream  - Collaborate with interdisciplinary team   - Patient/family teaching  - Consider wound care consult   Outcome: Progressing     Problem: Nutrition/Hydration-ADULT  Goal: Nutrient/Hydration intake appropriate for improving, restoring or maintaining nutritional needs  Description: Monitor and assess patient's nutrition/hydration status for malnutrition  Collaborate with interdisciplinary team and initiate plan and interventions as ordered  Monitor patient's weight and dietary intake as ordered or per policy  Utilize nutrition screening tool and intervene as necessary  Determine patient's food preferences and provide high-protein, high-caloric foods as appropriate       INTERVENTIONS:  - Monitor oral intake, urinary output, labs, and treatment plans  - Assess nutrition and hydration status and recommend course of action  - Evaluate amount of meals eaten  - Assist patient with eating if necessary   - Allow adequate time for meals  - Recommend/ encourage appropriate diets, oral nutritional supplements, and vitamin/mineral supplements  - Order, calculate, and assess calorie counts as needed  - Recommend, monitor, and adjust tube feedings and TPN/PPN based on assessed needs  - Assess need for intravenous fluids  - Provide specific nutrition/hydration education as appropriate  - Include patient/family/caregiver in decisions related to nutrition  Outcome: Progressing

## 2023-01-16 NOTE — CONSULTS
Consultation -[facility]   Seun Scot 80 y o  female MRN: [id number]    Unit/Bed#: [address] [facility] Encounter: [id number]      Physician Requesting Consult: Dr Vicky Quintanilla     Reason for Consult / Principal Problem: diverticulitis, GERD      HPI:   This is a 80 y o  female history of hypertension, dyslipidemia, GERD, hypothyroidism, severe lactose intolerance, prior history of intractable nausea vomiting wh was admitted on 1/14 with 1 day history of nonbloody intractable nausea vomiting and watery diarrhea associated with abdominal discomfort  Patient denies any changes in medication or antibiotic use but reports NSAIDs infrequently for osteomyelitis      On presentation to ED ,labs revealed mild leukocytosis but was afebrile  CT abdomen with contrast revealed acute diverticulitis without any complication  Patient reported that she had similar intractable nausea and vomiting about 7 years ago requiring hospitalization for multiple days  Reports that her abdomen does feel bloated but "it always looks like this"  Patient denies any significant abdominal pain  Ports she has not moved her bowels since Saturday and she states that the nausea has improved since she has been taking Reglan regularly but she still does not have much of an appetite  Allergies:    Allergies   Allergen Reactions   • Allegra [Fexofenadine] Itching   • Sulfa Antibiotics GI Intolerance     N/V   • Aspirin Rash     Only to baby aspirin       Medications:  Current Facility-Administered Medications:   •  acetaminophen (TYLENOL) tablet 650 mg, 650 mg, Oral, Q6H PRN, Zulay Rubio MD  •  amLODIPine (NORVASC) tablet 5 mg, 5 mg, Oral, Daily, Zulay Rubio MD, 5 mg at 01/15/23 0840  •  cefTRIAXone (ROCEPHIN) IVPB (premix in dextrose) 1,000 mg 50 mL, 1,000 mg, Intravenous, Q24H, Zulay Rubio MD, Last Rate: 100 mL/hr at 01/15/23 1213, 1,000 mg at 01/15/23 1213  •  enoxaparin (LOVENOX) subcutaneous injection 30 mg, 30 mg, Subcutaneous, Daily, Michael Moy MD, 30 mg at 01/15/23 3593  •  Famotidine (PF) (PEPCID) injection 20 mg, 20 mg, Intravenous, HS, Michael Moy MD, 20 mg at 01/15/23 2156  •  levothyroxine tablet 75 mcg, 75 mcg, Oral, Once per day on Sun Tue Thu Fri Sat, Michael Moy MD, 75 mcg at 01/15/23 0545  •  meclizine (ANTIVERT) tablet 25 mg, 25 mg, Oral, Q8H [facility], Michael Moy MD, 25 mg at 01/16/23 0427  •  metoclopramide (REGLAN) injection 5 mg, 5 mg, Intravenous, Q6H [facility], Michael Moy MD, 5 mg at 01/16/23 8873  •  metroNIDAZOLE (FLAGYL) IVPB (premix) 500 mg 100 mL, 500 mg, Intravenous, Q8H, Michael Moy MD, Last Rate: 200 mL/hr at 01/16/23 0235, 500 mg at 01/16/23 0235  •  multi-electrolyte (PLASMALYTE-A/ISOLYTE-S PH 7 4) IV solution, 75 mL/hr, Intravenous, Continuous, Michael Moy MD, Last Rate: 75 mL/hr at 01/16/23 0624, 75 mL/hr at 01/16/23 9521  •  nebivolol (BYSTOLIC) tablet 5 mg, 5 mg, Oral, QAM, Michael Moy MD, 5 mg at 01/15/23 0839  •  ondansetron (ZOFRAN) injection 4 mg, 4 mg, Intravenous, Q6H PRN, Michael Moy MD, 4 mg at 01/15/23 1107  •  pantoprazole (PROTONIX) injection 40 mg, 40 mg, Intravenous, Q24H [facility], Michael Moy MD, 40 mg at 01/15/23 3482  •  pravastatin (PRAVACHOL) tablet 20 mg, 20 mg, Oral, Daily With Ousmane Allen MD, 20 mg at 01/15/23 1619    Past Medical history:  Past Medical History:   Diagnosis Date   • Acid reflux    • Arthritis    • DDD (degenerative disc disease), lumbar    • Disease of thyroid gland     hypo   • DJD (degenerative joint disease)    • History of transfusion     many years ago-1940's (after fetal demise-very early pregnancy)   • Hyperlipidemia    • Hypertension    • Osteoporosis    • Restless leg        Past Surgical History:   Past Surgical History:   Procedure Laterality Date   • APPENDECTOMY     • BREAST SURGERY Left     biopsy   • CATARACT EXTRACTION W/ INTRAOCULAR LENS IMPLANT Left 10/24/2016    Procedure: EXTRACTION EXTRACAPSULAR CATARACT PHACO INTRAOCULAR LENS (IOL); Surgeon: Miranda Reynaga MD;  Location: [facility] MAIN OR;  Service:    • DILATION AND CURETTAGE OF UTERUS      's   • DXA PROCEDURE (HISTORICAL)  2020   • HEMORRHOID SURGERY  2009   • HYSTERECTOMY  1950    with right oophorectomy   • ND XCAPSL CTRC RMVL INSJ IO LENS PROSTH W/O ECP Right 2016    Procedure: EXTRACTION EXTRACAPSULAR CATARACT PHACO INTRAOCULAR LENS (IOL); Surgeon: Miranda Reynaga MD;  Location: [facility] MAIN OR;  Service: Ophthalmology   • SKIN BIOPSY      exc of the [id number]   • TOE SURGERY Right     great toe removal of bone, and between the toes cyst removal   • TONSILLECTOMY     • TUBAL LIGATION         Family History:   Family History   Problem Relation Age of Onset   • Heart disease Father 47        MI       Social history:   Social History     Socioeconomic History   • Marital status:       Spouse name: Not on file   • Number of children: Not on file   • Years of education: Not on file   • Highest education level: Not on file   Occupational History   • Not on file   Tobacco Use   • Smoking status: Former     Types: Cigarettes     Quit date: 12     Years since quittin 0   • Smokeless tobacco: Never   • Tobacco comments:     social   Vaping Use   • Vaping Use: Never used   Substance and Sexual Activity   • Alcohol use: Never   • Drug use: Never   • Sexual activity: Not on file   Other Topics Concern   • Not on file   Social History Narrative   • Not on file     Social Determinants of Health     Financial Resource Strain: Not on file   Food Insecurity: Not on file   Transportation Needs: Not on file   Physical Activity: Not on file   Stress: Not on file   Social Connections: Not on file   Intimate Partner Violence: Not on file   Housing Stability: Not on file       Review of Systems: All other systems were reviewed and were negative, otherwise please refer to HPI    Physical Exam: /56 (BP Location: Right arm)   Pulse 72   Temp (!) 97 4 °F (36 3 °C) (Oral)   Resp 18   Ht 4' 11" (1 499 m)   Wt 60 3 kg (133 lb)   SpO2 94%   BMI 26 86 kg/m²     General Appearance:    Alert, cooperative, no distress, appears stated age   Head:    Normocephalic, without obvious abnormality, atraumatic   Eyes:    No scleral icterus           Mouth:  Mucosa moist   Neck:   Supple, symmetrical, trachea midline, no thyromegaly       Lungs:     Clear to auscultation bilaterally, respirations unlabored       Heart:    Regular rate and rhythm, S1 and S2 normal, no murmur, rub   or gallop     Abdomen:    Positive bowel sounds  Mildly distended, tympanic  Tender to palpation diffusely especially in left lower quadrant, no rebound rigidity guarding   Genitalia:   deferred   Rectal:   deferred   Extremities:   Extremities normal,no cyanosis or edema       Skin:   Skin color, texture, turgor normal, no rashes or lesions       Neurologic:   Grossly intact, no focal deficit           Lab Results: No results found for this or any previous visit (from the past 24 hour(s))  Imaging Studies: XR chest 1 view portable    Result Date: 1/14/2023  Narrative: CHEST INDICATION:   pain  COMPARISON:  CXR 11/9/2015, abdomen CT 01/14/2023  EXAM PERFORMED/VIEWS:  XR CHEST PORTABLE FINDINGS: Mild cardiomegaly  The lungs are clear  No pneumothorax or pleural effusion  Osseous structures appear within normal limits for patient age  Impression: No acute cardiopulmonary disease  Workstation performed: [id number]     CT abdomen pelvis with contrast    Result Date: 1/14/2023  Narrative: CT ABDOMEN AND PELVIS WITH IV CONTRAST INDICATION:   Abdominal pain, acute, nonlocalized abdominal pain, n/v/d  COMPARISON:  None  TECHNIQUE:  CT examination of the abdomen and pelvis was performed  Axial, sagittal, and coronal 2D reformatted images were created from the source data and submitted for interpretation   Radiation dose length product (DLP) for this visit:  545 73 mGy-cm   This examination, like all CT scans performed in the [facility], was performed utilizing techniques to minimize radiation dose exposure, including the use of iterative  reconstruction and automated exposure control  IV Contrast:  100 mL of iohexol (OMNIPAQUE) Enteric Contrast:  Enteric contrast was not administered  FINDINGS: ABDOMEN LOWER CHEST:  Mild bibasilar subsegmental atelectasis  LIVER/BILIARY TREE:  Unremarkable  GALLBLADDER:  No calcified gallstones  No pericholecystic inflammatory change  SPLEEN:  Unremarkable  PANCREAS:  Unremarkable  ADRENAL GLANDS:  Unremarkable  KIDNEYS/URETERS:  Unremarkable  No hydronephrosis  STOMACH AND BOWEL:  Acute diverticulitis in the distal sigmoid colon surrounding inflammatory changes   No obstruction  APPENDIX:  Not seen, surgically absent by history  ABDOMINOPELVIC CAVITY:  No significant ascites  No pneumoperitoneum  No lymphadenopathy  VESSELS:  Unremarkable for patient's age  PELVIS REPRODUCTIVE ORGANS:  Calcified fibroid  URINARY BLADDER:  Unremarkable  ABDOMINAL WALL/INGUINAL REGIONS:  Unremarkable  OSSEOUS STRUCTURES:  No acute fracture or destructive osseous lesion  Lumbar dextroscoliosis  Spinal degenerative changes  Degenerative grade 1 anterolisthesis at L4-5  Impression: Acute diverticulitis  No free air or abscess   Workstation performed: [id number]       Assessment/Plan:   Nausea vomiting and diarrhea  Presented with intractable nausea vomiting and multiple episodes of loose bowel movements   Patient reports history of severe lactose intolerance and previous bouts of intractable nausea vomiting requiring hospitalization  Afebrile, mild leukocytosis on admission which has resolved  CT abdomen without any evidence of obstruction or colitis but had shown acute diverticulitis  • Pt started on clears  • IV fluids  • IV PPI, Pepcid  • IV antiemetics, Zofran ordered PRN, Reglan ordered every 6 hrs scheduled  • Will order portable KUB today due to abdominal distention to evaluate for any evidence of ileus     * Acute diverticulitis    Presented with 1 day history of abdominal pain, nausea vomiting diarrhea  CT revealed evidence of acute diverticulitis involving sigmoid colon without any complication  • IV ceftriaxone, metronidazole  • Patient still with some vague abdominal tenderness and especially left lower quadrant tenderness when I examined her but did not have significant tenderness upon examination with hospitalist        Gastroesophageal reflux disease without esophagitis  -Continue PPI iV, IV Pepcid nightly, improving      Thank you for the consultation, case will be d/w Dr Xena Yost, spoke with hospitalist regarding case

## 2023-01-16 NOTE — PHYSICAL THERAPY NOTE
PHYSICAL THERAPY EVALUATION/TREATMENT     01/16/23 1445   Note Type   Note type Evaluation   Pain Assessment   Pain Assessment Tool Kay-Baker FACES   Kay-Baker FACES Pain Rating 4  (Abdominal area)   Restrictions/Precautions   Other Precautions Chair Alarm; Bed Alarm; Fall Risk;Hard of hearing   Home Living   Type of [address] [facility] [address] level;Stairs to enter with rails  (5 stairs to enter)   Home Equipment Walker;Cane   Additional Comments Patient reports using her cane during the day in her home and a walker at night for safety   Prior Function   Level of [place] Independent with ADLs; Independent with functional mobility   Lives With Alone   Receives Help From Family   IADLs Independent with driving   Comments Patient reports independence with ADLs and food shopping all prior to admission   General   Additional Pertinent History Chart reviewed, patient admitted with acute diverticulitis    Patient now presents as generally weak and deconditioned with abdominal discomfort and will benefit from continued physical therapy   Family/Caregiver Present No   Cognition   Overall Cognitive Status WFL   Arousal/Participation Cooperative   Attention Within functional limits   Orientation Level Oriented X4   Following Commands Follows all commands and directions without difficulty   Subjective   Subjective Patient states increased abdominal discomfort at this time   RLE Assessment   RLE Assessment   (Range of motion within functional limits, strength 3+/4 -)   LLE Assessment   LLE Assessment   (Range of motion within functional limits, strength 3+/4 -)   Coordination   Movements are Fluid and Coordinated 0   Bed Mobility   Supine to Sit 4  Minimal assistance   Additional items Assist x 1   Sit to Supine 4  Minimal assistance   Additional items Assist x 1   Transfers   Sit to Stand 4  Minimal assistance   Additional items Assist x 1   Stand to Sit 4  Minimal assistance   Additional items Assist x 1 Ambulation/Elevation   Gait Assistance 4  Minimal assist   Additional items Assist x 1;Verbal cues; Tactile cues   Assistive Device Rolling walker   Distance Few steps at bedside and patient requesting to return to bed due to abdominal discomfort and being out of bed earlier today   Balance   Static Sitting Fair +   Dynamic Sitting Fair   Static Standing Fair   Dynamic Standing [address],4, & 5 -   Activity Tolerance   Activity Tolerance Patient limited by fatigue   Nurse Made Aware Yes   Assessment   Prognosis Good   Problem List Decreased strength;Decreased endurance;Decreased range of motion; Impaired balance;Decreased mobility; Decreased coordination; Impaired hearing;Pain   Assessment Patient seen for Physical Therapy evaluation  Patient admitted with Acute diverticulitis  Comorbidities affecting patient's physical performance include:   Personal factors affecting patient at time of initial evaluation include: ambulating with assistive device, stairs to enter home, inability to ambulate household distances, inability to navigate community distances, inability to navigate level surfaces without external assistance, inability to perform dynamic tasks in community, inability to perform physical activity, inability to perform ADLS and inability to perform IADLS   Prior to admission, patient was independent with functional mobility with cane/roller walker, independent with ADLS, independent with IADLS, ambulating household distance, ambulating community distances and home with family assist   Please find objective findings from Physical Therapy assessment regarding body systems outlined above with impairments and limitations including weakness, decreased ROM, impaired balance, decreased endurance, impaired coordination, gait deviations, pain, decreased activity tolerance, decreased functional mobility tolerance and fall risk    The Barthel Index was used as a functional outcome tool presenting with a score of Barthel Index Score: 50 today indicating marked limitations of functional mobility and ADLS  Patient's clinical presentation is currently unstable/unpredictable as seen in patient's presentation of vital sign response, changing level of pain, increased fall risk, new onset of impairment of functional mobility, decreased endurance and new onset of weakness  Pt would benefit from continued Physical Therapy treatment to address deficits as defined above and maximize level of functional mobility  As demonstrated by objective findings, the assigned level of complexity for this evaluation is high  The patient's [facility] Basic Mobility Inpatient Short Form Raw Score is 17  A Raw score of greater than 16 suggests the patient may benefit from discharge to home  Please also refer to the recommendation of the Physical Therapist for safe discharge planning  Goals   Patient Goals To get stronger and go home   STG Expiration Date 01/23/23   Short Term Goal #1 Transfers and gait with roller walker with supervision   Short Term Goal #2 Gait endurance to 60 feet   LTG Expiration Date 01/30/23   Long Term Goal #1 Strength bilateral lower extremities 4/5   Long Term Goal #2 Independent transfers and gait with rolling walker/cane independently for functional household distances   Plan   Treatment/Interventions ADL retraining;Functional transfer training;LE strengthening/ROM; Elevations; Therapeutic exercise; Endurance training;Patient/family training;Equipment eval/education; Bed mobility;Gait training; Compensatory technique education   PT Frequency Other (Comment)  (5 times per week)   Recommendation   PT Discharge Recommendation Home with home health rehabilitation   [facility] Basic Mobility Inpatient   Turning in Flat Bed Without Bedrails 3   Lying on Back to Sitting on Edge of Flat Bed Without Bedrails 3   Moving Bed to Chair 3   Standing Up From Chair Using Arms 3   Walk in Room 3   Climb 3-5 Stairs With Railing 2   Basic Mobility Inpatient Raw Score 17   Basic Mobility Standardized Score 39 67   Highest Level Of Mobility   -[facility] Goal 5: Stand one or more mins   -HL Achieved 6: Walk 10 steps or more   Barthel Index   Feeding 10   Bathing 0   Grooming Score 0   Dressing Score 5   Bladder Score 5   Bowels Score 10   Toilet Use Score 5   Transfers (Bed/Chair) Score 10   Mobility (Level Surface) Score 0   Stairs Score 5   Barthel Index Score 50   Additional Treatment Session   Start Time 1430   End Time 1445   Treatment Assessment S: Abdominal pain and discomfort noted O: Bilateral lower extremity exercises completed as patient requested to return to bed A: Patient will benefit from continued physical therapy with progression as tolerated and follow-up home PT services   Exercises   Hamstring Sets Supine;5 reps;Bilateral   Quad Sets Supine;5 reps;Bilateral   Heelslides Supine;5 reps;Bilateral   Hip Abduction 5 reps;Bilateral;Supine   Knee AROM Short Arc Quad Supine;5 reps;Bilateral   Bridging Supine;5 reps   Licensure   [place] License Number  Vivian [facility], PT 4 0 [id number]

## 2023-01-17 PROBLEM — R19.7 NAUSEA VOMITING AND DIARRHEA: Status: RESOLVED | Noted: 2023-01-14 | Resolved: 2023-01-17

## 2023-01-17 PROBLEM — R11.2 NAUSEA VOMITING AND DIARRHEA: Status: RESOLVED | Noted: 2023-01-14 | Resolved: 2023-01-17

## 2023-01-17 LAB
ANION GAP SERPL CALCULATED.3IONS-SCNC: 12 MMOL/L (ref 4–13)
ATRIAL RATE: 69 BPM
BUN SERPL-MCNC: 14 MG/DL (ref 5–25)
CALCIUM SERPL-MCNC: 9 MG/DL (ref 8.3–10.1)
CHLORIDE SERPL-SCNC: 98 MMOL/L (ref 96–108)
CO2 SERPL-SCNC: 26 MMOL/L (ref 21–32)
CREAT SERPL-MCNC: 0.67 MG/DL (ref 0.6–1.3)
ERYTHROCYTE [DISTWIDTH] IN BLOOD BY AUTOMATED COUNT: 13.5 % (ref 11.6–15.1)
GFR SERPL CREATININE-BSD FRML MDRD: 74 ML/MIN/1.73SQ M
GLUCOSE SERPL-MCNC: 106 MG/DL (ref 65–140)
HCT VFR BLD AUTO: 40.3 % (ref 34.8–46.1)
HGB BLD-MCNC: 13.8 G/DL (ref 11.5–15.4)
MCH RBC QN AUTO: 31.5 PG (ref 26.8–34.3)
MCHC RBC AUTO-ENTMCNC: 34.2 G/DL (ref 31.4–37.4)
MCV RBC AUTO: 92 FL (ref 82–98)
P AXIS: 82 DEGREES
PLATELET # BLD AUTO: 248 THOUSANDS/UL (ref 149–390)
PMV BLD AUTO: 10.9 FL (ref 8.9–12.7)
POTASSIUM SERPL-SCNC: 3.7 MMOL/L (ref 3.5–5.3)
PR INTERVAL: 238 MS
QRS AXIS: -9 DEGREES
QRSD INTERVAL: 100 MS
QT INTERVAL: 418 MS
QTC INTERVAL: 447 MS
RBC # BLD AUTO: 4.38 MILLION/UL (ref 3.81–5.12)
SODIUM SERPL-SCNC: 136 MMOL/L (ref 135–147)
T WAVE AXIS: 73 DEGREES
VENTRICULAR RATE: 69 BPM
WBC # BLD AUTO: 8.87 THOUSAND/UL (ref 4.31–10.16)

## 2023-01-17 RX ADMIN — CEFTRIAXONE 1000 MG: 1 INJECTION, SOLUTION INTRAVENOUS at 13:01

## 2023-01-17 RX ADMIN — MECLIZINE HYDROCHLORIDE 25 MG: 25 TABLET ORAL at 14:42

## 2023-01-17 RX ADMIN — LEVOTHYROXINE SODIUM 75 MCG: 75 TABLET ORAL at 05:32

## 2023-01-17 RX ADMIN — METOCLOPRAMIDE 5 MG: 5 INJECTION, SOLUTION INTRAMUSCULAR; INTRAVENOUS at 23:05

## 2023-01-17 RX ADMIN — METOCLOPRAMIDE 5 MG: 5 INJECTION, SOLUTION INTRAMUSCULAR; INTRAVENOUS at 13:02

## 2023-01-17 RX ADMIN — METOCLOPRAMIDE 5 MG: 5 INJECTION, SOLUTION INTRAMUSCULAR; INTRAVENOUS at 05:32

## 2023-01-17 RX ADMIN — ENOXAPARIN SODIUM 30 MG: 30 INJECTION SUBCUTANEOUS at 09:05

## 2023-01-17 RX ADMIN — AMLODIPINE BESYLATE 5 MG: 5 TABLET ORAL at 09:05

## 2023-01-17 RX ADMIN — METRONIDAZOLE 500 MG: 500 INJECTION, SOLUTION INTRAVENOUS at 18:39

## 2023-01-17 RX ADMIN — PRAVASTATIN SODIUM 20 MG: 20 TABLET ORAL at 18:39

## 2023-01-17 RX ADMIN — NEBIVOLOL 5 MG: 10 TABLET ORAL at 09:05

## 2023-01-17 RX ADMIN — FAMOTIDINE 20 MG: 10 INJECTION, SOLUTION INTRAVENOUS at 21:50

## 2023-01-17 RX ADMIN — MECLIZINE HYDROCHLORIDE 25 MG: 25 TABLET ORAL at 05:32

## 2023-01-17 RX ADMIN — METOCLOPRAMIDE 5 MG: 5 INJECTION, SOLUTION INTRAMUSCULAR; INTRAVENOUS at 18:39

## 2023-01-17 RX ADMIN — METRONIDAZOLE 500 MG: 500 INJECTION, SOLUTION INTRAVENOUS at 02:39

## 2023-01-17 RX ADMIN — SODIUM CHLORIDE, SODIUM GLUCONATE, SODIUM ACETATE, POTASSIUM CHLORIDE, MAGNESIUM CHLORIDE, SODIUM PHOSPHATE, DIBASIC, AND POTASSIUM PHOSPHATE 75 ML/HR: .53; .5; .37; .037; .03; .012; .00082 INJECTION, SOLUTION INTRAVENOUS at 14:31

## 2023-01-17 RX ADMIN — METRONIDAZOLE 500 MG: 500 INJECTION, SOLUTION INTRAVENOUS at 10:37

## 2023-01-17 RX ADMIN — MECLIZINE HYDROCHLORIDE 25 MG: 25 TABLET ORAL at 21:50

## 2023-01-17 RX ADMIN — PANTOPRAZOLE SODIUM 40 MG: 40 INJECTION, POWDER, FOR SOLUTION INTRAVENOUS at 09:05

## 2023-01-17 NOTE — PLAN OF CARE
Problem: Potential for Falls  Goal: Patient will remain free of falls  Description: INTERVENTIONS:  - Educate patient/family on patient safety including physical limitations  - Instruct patient to call for assistance with activity   - Consult OT/PT to assist with strengthening/mobility   - Keep Call bell within reach  - Keep bed low and locked with side rails adjusted as appropriate  - Keep care items and personal belongings within reach  - Initiate and maintain comfort rounds  - Make Fall Risk Sign visible to staff  - Offer Toileting every 2 Hours, in advance of need  - Initiate/Maintain bed alarm  - Obtain necessary fall risk management equipment:   - Apply yellow socks and bracelet for high fall risk patients  - Consider moving patient to room near nurses station  Outcome: Progressing     Problem: MOBILITY - ADULT  Goal: Maintain or return to baseline ADL function  Description: INTERVENTIONS:  - Educate patient/family on patient safety including physical limitations  - Instruct patient to call for assistance with activity   - Consult OT/PT to assist with strengthening/mobility   - Keep Call bell within reach  - Keep bed low and locked with side rails adjusted as appropriate  - Keep care items and personal belongings within reach  - Initiate and maintain comfort rounds  - Make Fall Risk Sign visible to staff  - Offer Toileting every 2 Hours, in advance of need  - Initiate/Maintain bed alarm  - Obtain necessary fall risk management equipment:   - Apply yellow socks and bracelet for high fall risk patients  - Consider moving patient to room near nurses station  Outcome: Progressing  Goal: Maintains/Returns to pre admission functional level  Description: INTERVENTIONS:  - Perform BMAT or MOVE assessment daily    - Set and communicate daily mobility goal to care team and patient/family/caregiver  - Collaborate with rehabilitation services on mobility goals if consulted  - Perform Range of Motion 4 times a day    - Reposition patient every 2 hours    - Dangle patient 2 times a day  - Stand patient 2 times a day  - Ambulate patient 3 times a day  - Out of bed to chair 2 times a day   - Out of bed for meals 2 times a day  - Out of bed for toileting  - Record patient progress and toleration of activity level   Outcome: Progressing     Problem: PAIN - ADULT  Goal: Verbalizes/displays adequate comfort level or baseline comfort level  Description: Interventions:  - Encourage patient to monitor pain and request assistance  - Assess pain using appropriate pain scale  - Administer analgesics based on type and severity of pain and evaluate response  - Implement non-pharmacological measures as appropriate and evaluate response  - Consider cultural and social influences on pain and pain management  - Notify physician/advanced practitioner if interventions unsuccessful or patient reports new pain  Outcome: Progressing     Problem: INFECTION - ADULT  Goal: Absence or prevention of progression during hospitalization  Description: INTERVENTIONS:  - Assess and monitor for signs and symptoms of infection  - Monitor lab/diagnostic results  - Monitor all insertion sites, i e  indwelling lines, tubes, and drains  - Monitor endotracheal if appropriate and nasal secretions for changes in amount and color  - [place] appropriate cooling/warming therapies per order  - Administer medications as ordered  - Instruct and encourage patient and family to use good hand hygiene technique  - Identify and instruct in appropriate isolation precautions for identified infection/condition  Outcome: Progressing  Goal: Absence of fever/infection during neutropenic period  Description: INTERVENTIONS:  - Monitor WBC    Outcome: Progressing     Problem: SAFETY ADULT  Goal: Patient will remain free of falls  Description: INTERVENTIONS:  - Educate patient/family on patient safety including physical limitations  - Instruct patient to call for assistance with activity   - Consult OT/PT to assist with strengthening/mobility   - Keep Call bell within reach  - Keep bed low and locked with side rails adjusted as appropriate  - Keep care items and personal belongings within reach  - Initiate and maintain comfort rounds  - Make Fall Risk Sign visible to staff  - Offer Toileting every 2 Hours, in advance of need  - Initiate/Maintain bed alarm  - Obtain necessary fall risk management equipment:   - Apply yellow socks and bracelet for high fall risk patients  - Consider moving patient to room near nurses station  Outcome: Progressing  Goal: Maintain or return to baseline ADL function  Description: INTERVENTIONS:  - Educate patient/family on patient safety including physical limitations  - Instruct patient to call for assistance with activity   - Consult OT/PT to assist with strengthening/mobility   - Keep Call bell within reach  - Keep bed low and locked with side rails adjusted as appropriate  - Keep care items and personal belongings within reach  - Initiate and maintain comfort rounds  - Make Fall Risk Sign visible to staff  - Offer Toileting every 2 Hours, in advance of need  - Initiate/Maintain bed alarm  - Obtain necessary fall risk management equipment:   - Apply yellow socks and bracelet for high fall risk patients  - Consider moving patient to room near nurses station  Outcome: Progressing  Goal: Maintains/Returns to pre admission functional level  Description: INTERVENTIONS:  - Perform BMAT or MOVE assessment daily    - Set and communicate daily mobility goal to care team and patient/family/caregiver  - Collaborate with rehabilitation services on mobility goals if consulted  - Perform Range of Motion 4 times a day  - Reposition patient every 2 hours    - Dangle patient 2 times a day  - Stand patient 2 times a day  - Ambulate patient 3 times a day  - Out of bed to chair 2 times a day   - Out of bed for meals 2 times a day  - Out of bed for toileting  - Record patient progress and toleration of activity level   Outcome: Progressing     Problem: DISCHARGE PLANNING  Goal: Discharge to home or other facility with appropriate resources  Description: INTERVENTIONS:  - Identify barriers to discharge w/patient and caregiver  - Arrange for needed discharge resources and transportation as appropriate  - Identify discharge learning needs (meds, wound care, etc )  - Arrange for interpretive services to assist at discharge as needed  - Refer to Case Management Department for coordinating discharge planning if the patient needs post-hospital services based on physician/advanced practitioner order or complex needs related to functional status, cognitive ability, or social support system  Outcome: Progressing     Problem: Knowledge Deficit  Goal: Patient/family/caregiver demonstrates understanding of disease process, treatment plan, medications, and discharge instructions  Description: Complete learning assessment and assess knowledge base    Interventions:  - Provide teaching at level of understanding  - Provide teaching via preferred learning methods  Outcome: Progressing     Problem: GASTROINTESTINAL - ADULT  Goal: Minimal or absence of nausea and/or vomiting  Description: INTERVENTIONS:  - Administer IV fluids if ordered to ensure adequate hydration  - Maintain NPO status until nausea and vomiting are resolved  - Nasogastric tube if ordered  - Administer ordered antiemetic medications as needed  - Provide nonpharmacologic comfort measures as appropriate  - Advance diet as tolerated, if ordered  - Consider nutrition services referral to assist patient with adequate nutrition and appropriate food choices  Outcome: Progressing     Problem: Prexisting or High Potential for Compromised Skin Integrity  Goal: Skin integrity is maintained or improved  Description: INTERVENTIONS:  - Identify patients at risk for skin breakdown  - Assess and monitor skin integrity  - Assess and monitor nutrition and hydration status  - Monitor labs   - Assess for incontinence   - Turn and reposition patient  - Assist with mobility/ambulation  - Relieve pressure over bony prominences  - Avoid friction and shearing  - Provide appropriate hygiene as needed including keeping skin clean and dry  - Evaluate need for skin moisturizer/barrier cream  - Collaborate with interdisciplinary team   - Patient/family teaching  - Consider wound care consult   Outcome: Progressing     Problem: Nutrition/Hydration-ADULT  Goal: Nutrient/Hydration intake appropriate for improving, restoring or maintaining nutritional needs  Description: Monitor and assess patient's nutrition/hydration status for malnutrition  Collaborate with interdisciplinary team and initiate plan and interventions as ordered  Monitor patient's weight and dietary intake as ordered or per policy  Utilize nutrition screening tool and intervene as necessary  Determine patient's food preferences and provide high-protein, high-caloric foods as appropriate       INTERVENTIONS:  - Monitor oral intake, urinary output, labs, and treatment plans  - Assess nutrition and hydration status and recommend course of action  - Evaluate amount of meals eaten  - Assist patient with eating if necessary   - Allow adequate time for meals  - Recommend/ encourage appropriate diets, oral nutritional supplements, and vitamin/mineral supplements  - Order, calculate, and assess calorie counts as needed  - Recommend, monitor, and adjust tube feedings and TPN/PPN based on assessed needs  - Assess need for intravenous fluids  - Provide specific nutrition/hydration education as appropriate  - Include patient/family/caregiver in decisions related to nutrition  Outcome: Progressing

## 2023-01-17 NOTE — PHYSICAL THERAPY NOTE
PT TREATMENT     01/17/23 1455   Note Type   Note Type Treatment   Pain Assessment   Pain Assessment Tool 0-10   Pain Score No Pain   Restrictions/Precautions   Other Precautions Fall Risk   General   Chart Reviewed Yes   Cognition   Overall Cognitive Status WFL   Subjective   Subjective "I just don't feel good  I haven't been able to eat anything"   Bed Mobility   Supine to Sit 4  Minimal assistance   Transfers   Sit to Stand 4  Minimal assistance   Stand to Sit 4  Minimal assistance   Stand pivot 4  Minimal assistance   Toilet transfer 4  Minimal assistance   Additional items Commode   Additional Comments Pt declined additional activity or sitting OOB despite much encouragement  Assessment   Prognosis Good   Problem List Decreased strength;Decreased endurance;Decreased mobility; Impaired balance   Assessment Pt was only agreeable to minimal activity today as pt reports she is just not feeling that well  Plan to encourage pt to sit up OOB and ambulate next session as pt plans on going home upon DC from the hospital and lives alone  The patient's AM-PAC Basic Mobility Inpatient Short Form Raw Score is 16  A Raw score of less than or equal to 16 suggests the patient may benefit from discharge to post-acute rehabilitation services, however anticipate as pt's medical condition improves so will her physical function, so recommend home PT  Will continue to assess pt's function and encourage OOB/mobilization with nursing staff and therapy  Plan   Treatment/Interventions Gait training;Bed mobility; Equipment eval/education;Patient/family training; Endurance training; Therapeutic exercise;Elevations; ADL retraining;Functional transfer training;LE strengthening/ROM   Progress Progressing toward goals   PT Frequency Other (Comment)  (5x/w)   Recommendation   PT Discharge Recommendation Home with home health rehabilitation  (pending further PT assessment)   Equipment Recommended   (pt has a cane and a walker) AM-PAC Basic Mobility Inpatient   Turning in Flat Bed Without Bedrails 3   Lying on Back to Sitting on Edge of Flat Bed Without Bedrails 3   Moving Bed to Chair 3   Standing Up From Chair Using Arms 3   Walk in Room 2   Climb 3-5 Stairs With Railing 2   Basic Mobility Inpatient Raw Score 16   Basic Mobility Standardized Score 38 32   Highest Level Of Mobility   JH-HLM Goal 5: Stand one or more mins   JH-HLM Achieved 4: Move to chair/commode  (pt declined any other activity)   End of Consult   Patient Position at End of Consult All needs within reach;Bed/Chair alarm activated;Supine   Licensure   [place] License Number  Sanyt Titus PT [id number]

## 2023-01-17 NOTE — PLAN OF CARE
[facility]    [address]    [facility] [address]    Phone:  [phone]    Fax:  [phone]                                       Lorna Kenyon   MRN: [id number]    Department:  [facility] Emergency Department   Date of Visit:  7/2/2018           Patient Information     Date Of Birth          1985        Your diagnoses for this visit were:     Acute viral bronchitis        You were seen by Donn Houston MD.      Follow-up Information     Follow up with [facility] In 3 days.    Why:  As needed    Contact information:    [address], [address]  [facility] [address]          Discharge Instructions       Please make an appointment to follow up with your primary care provider in 2-3 days if not improving.        Viral Bronchitis (Adult)    You have a viral bronchitis. Bronchitis is inflammation and swelling of the lining of the lungs. This is often caused by an infection. Symptoms include a dry, hacking cough that is worse at night. The cough may bring up yellow-green mucus. You may also feel short of breath or wheeze. Other symptoms may include tiredness, chest discomfort, and chills.  Bronchitis that is caused by a virus is not treated with antibiotics. Instead, medicines may be given to help relieve symptoms. Symptoms can last up to 2 weeks, although the cough may last much longer.  This illness is contagious during the first few days and is spread through the air by coughing and sneezing, or by direct contact (touching the sick person and then touching your own eyes, nose, or mouth).  Most viral illnesses resolve within 10 to 14 days with rest and simple home remedies, although they may sometimes last for several weeks.  Home care    If symptoms are severe, rest at home for the first 2 to 3 days. When you go back to your usual activities, don't let yourself get too tired.    Do not smoke. Also avoid being  Problem: Potential for Falls  Goal: Patient will remain free of falls  Description: INTERVENTIONS:  - Educate patient/family on patient safety including physical limitations  - Instruct patient to call for assistance with activity   - Consult OT/PT to assist with strengthening/mobility   - Keep Call bell within reach  - Keep bed low and locked with side rails adjusted as appropriate  - Keep care items and personal belongings within reach  - Initiate and maintain comfort rounds  - Make Fall Risk Sign visible to staff  - Offer Toileting every 2 Hours, in advance of need  - Initiate/Maintain bed alarm  - Obtain necessary fall risk management equipment:   - Apply yellow socks and bracelet for high fall risk patients  - Consider moving patient to room near nurses station  Outcome: Progressing     Problem: MOBILITY - ADULT  Goal: Maintain or return to baseline ADL function  Description: INTERVENTIONS:  - Educate patient/family on patient safety including physical limitations  - Instruct patient to call for assistance with activity   - Consult OT/PT to assist with strengthening/mobility   - Keep Call bell within reach  - Keep bed low and locked with side rails adjusted as appropriate  - Keep care items and personal belongings within reach  - Initiate and maintain comfort rounds  - Make Fall Risk Sign visible to staff  - Offer Toileting every 2 Hours, in advance of need  - Initiate/Maintain bed alarm  - Obtain necessary fall risk management equipment:   - Apply yellow socks and bracelet for high fall risk patients  - Consider moving patient to room near nurses station  Outcome: Progressing  Goal: Maintains/Returns to pre admission functional level  Description: INTERVENTIONS:  - Perform BMAT or MOVE assessment daily    - Set and communicate daily mobility goal to care team and patient/family/caregiver  - Collaborate with rehabilitation services on mobility goals if consulted  - Perform Range of Motion 3 times a day    - Reposition patient every 2 hours  - Dangle patient 3 times a day  - Stand patient 3 times a day  - Ambulate patient 3 times a day  - Out of bed to chair 3 times a day   - Out of bed for meals 3  Problem: PAIN - ADULT  Goal: Verbalizes/displays adequate comfort level or baseline comfort level  Description: Interventions:  - Encourage patient to monitor pain and request assistance  - Assess pain using appropriate pain scale  - Administer analgesics based on type and severity of pain and evaluate response  - Implement non-pharmacological measures as appropriate and evaluate response  - Consider cultural and social influences on pain and pain management  - Notify physician/advanced practitioner if interventions unsuccessful or patient reports new pain  Outcome: Progressing     Problem: INFECTION - ADULT  Goal: Absence or prevention of progression during hospitalization  Description: INTERVENTIONS:  - Assess and monitor for signs and symptoms of infection  - Monitor lab/diagnostic results  - Monitor all insertion sites, i e  indwelling lines, tubes, and drains  - Monitor endotracheal if appropriate and nasal secretions for changes in amount and color  - [place] appropriate cooling/warming therapies per order  - Administer medications as ordered  - Instruct and encourage patient and family to use good hand hygiene technique  - Identify and instruct in appropriate isolation precautions for identified infection/condition  Outcome: Progressing  Goal: Absence of fever/infection during neutropenic period  Description: INTERVENTIONS:  - Monitor WBC    Outcome: Progressing     Problem: Nutrition/Hydration-ADULT  Goal: Nutrient/Hydration intake appropriate for improving, restoring or maintaining nutritional needs  Description: Monitor and assess patient's nutrition/hydration status for malnutrition  Collaborate with interdisciplinary team and initiate plan and interventions as ordered    Monitor patient's weight and dietary exposed to secondhand smoke.    You may use over-the-counter medicine to control fever or pain, unless another pain medicine was prescribed. If you have chronic liver or kidney disease or have ever had a stomach ulcer or gastrointestinal bleeding, talk with your healthcare provider before using these medicines. Also talk to your provider if you are taking medicine to prevent blood clots. Aspirin should never be given to anyone younger than 18 years of age who is ill with a viral infection or fever. It may cause severe liver or brain damage.    Your appetite may be poor, so a light diet is fine. Avoid dehydration by drinking 6 to 8 glasses of fluids per day (such as water, soft drinks, sports drinks, juices, tea, or soup). Extra fluids will help loosen secretions in the nose and lungs.    Over-the-counter cough, cold, and sore-throat medicines will not shorten the length of the illness, but they may help to reduce symptoms. Don't use decongestants if you have high blood pressure.  Follow-up care  Follow up with your healthcare provider, or as advised. If you had an X-ray or ECG (electrocardiogram), a specialist will review it. You will be notified of any new findings that may affect your care.  If you are age 65 or older, or if you have a chronic lung disease or condition that affects your immune system, or you smoke, ask your healthcare provider about getting a pneumococcal vaccine and a yearly flu shot (influenza vaccine).  When to seek medical advice  Call your healthcare provider right away if any of these occur:    Fever of 100.4 F (38 C) or higher, or as directed by your healthcare provider    Coughing up increased amounts of colored sputum    Weakness, drowsiness, headache, facial pain, ear pain, or a stiff neck  Call 911  Call 911 if any of these occur:    Coughing up blood    Worsening weakness, drowsiness, headache, or stiff neck    Trouble breathing, wheezing, or pain with breathing  Date Last Reviewed:  9/13/2015 2000-2017 [employer]. [address] [place] [address], [place] [address]. All rights reserved. This information is not intended as a substitute for professional medical care. Always follow your healthcare professional's instructions.            24 Hour Appointment Hotline       To make an appointment at any [facility], call [phone] ([phone]). If you don't have a family doctor or clinic, we will help you find one. [facility] are conveniently located to serve the needs of you and your family.             Review of your medicines      Our records show that you are taking the medicines listed below. If these are incorrect, please call your family doctor or clinic.        Dose / Directions Last dose taken    PRENATAL VITAMIN PO   Dose:  1 tablet        Take 1 tablet by mouth At Bedtime   Refills:  0                Procedures and tests performed during your visit     Basic metabolic panel    CBC with platelets differential    UA with Microscopic    XR Chest 2 Views      Orders Needing Specimen Collection     None      Pending Results     No orders found from 6/30/2018 to 7/3/2018.            Pending Culture Results     No orders found from 6/30/2018 to 7/3/2018.            Pending Results Instructions     If you had any lab results that were not finalized at the time of your Discharge, you can call the ED Lab Result RN at [phone]. You will be contacted by this team for any positive Lab results or changes in treatment. The nurses are available 7 days a week from 10A to 6:30P.  You can leave a message 24 hours per day and they will return your call.        Test Results From Your Hospital Stay        7/2/2018 10:02 AM      Component Results     Component Value Ref Range & Units Status    WBC 8.8 4.0 - 11.0 10e9/L Final    RBC Count 3.88 3.8 - 5.2 10e12/L Final    Hemoglobin 12.1 11.7 - 15.7 g/dL Final    Hematocrit 35.4 35.0 - 47.0 % Final    MCV 91 78 - 100 fl Final    MCH  intake as ordered or per policy  Utilize nutrition screening tool and intervene as necessary  Determine patient's food preferences and provide high-protein, high-caloric foods as appropriate       INTERVENTIONS:  - Monitor oral intake, urinary output, labs, and treatment plans  - Assess nutrition and hydration status and recommend course of action  - Evaluate amount of meals eaten  - Assist patient with eating if necessary   - Allow adequate time for meals  - Recommend/ encourage appropriate diets, oral nutritional supplements, and vitamin/mineral supplements  - Order, calculate, and assess calorie counts as needed  - Recommend, monitor, and adjust tube feedings and TPN/PPN based on assessed needs  - Assess need for intravenous fluids  - Provide specific nutrition/hydration education as appropriate  - Include patient/family/caregiver in decisions related to nutrition  Outcome: Progressing    times a day  - Out of bed for toileting  - Record patient progress and toleration of activity level   Outcome: Progressing 31.2 26.5 - 33.0 pg Final    MCHC 34.2 31.5 - 36.5 g/dL Final    RDW 12.8 10.0 - 15.0 % Final    Platelet Count 131 (L) 150 - 450 10e9/L Final    Diff Method Automated Method  Final    % Neutrophils 73.7 % Final    % Lymphocytes 11.8 % Final    % Monocytes 10.2 % Final    % Eosinophils 3.2 % Final    % Basophils 0.3 % Final    % Immature Granulocytes 0.8 % Final    Nucleated RBCs 0 0 /100 Final    Absolute Neutrophil 6.5 1.6 - 8.3 10e9/L Final    Absolute Lymphocytes 1.0 0.8 - 5.3 10e9/L Final    Absolute Monocytes 0.9 0.0 - 1.3 10e9/L Final    Absolute Eosinophils 0.3 0.0 - 0.7 10e9/L Final    Absolute Basophils 0.0 0.0 - 0.2 10e9/L Final    Abs Immature Granulocytes 0.1 0 - 0.4 10e9/L Final    Absolute Nucleated RBC 0.0  Final         7/2/2018 10:24 AM      Component Results     Component Value Ref Range & Units Status    Sodium 138 133 - 144 mmol/L Final    Potassium 3.9 3.4 - 5.3 mmol/L Final    Specimen slightly hemolyzed, potassium may be falsely elevated    Chloride 106 94 - 109 mmol/L Final    Carbon Dioxide 22 20 - 32 mmol/L Final    Anion Gap 10 3 - 14 mmol/L Final    Glucose 75 70 - 99 mg/dL Final    Urea Nitrogen 4 (L) 7 - 30 mg/dL Final    Creatinine 0.38 (L) 0.52 - 1.04 mg/dL Final    GFR Estimate >90 >60 mL/min/1.7m2 Final    Non  GFR Calc    GFR Estimate If Black >90 >60 mL/min/1.7m2 Final    African American GFR Calc    Calcium 8.7 8.5 - 10.1 mg/dL Final         7/2/2018  9:52 AM      Narrative     XR CHEST 2 VW 7/2/2018 9:39 AM    HISTORY: Cough.    COMPARISON: None.        Impression     IMPRESSION: The lungs are clear. No focal pulmonary opacities. Heart  and mediastinum are unremarkable. No acute cardiopulmonary  abnormalities.    CARMEN LINDA MD         7/2/2018  9:33 AM      Component Results     Component Value Ref Range & Units Status    Color Urine Straw  Final    Appearance Urine Clear  Final    Glucose Urine Negative NEG^Negative mg/dL Final    Bilirubin Urine Negative  NEG^Negative Final    Ketones Urine Negative NEG^Negative mg/dL Final    Specific Gravity Urine 1.002 (L) 1.003 - 1.035 Final    Blood Urine Negative NEG^Negative Final    pH Urine 7.0 5.0 - 7.0 pH Final    Protein Albumin Urine Negative NEG^Negative mg/dL Final    Urobilinogen mg/dL 0.0 0.0 - 2.0 mg/dL Final    Nitrite Urine Negative NEG^Negative Final    Leukocyte Esterase Urine Negative NEG^Negative Final    Source Midstream Urine  Final    WBC Urine <1 0 - 5 /HPF Final    RBC Urine <1 0 - 2 /HPF Final    Bacteria Urine Few (A) NEG^Negative /HPF Final    Squamous Epithelial /HPF Urine <1 0 - 1 /HPF Final                Clinical Quality Measure: Blood Pressure Screening     Your blood pressure was checked while you were in the emergency department today. The last reading we obtained was  BP: 91/61 . Please read the guidelines below about what these numbers mean and what you should do about them.  If your systolic blood pressure (the top number) is less than 120 and your diastolic blood pressure (the bottom number) is less than 80, then your blood pressure is normal. There is nothing more that you need to do about it.  If your systolic blood pressure (the top number) is 120-139 or your diastolic blood pressure (the bottom number) is 80-89, your blood pressure may be higher than it should be. You should have your blood pressure rechecked within a year by a primary care provider.  If your systolic blood pressure (the top number) is 140 or greater or your diastolic blood pressure (the bottom number) is 90 or greater, you may have high blood pressure. High blood pressure is treatable, but if left untreated over time it can put you at risk for heart attack, stroke, or kidney failure. You should have your blood pressure rechecked by a primary care provider within the next 4 weeks.  If your provider in the emergency department today gave you specific instructions to follow-up with your doctor or provider even sooner than  "that, you should follow that instruction and not wait for up to 4 weeks for your follow-up visit.        Thank you for choosing [facility]       Thank you for choosing [facility] for your care. Our goal is always to provide you with excellent care. Hearing back from our patients is one way we can continue to improve our services. Please take a few minutes to complete the written survey that you may receive in the mail after you visit with us. Thank you!        [facility] [employer] Information     [employer] lets you send messages to your doctor, view your test results, renew your prescriptions, schedule appointments and more. To sign up, go to [email or web address] . Click on \"Log in\" on the left side of the screen, which will take you to the Welcome page. Then click on \"Sign up Now\" on the right side of the page.     You will be asked to enter the access code listed below, as well as some personal information. Please follow the directions to create your username and password.     Your access code is: [email or web address]  Expires: 2018 10:28 AM     Your access code will  in 90 days. If you need help or a new code, please call your [facility] clinic or [phone].        Care EveryWhere ID     This is your Care EveryWhere ID. This could be used by other organizations to access your [facility] medical records  [id number]        Equal Access to Services     LOGAN SON : Jeaneth valdezo Sobilly, waaxda luqadaha, qaybta kaalmaflynn merida, matthias drake. So [facility] [phone].    ATENCIÓN: Si habla español, tiene a cisse disposición servicios gratuitos de asistencia lingüística. Dontae al [phone].    We comply with applicable federal civil rights laws and [place] laws. We do not discriminate on the basis of race, color, national origin, age, disability, sex, sexual orientation, or gender identity.            After Visit Summary       This is your record. Keep this with you and show to your " community pharmacist(s) and doctor(s) at your next visit.

## 2023-01-17 NOTE — CASE MANAGEMENT
Case Management Assessment & Discharge Planning Note    Patient name Yeny Mckinney  Location 2 [facility] 219/[address] MRN [id number]  : 1927 Date 2023       Current Admission Date: 2023  Current Admission Diagnosis:Acute diverticulitis   Patient Active Problem List    Diagnosis Date Noted   • Anemia 01/15/2023   • Acute diverticulitis 2023   • Nausea vomiting and diarrhea 2023   • Age-related osteoporosis without current pathological fracture 2022   • Primary hypertension 10/26/2022   • Dyslipidemia 10/26/2022   • Acquired hypothyroidism 10/26/2022   • Gastroesophageal reflux disease without esophagitis 10/26/2022   • Osteoarthritis of multiple joints 10/26/2022      LOS (days): 3  Geometric Mean LOS (GMLOS) (days): 2 60  Days to GMLOS:-0 5     OBJECTIVE:    Risk of Unplanned Readmission Score: 11 01         Current admission status: Inpatient       Preferred Pharmacy:   [facility] [id number] [place] [address]  Phone: [phone] Fax: [phone]    [address] [facility] [place] [place] [address]  Phone: [phone] Fax: [phone]    Primary Care Provider: Rosalina Bui MD    Primary Insurance: MEDICARE  Secondary Insurance: BLUE CROSS    ASSESSMENT:  Roxanne Collado Proxies    There are no active Health Care Proxies on file                        Patient Information  Mental Status: Alert  Assessment information provided by[de-identified] Daughter (Daughter Kiya Escoto provided information as pt has hard of hearing and did not have her hearing aid with her )  Primary Caregiver: Self  Support Systems: Self, [facility] of Residence: [address] [place] [address] do you live in?: [place]  Type of Current Residence: Apartment  Floor Level: 1  Upon entering residence, is there a bathroom on the main floor (no further steps)?: Yes  In the last 12 months, was there a time when you were not able to pay the mortgage or rent on time?: No  In the last 12 months, how many places have you lived?: 1  In the last 12 months, was there a time when you did not have a steady place to sleep or slept in a shelter (including now)?: No  Homeless/housing insecurity resource given?: N/A    Activities of Daily Living Prior to Admission  Functional Status: Independent  Completes ADLs independently?: Yes  Ambulates independently?: Yes  Does patient use assisted devices?: Yes  Assisted Devices (DME) used: Francois Godoy (uses cane during the day and a walker at night if pt needs ever needs to use bathroom during night)  Does patient currently own DME?: Yes  What DME does the patient currently own?: Walt Cheney  Does patient have a history of Outpatient Therapy (PT/OT)?: No  Does the patient have a history of Short-Term Rehab?: Yes ([facility] in 2015)  Does patient have a history of HHC?: Yes (daughter unable to recall name of prior agency name)  Does patient currently have [facility]?: No         Patient Information Continued  Income Source: Pension/FDC  Does patient have prescription coverage?: Yes  Within the past 12 months, you worried that your food would run out before you got the money to buy more : Never true  Within the past 12 months, the food you bought just didn't last and you didn't have money to get more : Never true  Food insecurity resource given?: N/A  Does patient receive dialysis treatments?: No  Does patient have a history of substance abuse?: No  Does patient have a history of Mental Health Diagnosis?: No         Means of Transportation  Means of Transport to Appts[de-identified] Drives Self  In the past 12 months, has lack of transportation kept you from medical appointments or from getting medications?: No  In the past 12 months, has lack of transportation kept you from meetings, work, or from getting things needed for daily living?: No  Was application for public transport provided?: N/A        DISCHARGE DETAILS:    Discharge planning discussed with[de-identified] patient and daughter Quynh Braun of Choice: Yes  Comments - Freedom of Choice: Home care recommendations for home therapy discussed with daughter  She is open to home therapy for pt  FOC discussed with daughter  She did not have a preference and open to a blanket referral   CM contacted family/caregiver?: Yes  Were Treatment Team discharge recommendations reviewed with patient/caregiver?: Yes     Were patient/caregiver advised of the risks associated with not following Treatment Team discharge recommendations?: Yes    Contacts  Patient Contacts: Jocy  Relationship to Patient[de-identified] Family  Contact Method: Phone  Phone Number: [phone]  Reason/Outcome: Discharge Planning, [address]         Is the patient interested in [facility] at discharge?: Yes  Via Dalila Camarillo requested[de-identified] Occupational Therapy, Physical Therapy    DME Referral Provided  Referral made for DME?: No    Other Referral/Resources/Interventions Provided:  Referral Comments: Spoke with pt's daughter Mohinder Betancourt who provided information for initial assessment  Pt lives alone in a 1st floor apt  Pt is alert/oriented and independent at baseline with ambulation and ADLs  Uses a cane during the day and a walker at night if she wakes up to use bathroom  Pt drives  Daughter denies pt having an barriers to affording or obtaining meds  Home therapy recommendations discussed and daughter did not have a preference in an agency  [facility] referral Referral placed  Will continue to follow for discharge planning           Treatment Team Recommendation: Home with [address] [facility]  Discharge Destination Plan[de-identified] Home with [address] [facility] [facility]

## 2023-01-17 NOTE — APP STUDENT NOTE
*Acute Diverticulitis   Patient presented to ED with nausea, vomiting, and diarrhea x1 day   CT Scan showed Acute Diverticulitis in the distal sigmoid colon surrounding inflammatory changes  No obstruction, free air, or abscess noted  - Continue patient on IV Rocephin and IV metronidazole   - Tolerating liquid diet well, advance to low residue lactose restricted diet per GI  - Continue zofran PRN nausea   - Continue IV fluids   - Potassium level at 2 9; 1 dose potassium supplementation 1/16; currently stabilized at 3 7 1/17   - Monitor magnesium    - GI input appreciated   - KUB ordered and showed: Slightly prominent loops of air-filled small bowel are seen throughout the abdomen, possibly representing a mild ileus   - Continue IV fluids   - Continue to monitor potassium and magnesium   - Continue clear liquid diet   - Monitor abdominal pain and symptoms         Nausea, Vomiting and Diarrhea   Patient presented with nausea, vomiting and diarrhea x1 day   History of acute diverticulitis; patient reports history of lactose intolerance  Afebrile with mild leukocytosis   CT as stated above   - IV Zofran PRN nausea   - IV metronidazole and IV rocephin  - IV metoclopramide   - IV PPI, Pepcid   - IV Fluids  - GI evaluation   - Stool studies if diarrhea  - Monitor abdominal pain and symptoms      Anemia   Monitor     Gastroesophageal Reflux Disease without esophagitis   Continue PPI, changed to IV   IV pepcid nightly      Acquired Hypothyroidism  Continue Synthroid         Primary Hypertension   Elevated upon admission, stabilizing now   Continue home meds; amlodipine and bystolic   Hold Hydrochlorothiazide     Dyslipidemia  Continue statin     Osteoarthritis of multiple joints   PT/OT       VTE Pharmacologic Prophylaxis: VTE Score: 5 High Risk (Score >/= 5) - Pharmacological DVT Prophylaxis Ordered: enoxaparin (Lovenox)  Sequential Compression Devices Ordered      Patient Centered Rounds: I performed bedside rounds with nursing staff today  Discussions with Specialists or Other Care Team Provider: Gastroenterology    Education and Discussions with Family / Patient: discussed with patient        Time Spent for Care: 30 minutes  More than 50% of total time spent on counseling and coordination of care as described above  Current Length of Stay: 3 day(s)  Current Patient Status: Inpatient   Certification Statement: The patient will continue to require additional inpatient hospital stay due to acute diverticulitis with new findings of mild ileus  Discharge Plan: Anticipate discharge in 48-72 hrs to home with home services  Code Status: Level 1 - Full Code    Subjective:   Patient reports no abdominal pain today  Patient denies any episodes of nausea or vomiting  Patient states she has not yet had a bowel movement  Patient denies any fever, chills, chest pain, shortness of breath, dysuria, hematuria, or swelling in the legs  Patient states she is tolerating liquid diet well  There is still mild abdominal distension upon exam but no tenderness with palpation  Objective:     Vitals:   Temp (24hrs), Av 1 °F (36 7 °C), Min:97 6 °F (36 4 °C), Max:98 7 °F (37 1 °C)    Temp:  [97 6 °F (36 4 °C)-98 7 °F (37 1 °C)] 97 6 °F (36 4 °C)  HR:  [64-74] 74  Resp:  [18] 18  BP: (149-162)/(53-63) 157/59  SpO2:  [95 %-96 %] 95 %  Body mass index is 26 86 kg/m²  Input and Output Summary (last 24 hours): Intake/Output Summary (Last 24 hours) at 2023 0857  Last data filed at 2023 1442  Gross per 24 hour   Intake --   Output 750 ml   Net -750 ml       Physical Exam:   Physical Exam  Vitals and nursing note reviewed  Constitutional:       General: She is not in acute distress  Appearance: She is well-developed  HENT:      Head: Normocephalic and atraumatic  Eyes:      Conjunctiva/sclera: Conjunctivae normal    Cardiovascular:      Rate and Rhythm: Normal rate and regular rhythm        Heart sounds: No murmur heard   Pulmonary:      Effort: Pulmonary effort is normal  No respiratory distress  Breath sounds: Normal breath sounds  Abdominal:      General: Bowel sounds are normal  There is distension  Palpations: Abdomen is soft  There is no hepatomegaly, splenomegaly or mass  Tenderness: There is no abdominal tenderness  There is no right CVA tenderness, left CVA tenderness, guarding or rebound  Musculoskeletal:         General: No swelling  Cervical back: Neck supple  Skin:     General: Skin is warm and dry  Capillary Refill: Capillary refill takes less than 2 seconds  Neurological:      Mental Status: She is alert  Psychiatric:         Mood and Affect: Mood normal           Additional Data:     Labs:  Results from last 7 days   Lab Units 01/17/23  0620 01/15/23  0607 01/14/23  0606   WBC Thousand/uL 8 87   < > 10 47*   HEMOGLOBIN g/dL 13 8   < > 13 1   HEMATOCRIT % 40 3   < > 39 3   PLATELETS Thousands/uL 248   < > 175   NEUTROS PCT %  --   --  84*   LYMPHS PCT %  --   --  10*   MONOS PCT %  --   --  4   EOS PCT %  --   --  0    < > = values in this interval not displayed  Results from last 7 days   Lab Units 01/17/23  0620 01/16/23  0902 01/15/23  0607   SODIUM mmol/L 136   < > 142   POTASSIUM mmol/L 3 7   < > 3 4*   CHLORIDE mmol/L 98   < > 102   CO2 mmol/L 26   < > 32   BUN mg/dL 14   < > 14   CREATININE mg/dL 0 67   < > 1 01   ANION GAP mmol/L 12   < > 8   CALCIUM mg/dL 9 0   < > 8 7   ALBUMIN g/dL  --   --  2 6*   TOTAL BILIRUBIN mg/dL  --   --  0 70   ALK PHOS U/L  --   --  40*   ALT U/L  --   --  14   AST U/L  --   --  18   GLUCOSE RANDOM mg/dL 106   < > 98    < > = values in this interval not displayed                   Results from last 7 days   Lab Units 01/14/23  0606   LACTIC ACID mmol/L 2 0       Lines/Drains:  Invasive Devices     Peripheral Intravenous Line  Duration           Peripheral IV 01/15/23 Left Antecubital 1 day          Drain  Duration           External Urinary Catheter 1 day                  Telemetry:  Telemetry Orders (From admission, onward)             24 Hour Telemetry Monitoring  Continuous x 24 Hours (Telem)        Expiring   References:    Telemetry Guidelines   Question:  Reason for 24 Hour Telemetry  Answer:  Metabolic/Electrolyte Disturbance with High Probability of Dysrhythmia (K level <3 or >6, or KCL infusion >=10mEq/hr)                 Telemetry Reviewed: Normal Sinus Rhythm  Indication for Continued Telemetry Use: Metabolic/electrolyte disturbance with high probability of dysrhythmia             Imaging: Reviewed radiology reports from this admission including: xray(s)    Recent Cultures (last 7 days):   Results from last 7 days   Lab Units 01/14/23  0646 01/14/23  0606   BLOOD CULTURE  No Growth at 48 hrs  No Growth at 48 hrs         Last 24 Hours Medication List:   Current Facility-Administered Medications   Medication Dose Route Frequency Provider Last Rate   • acetaminophen  650 mg Oral Q6H PRN Khushboo Hernandez MD     • amLODIPine  5 mg Oral Daily Khushboo Hernandez MD     • cefTRIAXone  1,000 mg Intravenous Q24H Khushboo Hernandez MD 1,000 mg (01/16/23 1772)   • diphenhydrAMINE  25 mg Oral HS PRN Khushboo Hernandez MD     • enoxaparin  30 mg Subcutaneous Daily Khushboo Hernandez MD     • famotidine  20 mg Intravenous HS Khushboo Hernandez MD     • levothyroxine  75 mcg Oral Once per day on Sun Tue Thu Fri Sat Khushboo Hernandez MD     • meclizine  25 mg Oral [facility] Khushboo Hernandez MD     • metoclopramide  5 mg Intravenous Q6H Laury Okeefe MD     • metroNIDAZOLE  500 mg Intravenous Q8H Khushboo Hernandez  mg (01/17/23 0239)   • multi-electrolyte  75 mL/hr Intravenous Continuous Khushboo Hernandez MD 75 mL/hr (01/16/23 8527)   • nebivolol  5 mg Oral QAM Khushboo Hernandez MD     • ondansetron  4 mg Intravenous Q6H PRN Khushboo Hernandez MD     • pantoprazole  40 mg Intravenous Q24H [address] Khushboo Hernandez MD     • pravastatin  20 mg Oral Daily With Briana Lacy MD          Today, Patient Was Seen By: Jaxson Rose    **Please Note: This note may have been constructed using a voice recognition system  **

## 2023-01-17 NOTE — PROGRESS NOTES
Progress Note - Nallely Laird 80 y o  female MRN: [id number]    Unit/Bed#: [address] [place] [address] A Encounter: [id number]        Assessment/Plan:  Nausea vomiting and diarrhea  Presented with intractable nausea vomiting and multiple episodes of loose bowel movements   Patient reports history of severe lactose intolerance and previous bouts of intractable nausea vomiting requiring hospitalization  Remains afebrile, mild leukocytosis on admission which has resolved  CT abdomen without any evidence of obstruction or colitis but had shown acute diverticulitis  • Pt started on clears  • IV fluids  • IV PPI, Pepcid  • IV antiemetics, Zofran ordered PRN, Reglan ordered every 6 hrs scheduled  • Ordered KUB today due to abdominal distention which showed slightly prominent loops of air-filled small bowel are seen throughout the abdomen, possibly representing a mild ileus  • Patient is tolerating clear liquid diet and will advance to low residue lactose restricted     * Acute diverticulitis   Presented with 1 day history of abdominal pain, nausea vomiting diarrhea  CT revealed evidence of acute diverticulitis involving sigmoid colon without any complication  • IV ceftriaxone, metronidazole        Gastroesophageal reflux disease without esophagitis  -Continue PPI iV, IV Pepcid nightly, improving       Subjective:   Patient is lying in bed  Reports that she has not moved her bowels since she was at home a few days ago  Denies any significant abdominal pain nausea or vomiting      Objective:     Vitals: /62   Pulse 74   Temp 98 5 °F (36 9 °C)   Resp 18   Ht 4' 11" (1 499 m)   Wt 60 3 kg (133 lb)   SpO2 96%   BMI 26 86 kg/m²       Physical Exam:  Gen-alert no acute distress  Abd-soft positive bowel sounds, mild distention and tympany, mildly improved from yesterday, no rebound rigidity guarding       Lab, Imaging and other studies:   Recent Results (from the past 72 hour(s))   FLU/RSV/COVID - if FLU/RSV clinically relevant Collection Time: 01/14/23 10:22 AM    Specimen: Nose; Nares   Result Value Ref Range    SARS-CoV-2 Negative Negative    INFLUENZA A PCR Negative Negative    INFLUENZA B PCR Negative Negative    RSV PCR Negative Negative   UA w Reflex to Microscopic w Reflex to Culture    Collection Time: 01/15/23  4:00 AM    Specimen: Urine, Clean Catch   Result Value Ref Range    Color, UA Yellow     Clarity, UA Clear     Specific [place], UA 1 015 1 000 - 1 030    pH, UA 6 0 5 0, 5 5, 6 0, 6 5, 7 0, 7 5, 8 0, 8 5, 9 0    Leukocytes, UA Negative Negative    Nitrite, UA Negative Negative    Protein, UA Trace (A) Negative mg/dl    Glucose, UA Negative Negative mg/dl    Ketones, UA Negative Negative mg/dl    Urobilinogen, UA 0 2 0 2, 1 0 E U /dl E U /dl    Bilirubin, UA Negative Negative    Occult Blood, UA Trace-Intact (A) Negative   Urine Microscopic    Collection Time: 01/15/23  4:00 AM   Result Value Ref Range    RBC, UA 0-1 None Seen, 0-1, 1-2, 2-4, 0-5 /hpf    WBC, UA 0-1 None Seen, 0-1, 1-2, 0-5, 2-4 /hpf    Epithelial Cells Occasional None Seen, Occasional /hpf    Bacteria, UA Moderate (A) None Seen, Occasional /hpf    MUCUS THREADS Occasional (A) None Seen   CBC    Collection Time: 01/15/23  6:07 AM   Result Value Ref Range    WBC 7 30 4 31 - 10 16 Thousand/uL    RBC 3 40 (L) 3 81 - 5 12 Million/uL    Hemoglobin 10 7 (L) 11 5 - 15 4 g/dL    Hematocrit 32 1 (L) 34 8 - 46 1 %    MCV 94 82 - 98 fL    MCH 31 5 26 8 - 34 3 pg    MCHC 33 3 31 4 - 37 4 g/dL    RDW 13 8 11 6 - 15 1 %    Platelets 745 074 - 540 Thousands/uL    MPV 11 9 8 9 - 12 7 fL   Basic metabolic panel    Collection Time: 01/15/23  6:07 AM   Result Value Ref Range    Sodium 142 135 - 147 mmol/L    Potassium 3 4 (L) 3 5 - 5 3 mmol/L    Chloride 102 96 - 108 mmol/L    CO2 32 21 - 32 mmol/L    ANION GAP 8 4 - 13 mmol/L    BUN 14 5 - 25 mg/dL    Creatinine 1 01 0 60 - 1 30 mg/dL    Glucose 98 65 - 140 mg/dL    Calcium 8 7 8 3 - 10 1 mg/dL    eGFR 47 ml/min/1 73sq m Magnesium    Collection Time: 01/15/23  6:07 AM   Result Value Ref Range    Magnesium 1 7 1 6 - 2 6 mg/dL   Phosphorus    Collection Time: 01/15/23  6:07 AM   Result Value Ref Range    Phosphorus 3 2 2 3 - 4 1 mg/dL   Hepatic function panel    Collection Time: 01/15/23  6:07 AM   Result Value Ref Range    Total Bilirubin 0 70 0 20 - 1 00 mg/dL    Bilirubin, Direct 0 19 0 00 - 0 20 mg/dL    Alkaline Phosphatase 40 (L) 46 - 116 U/L    AST 18 5 - 45 U/L    ALT 14 12 - 78 U/L    Total Protein 5 8 (L) 6 4 - 8 4 g/dL    Albumin 2 6 (L) 3 5 - 5 0 g/dL   CBC    Collection Time: 01/16/23  9:02 AM   Result Value Ref Range    WBC 8 38 4 31 - 10 16 Thousand/uL    RBC 3 93 3 81 - 5 12 Million/uL    Hemoglobin 12 3 11 5 - 15 4 g/dL    Hematocrit 36 5 34 8 - 46 1 %    MCV 93 82 - 98 fL    MCH 31 3 26 8 - 34 3 pg    MCHC 33 7 31 4 - 37 4 g/dL    RDW 13 8 11 6 - 15 1 %    Platelets 104 703 - 045 Thousands/uL    MPV 11 6 8 9 - 12 7 fL   Basic metabolic panel    Collection Time: 01/16/23  9:02 AM   Result Value Ref Range    Sodium 136 135 - 147 mmol/L    Potassium 2 9 (L) 3 5 - 5 3 mmol/L    Chloride 98 96 - 108 mmol/L    CO2 28 21 - 32 mmol/L    ANION GAP 10 4 - 13 mmol/L    BUN 15 5 - 25 mg/dL    Creatinine 0 69 0 60 - 1 30 mg/dL    Glucose 125 65 - 140 mg/dL    Calcium 9 0 8 3 - 10 1 mg/dL    eGFR 74 ml/min/1 73sq m   Magnesium    Collection Time: 01/16/23  9:02 AM   Result Value Ref Range    Magnesium 1 8 1 6 - 2 6 mg/dL   COVID/FLU/RSV    Collection Time: 01/16/23  9:58 AM    Specimen: Nose; Nares   Result Value Ref Range    SARS-CoV-2 Negative Negative    INFLUENZA A PCR Negative Negative    INFLUENZA B PCR Negative Negative    RSV PCR Negative Negative   CBC    Collection Time: 01/17/23  6:20 AM   Result Value Ref Range    WBC 8 87 4 31 - 10 16 Thousand/uL    RBC 4 38 3 81 - 5 12 Million/uL    Hemoglobin 13 8 11 5 - 15 4 g/dL    Hematocrit 40 3 34 8 - 46 1 %    MCV 92 82 - 98 fL    MCH 31 5 26 8 - 34 3 pg    MCHC 34 2 31 4 - 37 4 g/dL    RDW 13 5 11 6 - 15 1 %    Platelets 179 699 - 757 Thousands/uL    MPV 10 9 8 9 - 12 7 fL

## 2023-01-17 NOTE — CASE MANAGEMENT
Case Management Discharge Planning Note    Patient name Wendy Dave  Location 2 [facility] [address] A MRN [id number]  : 1927 Date 2023       Current Admission Date: 2023  Current Admission Diagnosis:Acute diverticulitis   Patient Active Problem List    Diagnosis Date Noted   • Anemia 01/15/2023   • Acute diverticulitis 2023   • Nausea vomiting and diarrhea 2023   • Age-related osteoporosis without current pathological fracture 2022   • Primary hypertension 10/26/2022   • Dyslipidemia 10/26/2022   • Acquired hypothyroidism 10/26/2022   • Gastroesophageal reflux disease without esophagitis 10/26/2022   • Osteoarthritis of multiple joints 10/26/2022      LOS (days): 3  Geometric Mean LOS (GMLOS) (days): 2 60  Days to GMLOS:-0 6     OBJECTIVE:  Risk of Unplanned Readmission Score: 11 01         Current admission status: Inpatient   Preferred Pharmacy:   [id number] [address] [facility]  [address] [facility],  [address] [address]  Phone: [phone] Fax: [phone]    [address] [facility], [address] [place] [address]  Phone: [phone] Fax: [phone]    Primary Care Provider: April Fitzgerald MD    Primary Insurance: MEDICARE  Secondary Insurance: BLUE CROSS    DISCHARGE DETAILS:  Pt is accepted for homecare with [facility]  Spoke with pt's daughter informing of this and is in agreement with agency  Reserved agency in [place]  Daughter aware of homecare homebound criteria for homecare as pt was driving prior to admission  Daughter stated pt will not be driving when she goes home due to her current weakness and will be primarily home when discharged                                 Requested [facility] requested[de-identified] Nursing, Occupational Therapy, Physical R [address] Agency Name[de-identified] Other ([facility])  [address] Provider[de-identified] PCP  Home Health Services Needed[de-identified] Evaluate Functional Status and Safety, Gait/ADL Training, Strengthening/Theraputic Exercises to Improve Function  Homebound Criteria Met[de-identified] Requires the Assistance of Another Person for Safe Ambulation or to Leave the Home  Supporting Clincal Findings[de-identified] Limited Endurance, Fatigues Easliy in [employer]

## 2023-01-18 RX ORDER — METOCLOPRAMIDE HYDROCHLORIDE 5 MG/ML
5 INJECTION INTRAMUSCULAR; INTRAVENOUS EVERY 6 HOURS PRN
Status: DISCONTINUED | OUTPATIENT
Start: 2023-01-18 | End: 2023-01-20 | Stop reason: HOSPADM

## 2023-01-18 RX ORDER — METOCLOPRAMIDE HYDROCHLORIDE 5 MG/ML
5 INJECTION INTRAMUSCULAR; INTRAVENOUS EVERY 6 HOURS PRN
Status: DISCONTINUED | OUTPATIENT
Start: 2023-01-18 | End: 2023-01-18

## 2023-01-18 RX ADMIN — METRONIDAZOLE 500 MG: 500 INJECTION, SOLUTION INTRAVENOUS at 09:34

## 2023-01-18 RX ADMIN — DIPHENHYDRAMINE HYDROCHLORIDE 25 MG: 25 TABLET ORAL at 22:21

## 2023-01-18 RX ADMIN — METRONIDAZOLE 500 MG: 500 INJECTION, SOLUTION INTRAVENOUS at 18:35

## 2023-01-18 RX ADMIN — FAMOTIDINE 20 MG: 10 INJECTION, SOLUTION INTRAVENOUS at 21:10

## 2023-01-18 RX ADMIN — MECLIZINE HYDROCHLORIDE 25 MG: 25 TABLET ORAL at 16:08

## 2023-01-18 RX ADMIN — ENOXAPARIN SODIUM 30 MG: 30 INJECTION SUBCUTANEOUS at 09:33

## 2023-01-18 RX ADMIN — CEFTRIAXONE 1000 MG: 1 INJECTION, SOLUTION INTRAVENOUS at 12:49

## 2023-01-18 RX ADMIN — PANTOPRAZOLE SODIUM 40 MG: 40 INJECTION, POWDER, FOR SOLUTION INTRAVENOUS at 09:34

## 2023-01-18 RX ADMIN — MECLIZINE HYDROCHLORIDE 25 MG: 25 TABLET ORAL at 21:10

## 2023-01-18 RX ADMIN — METRONIDAZOLE 500 MG: 500 INJECTION, SOLUTION INTRAVENOUS at 01:30

## 2023-01-18 RX ADMIN — METOCLOPRAMIDE 5 MG: 5 INJECTION, SOLUTION INTRAMUSCULAR; INTRAVENOUS at 05:40

## 2023-01-18 RX ADMIN — PRAVASTATIN SODIUM 20 MG: 20 TABLET ORAL at 16:08

## 2023-01-18 RX ADMIN — MECLIZINE HYDROCHLORIDE 25 MG: 25 TABLET ORAL at 05:40

## 2023-01-18 RX ADMIN — AMLODIPINE BESYLATE 5 MG: 5 TABLET ORAL at 09:34

## 2023-01-18 RX ADMIN — SODIUM CHLORIDE, SODIUM GLUCONATE, SODIUM ACETATE, POTASSIUM CHLORIDE, MAGNESIUM CHLORIDE, SODIUM PHOSPHATE, DIBASIC, AND POTASSIUM PHOSPHATE 75 ML/HR: .53; .5; .37; .037; .03; .012; .00082 INJECTION, SOLUTION INTRAVENOUS at 18:36

## 2023-01-18 RX ADMIN — SODIUM CHLORIDE, SODIUM GLUCONATE, SODIUM ACETATE, POTASSIUM CHLORIDE, MAGNESIUM CHLORIDE, SODIUM PHOSPHATE, DIBASIC, AND POTASSIUM PHOSPHATE 75 ML/HR: .53; .5; .37; .037; .03; .012; .00082 INJECTION, SOLUTION INTRAVENOUS at 03:39

## 2023-01-18 RX ADMIN — NEBIVOLOL 5 MG: 10 TABLET ORAL at 09:34

## 2023-01-18 NOTE — PROGRESS NOTES
[facility] Internal Medicine Progress Note  Patient: Angelita Valdez 80 y o  female   MRN: [id number]  PCP: Geovanna Zayas MD  Unit/Bed#: [address] [place] [address] Encounter: [id number]  Date Of Visit: 01/17/23    Problem List:    Principal Problem:    Acute diverticulitis  Active Problems:    Primary hypertension    Dyslipidemia    Acquired hypothyroidism    Gastroesophageal reflux disease without esophagitis    Osteoarthritis of multiple joints    Nausea vomiting and diarrhea    Anemia      Assessment & Plan:    Anemia  Assessment & Plan  Likely dilutional,  Monitor    Osteoarthritis of multiple joints  Assessment & Plan  PT/OT    Gastroesophageal reflux disease without esophagitis  Assessment & Plan  Continue PPI, changed to IV  IV Pepcid nightly    Acquired hypothyroidism  Assessment & Plan  Continue Synthroid 75 mcg    Dyslipidemia  Assessment & Plan  On statin    Primary hypertension  Assessment & Plan  Elevated on presentation, overall appears stable at present  · Resume home meds, amlodipine, Bystolic with holding parameters  · Holding hydrochlorothiazide    * Acute diverticulitis  Assessment & Plan  Unresolved,   Clinically,      Presented with 1 day history of abdominal pain, nausea vomiting diarrhea  CT revealed evidence of acute diverticulitis involving sigmoid colon without any complication  Mild leukocytosis on presentation, now improved  Abdomen exam remains benign but appears distended  · Continue IV ceftriaxone, metronidazole  · Abdominal exam  · Clear liquid diet as tolerated  · Symptomatic treatment  · GI evaluation-following      Nausea vomiting and diarrhea-resolved as of 1/17/2023  Assessment & Plan  Resolved,     Presented with intractable nausea vomiting and multiple episodes of loose bowel movements since yesterday evening  Patient reports history of severe lactose intolerance and previous bouts of intractable nausea vomiting requiring hospitalization  Afebrile, mild leukocytosis    CT abdomen without any evidence of obstruction or colitis  Suspect gastroenteritis versus precipitation secondary to acute diverticulitis   Slowly improving   · Continue around-the-clock Reglan,  · IV fluids  · IV PPI, Pepcid  · Clear liquid diet, will attempt to advance as tolerated  · IV antiemetics, standing Reglan with Zofran as needed  · Stool studies if diarrhea  · GI [id number] recommendations  · Monitor abdominal exam  · Monitor symptoms    Hypokalemia- persistent in setting of decreased p o  intake  Replete potassium chloride IV  IV magnesium for borderline hypomagnesemia  Telemetry      VTE Pharmacologic Prophylaxis: VTE Score: 5 Moderate Risk (Score 3-4) - Pharmacological DVT Prophylaxis Ordered: enoxaparin (Lovenox)  Patient Centered Rounds: I performed bedside rounds with nursing staff today  Discussions with Specialists or Other Care Team Provider: Yes, GI    Education and Discussions with Family / Patient: Updated  (daughter) at bedside  Yesterday    Time Spent for Care: 45 minutes  More than 50% of total time spent on counseling and coordination of care as described above  Current Length of Stay: 3 day(s)  Current Patient Status: Inpatient   Certification Statement: The patient will continue to require additional inpatient hospital stay due to Diverticulitis, nausea vomiting  Discharge Plan: Anticipate discharge in 48-72 hrs to discharge location to be determined pending rehab evaluations  Code Status: Level 1 - Full Code    Subjective:     Patient seen and examined at bedside, reported no nausea, vomiting, diarrhea, or rectal bleeding, reported slight abdominal pain  Spoke with patient's daughter who stated that patient had a similar episode 7 years ago and patient remained in hospital 7 days until it was resolved  Patient's daughter concerned that patient lives alone, but is open to VNA services and home physical therapy       Objective:     Vitals:   Temp (24hrs), Av 1 °F (36 7 °C), Min:97 6 °F (36 4 °C), Max:98 7 °F (37 1 °C)    Temp:  [97 6 °F (36 4 °C)-98 7 °F (37 1 °C)] 97 7 °F (36 5 °C)  HR:  [67-74] 67  Resp:  [16-18] 16  BP: (149-157)/(53-63) 155/63  SpO2:  [95 %-96 %] 95 % on room air  Body mass index is 26 86 kg/m²  Input and Output Summary (last 24 hours): Intake/Output Summary (Last 24 hours) at 1/17/2023 1925  Last data filed at 1/17/2023 1700  Gross per 24 hour   Intake --   Output 700 ml   Net -700 ml       Physical Exam:   Physical Exam  Vitals reviewed  Constitutional:       General: She is not in acute distress  Appearance: Normal appearance  HENT:      Head: Atraumatic  Nose: No congestion  Eyes:      General: No scleral icterus  Pupils: Pupils are equal, round, and reactive to light  Cardiovascular:      Rate and Rhythm: Normal rate  Heart sounds: No murmur heard  Pulmonary:      Effort: No respiratory distress  Breath sounds: No wheezing, rhonchi or rales  Abdominal:      General: There is no distension  Palpations: Abdomen is soft  Tenderness: There is abdominal tenderness  There is no guarding  Musculoskeletal:         General: No swelling or deformity  Normal range of motion  Cervical back: No tenderness  Right lower leg: No edema  Left lower leg: No edema  Feet:      Right foot:      Skin integrity: No callus  Skin:     General: Skin is warm  Capillary Refill: Capillary refill takes more than 3 seconds  Findings: No lesion or rash  Neurological:      Mental Status: She is oriented to person, place, and time  Cranial Nerves: No cranial nerve deficit        Coordination: Coordination normal    Psychiatric:         Mood and Affect: Mood normal          Behavior: Behavior normal          Additional Data:     Labs:  Results from last 7 days   Lab Units 01/17/23  0620 01/15/23  0607 01/14/23  0606   WBC Thousand/uL 8 87   < > 10 47*   HEMOGLOBIN g/dL 13 8   < > 13 1   HEMATOCRIT % 40 3   < > 39 3   PLATELETS Thousands/uL 248   < > 175   NEUTROS PCT %  --   --  84*   LYMPHS PCT %  --   --  10*   MONOS PCT %  --   --  4   EOS PCT %  --   --  0    < > = values in this interval not displayed  Results from last 7 days   Lab Units 01/17/23  0620 01/16/23  0902 01/15/23  0607   SODIUM mmol/L 136   < > 142   POTASSIUM mmol/L 3 7   < > 3 4*   CHLORIDE mmol/L 98   < > 102   CO2 mmol/L 26   < > 32   BUN mg/dL 14   < > 14   CREATININE mg/dL 0 67   < > 1 01   ANION GAP mmol/L 12   < > 8   CALCIUM mg/dL 9 0   < > 8 7   ALBUMIN g/dL  --   --  2 6*   TOTAL BILIRUBIN mg/dL  --   --  0 70   ALK PHOS U/L  --   --  40*   ALT U/L  --   --  14   AST U/L  --   --  18   GLUCOSE RANDOM mg/dL 106   < > 98    < > = values in this interval not displayed  Results from last 7 days   Lab Units 01/14/23  0606   LACTIC ACID mmol/L 2 0       Lines/Drains:  Invasive Devices     Peripheral Intravenous Line  Duration           Peripheral IV 01/15/23 Left Antecubital 2 days          Drain  Duration           External Urinary Catheter 1 day                      Imaging: Reviewed radiology reports from this admission including: chest xray and abdominal/pelvic CT    Recent Cultures (last 7 days):   Results from last 7 days   Lab Units 01/14/23  0646 01/14/23  0606   BLOOD CULTURE  No Growth at 72 hrs  No Growth at 72 hrs         Last 24 Hours Medication List:   Current Facility-Administered Medications   Medication Dose Route Frequency Provider Last Rate   • acetaminophen  650 mg Oral Q6H PRN Khushboo Hernandez MD     • amLODIPine  5 mg Oral Daily Khushboo Hernandez MD     • cefTRIAXone  1,000 mg Intravenous Q24H Khushboo Hernandez MD 1,000 mg (01/17/23 1301)   • diphenhydrAMINE  25 mg Oral HS PRN Khushboo Hernandez MD     • enoxaparin  30 mg Subcutaneous Daily Khushboo Hernandez MD     • famotidine  20 mg Intravenous HS Khushboo Hernadnez MD     • levothyroxine  75 mcg Oral Once per day on Sun Tue Thu [facility] Rachel Schulz MD     • meclizine  25 mg Oral [facility] Corina Martino MD     • metoclopramide  5 mg Intravenous Q6H [address] Corina Martino MD     • metroNIDAZOLE  500 mg Intravenous Q8H Corina Martino  mg (01/17/23 1839)   • multi-electrolyte  75 mL/hr Intravenous Continuous Corina Martino MD 75 mL/hr (01/17/23 1431)   • nebivolol  5 mg Oral QAM Corina Martino MD     • ondansetron  4 mg Intravenous Q6H PRN Corina Martino MD     • pantoprazole  40 mg Intravenous Q24H Debbie Pak MD     • pravastatin  20 mg Oral Daily With Ivan Lu MD          Today, Patient Was Seen By: Naveen Obrien MD    ** Please Note: "This note has been constructed using a voice recognition system  Therefore there may be syntax, spelling, and/or grammatical errors   Please call if you have any questions  "**

## 2023-01-18 NOTE — PLAN OF CARE
Problem: Potential for Falls  Goal: Patient will remain free of falls  Description: INTERVENTIONS:  - Educate patient/family on patient safety including physical limitations  - Instruct patient to call for assistance with activity   - Consult OT/PT to assist with strengthening/mobility   - Keep Call bell within reach  - Keep bed low and locked with side rails adjusted as appropriate  - Keep care items and personal belongings within reach  - Initiate and maintain comfort rounds  - Make Fall Risk Sign visible to staff  - Offer Toileting every 2 Hours, in advance of need  - Initiate/Maintain bed alarm  - Obtain necessary fall risk management equipment:   - Apply yellow socks and bracelet for high fall risk patients  - Consider moving patient to room near nurses station  Outcome: Progressing     Problem: MOBILITY - ADULT  Goal: Maintain or return to baseline ADL function  Description: INTERVENTIONS:  - Educate patient/family on patient safety including physical limitations  - Instruct patient to call for assistance with activity   - Consult OT/PT to assist with strengthening/mobility   - Keep Call bell within reach  - Keep bed low and locked with side rails adjusted as appropriate  - Keep care items and personal belongings within reach  - Initiate and maintain comfort rounds  - Make Fall Risk Sign visible to staff  - Offer Toileting every 2 Hours, in advance of need  - Initiate/Maintain bed alarm  - Obtain necessary fall risk management equipment:   - Apply yellow socks and bracelet for high fall risk patients  - Consider moving patient to room near nurses station  Outcome: Progressing  Goal: Maintains/Returns to pre admission functional level  Description: INTERVENTIONS:  - Perform BMAT or MOVE assessment daily    - Set and communicate daily mobility goal to care team and patient/family/caregiver  - Collaborate with rehabilitation services on mobility goals if consulted  - Perform Range of Motion 3 times a day    - Reposition patient every 2 hours    - Dangle patient 3 times a day  - Stand patient 3 times a day  - Ambulate patient 3 times a day  - Out of bed to chair 3 times a day   - Out of bed for meals 3  Problem: PAIN - ADULT  Goal: Verbalizes/displays adequate comfort level or baseline comfort level  Description: Interventions:  - Encourage patient to monitor pain and request assistance  - Assess pain using appropriate pain scale  - Administer analgesics based on type and severity of pain and evaluate response  - Implement non-pharmacological measures as appropriate and evaluate response  - Consider cultural and social influences on pain and pain management  - Notify physician/advanced practitioner if interventions unsuccessful or patient reports new pain  Outcome: Progressing     Problem: INFECTION - ADULT  Goal: Absence or prevention of progression during hospitalization  Description: INTERVENTIONS:  - Assess and monitor for signs and symptoms of infection  - Monitor lab/diagnostic results  - Monitor all insertion sites, i e  indwelling lines, tubes, and drains  - Monitor endotracheal if appropriate and nasal secretions for changes in amount and color  - [place] [employer] appropriate cooling/warming therapies per order  - Administer medications as ordered  - Instruct and encourage patient and family to use good hand hygiene technique  - Identify and instruct in appropriate isolation precautions for identified infection/condition  Outcome: Progressing  Goal: Absence of fever/infection during neutropenic period  Description: INTERVENTIONS:  - Monitor WBC    Outcome: Progressing     Problem: DISCHARGE PLANNING  Goal: Discharge to home or other facility with appropriate resources  Description: INTERVENTIONS:  - Identify barriers to discharge w/patient and caregiver  - Arrange for needed discharge resources and transportation as appropriate  - Identify discharge learning needs (meds, wound care, etc )  - Arrange for interpretive services to assist at discharge as needed  - Refer to Case Management Department for coordinating discharge planning if the patient needs post-hospital services based on physician/advanced practitioner order or complex needs related to functional status, cognitive ability, or social support system  Outcome: Progressing     Problem: Knowledge Deficit  Goal: Patient/family/caregiver demonstrates understanding of disease process, treatment plan, medications, and discharge instructions  Description: Complete learning assessment and assess knowledge base    Interventions:  - Provide teaching at level of understanding  - Provide teaching via preferred learning methods  Outcome: Progressing     Problem: GASTROINTESTINAL - ADULT  Goal: Minimal or absence of nausea and/or vomiting  Description: INTERVENTIONS:  - Administer IV fluids if ordered to ensure adequate hydration  - Maintain NPO status until nausea and vomiting are resolved  - Nasogastric tube if ordered  - Administer ordered antiemetic medications as needed  - Provide nonpharmacologic comfort measures as appropriate  - Advance diet as tolerated, if ordered  - Consider nutrition services referral to assist patient with adequate nutrition and appropriate food choices  Outcome: Progressing    times a day  - Out of bed for toileting  - Record patient progress and toleration of activity level   Outcome: Progressing

## 2023-01-18 NOTE — PLAN OF CARE
Problem: Potential for Falls  Goal: Patient will remain free of falls  Description: INTERVENTIONS:  - Educate patient/family on patient safety including physical limitations  - Instruct patient to call for assistance with activity   - Consult OT/PT to assist with strengthening/mobility   - Keep Call bell within reach  - Keep bed low and locked with side rails adjusted as appropriate  - Keep care items and personal belongings within reach  - Initiate and maintain comfort rounds  - Make Fall Risk Sign visible to staff  - Offer Toileting every 2 Hours, in advance of need  - Initiate/Maintain bed alarm  - Obtain necessary fall risk management equipment:   - Apply yellow socks and bracelet for high fall risk patients  - Consider moving patient to room near nurses station  Outcome: Progressing     Problem: MOBILITY - ADULT  Goal: Maintain or return to baseline ADL function  Description: INTERVENTIONS:  - Educate patient/family on patient safety including physical limitations  - Instruct patient to call for assistance with activity   - Consult OT/PT to assist with strengthening/mobility   - Keep Call bell within reach  - Keep bed low and locked with side rails adjusted as appropriate  - Keep care items and personal belongings within reach  - Initiate and maintain comfort rounds  - Make Fall Risk Sign visible to staff  - Offer Toileting every 2 Hours, in advance of need  - Initiate/Maintain bed alarm  - Obtain necessary fall risk management equipment:   - Apply yellow socks and bracelet for high fall risk patients  - Consider moving patient to room near nurses station  Outcome: Progressing  Goal: Maintains/Returns to pre admission functional level  Description: INTERVENTIONS:  - Perform BMAT or MOVE assessment daily    - Set and communicate daily mobility goal to care team and patient/family/caregiver  - Collaborate with rehabilitation services on mobility goals if consulted  - Perform Range of Motion 4 times a day    - Reposition patient every 2 hours    - Dangle patient 2 times a day  - Stand patient 2 times a day  - Ambulate patient 3 times a day  - Out of bed to chair 2 times a day   - Out of bed for meals 2 times a day  - Out of bed for toileting  - Record patient progress and toleration of activity level   Outcome: Progressing     Problem: PAIN - ADULT  Goal: Verbalizes/displays adequate comfort level or baseline comfort level  Description: Interventions:  - Encourage patient to monitor pain and request assistance  - Assess pain using appropriate pain scale  - Administer analgesics based on type and severity of pain and evaluate response  - Implement non-pharmacological measures as appropriate and evaluate response  - Consider cultural and social influences on pain and pain management  - Notify physician/advanced practitioner if interventions unsuccessful or patient reports new pain  Outcome: Progressing     Problem: INFECTION - ADULT  Goal: Absence or prevention of progression during hospitalization  Description: INTERVENTIONS:  - Assess and monitor for signs and symptoms of infection  - Monitor lab/diagnostic results  - Monitor all insertion sites, i e  indwelling lines, tubes, and drains  - Monitor endotracheal if appropriate and nasal secretions for changes in amount and color  - [place] appropriate cooling/warming therapies per order  - Administer medications as ordered  - Instruct and encourage patient and family to use good hand hygiene technique  - Identify and instruct in appropriate isolation precautions for identified infection/condition  Outcome: Progressing  Goal: Absence of fever/infection during neutropenic period  Description: INTERVENTIONS:  - Monitor WBC    Outcome: Progressing     Problem: SAFETY ADULT  Goal: Patient will remain free of falls  Description: INTERVENTIONS:  - Educate patient/family on patient safety including physical limitations  - Instruct patient to call for assistance with activity   - Consult OT/PT to assist with strengthening/mobility   - Keep Call bell within reach  - Keep bed low and locked with side rails adjusted as appropriate  - Keep care items and personal belongings within reach  - Initiate and maintain comfort rounds  - Make Fall Risk Sign visible to staff  - Offer Toileting every 2 Hours, in advance of need  - Initiate/Maintain bed alarm  - Obtain necessary fall risk management equipment:   - Apply yellow socks and bracelet for high fall risk patients  - Consider moving patient to room near nurses station  Outcome: Progressing  Goal: Maintain or return to baseline ADL function  Description: INTERVENTIONS:  - Educate patient/family on patient safety including physical limitations  - Instruct patient to call for assistance with activity   - Consult OT/PT to assist with strengthening/mobility   - Keep Call bell within reach  - Keep bed low and locked with side rails adjusted as appropriate  - Keep care items and personal belongings within reach  - Initiate and maintain comfort rounds  - Make Fall Risk Sign visible to staff  - Offer Toileting every 2 Hours, in advance of need  - Initiate/Maintain bed alarm  - Obtain necessary fall risk management equipment:   - Apply yellow socks and bracelet for high fall risk patients  - Consider moving patient to room near nurses station  Outcome: Progressing  Goal: Maintains/Returns to pre admission functional level  Description: INTERVENTIONS:  - Perform BMAT or MOVE assessment daily    - Set and communicate daily mobility goal to care team and patient/family/caregiver  - Collaborate with rehabilitation services on mobility goals if consulted  - Perform Range of Motion 4 times a day  - Reposition patient every 2 hours    - Dangle patient 2 times a day  - Stand patient 2 times a day  - Ambulate patient 3 times a day  - Out of bed to chair 2 times a day   - Out of bed for meals 2 times a day  - Out of bed for toileting  - Record patient progress and toleration of activity level   Outcome: Progressing     Problem: DISCHARGE PLANNING  Goal: Discharge to home or other facility with appropriate resources  Description: INTERVENTIONS:  - Identify barriers to discharge w/patient and caregiver  - Arrange for needed discharge resources and transportation as appropriate  - Identify discharge learning needs (meds, wound care, etc )  - Arrange for interpretive services to assist at discharge as needed  - Refer to Case Management Department for coordinating discharge planning if the patient needs post-hospital services based on physician/advanced practitioner order or complex needs related to functional status, cognitive ability, or social support system  Outcome: Progressing     Problem: Knowledge Deficit  Goal: Patient/family/caregiver demonstrates understanding of disease process, treatment plan, medications, and discharge instructions  Description: Complete learning assessment and assess knowledge base    Interventions:  - Provide teaching at level of understanding  - Provide teaching via preferred learning methods  Outcome: Progressing     Problem: GASTROINTESTINAL - ADULT  Goal: Minimal or absence of nausea and/or vomiting  Description: INTERVENTIONS:  - Administer IV fluids if ordered to ensure adequate hydration  - Maintain NPO status until nausea and vomiting are resolved  - Nasogastric tube if ordered  - Administer ordered antiemetic medications as needed  - Provide nonpharmacologic comfort measures as appropriate  - Advance diet as tolerated, if ordered  - Consider nutrition services referral to assist patient with adequate nutrition and appropriate food choices  Outcome: Progressing     Problem: Prexisting or High Potential for Compromised Skin Integrity  Goal: Skin integrity is maintained or improved  Description: INTERVENTIONS:  - Identify patients at risk for skin breakdown  - Assess and monitor skin integrity  - Assess and monitor nutrition and hydration status  - Monitor labs   - Assess for incontinence   - Turn and reposition patient  - Assist with mobility/ambulation  - Relieve pressure over bony prominences  - Avoid friction and shearing  - Provide appropriate hygiene as needed including keeping skin clean and dry  - Evaluate need for skin moisturizer/barrier cream  - Collaborate with interdisciplinary team   - Patient/family teaching  - Consider wound care consult   Outcome: Progressing     Problem: Nutrition/Hydration-ADULT  Goal: Nutrient/Hydration intake appropriate for improving, restoring or maintaining nutritional needs  Description: Monitor and assess patient's nutrition/hydration status for malnutrition  Collaborate with interdisciplinary team and initiate plan and interventions as ordered  Monitor patient's weight and dietary intake as ordered or per policy  Utilize nutrition screening tool and intervene as necessary  Determine patient's food preferences and provide high-protein, high-caloric foods as appropriate       INTERVENTIONS:  - Monitor oral intake, urinary output, labs, and treatment plans  - Assess nutrition and hydration status and recommend course of action  - Evaluate amount of meals eaten  - Assist patient with eating if necessary   - Allow adequate time for meals  - Recommend/ encourage appropriate diets, oral nutritional supplements, and vitamin/mineral supplements  - Order, calculate, and assess calorie counts as needed  - Recommend, monitor, and adjust tube feedings and TPN/PPN based on assessed needs  - Assess need for intravenous fluids  - Provide specific nutrition/hydration education as appropriate  - Include patient/family/caregiver in decisions related to nutrition  Outcome: Progressing

## 2023-01-18 NOTE — APP STUDENT NOTE
*Acute Diverticulitis   Patient presented to ED with nausea, vomiting, and diarrhea x1 day   CT Scan showed Acute Diverticulitis in the distal sigmoid colon surrounding inflammatory changes  No obstruction, free air, or abscess noted  - Continue patient on IV Rocephin and IV metronidazole   - Tolerating liquid diet well, advance to low residue lactose restricted diet per GI  - Continue zofran PRN nausea   - Continue IV fluids   - Potassium level at 2 9; 1 dose potassium supplementation 1/16; currently stabilized at 3 7 1/17   - Monitor magnesium     - GI input appreciated   - KUB ordered and showed: Slightly prominent loops of air-filled small bowel are seen throughout the abdomen, possibly representing a mild ileus   - Continue IV fluids   - Continue to monitor potassium and magnesium   - Clear liquid diet transitioned to advanced to low residue lactose restricted diet 1/18   - Monitor abdominal pain and symptoms         Nausea, Vomiting and Diarrhea   Patient presented with nausea, vomiting and diarrhea x1 day   History of acute diverticulitis; patient reports history of lactose intolerance  Afebrile with mild leukocytosis   CT as stated above   - IV Zofran PRN nausea   - IV metronidazole and IV rocephin  - IV metoclopramide   - IV PPI, Pepcid   - IV Fluids  - GI evaluation   - Stool studies if diarrhea  - Monitor abdominal pain and symptoms     Hypokalemia in the setting of dehydration; replete as needed     Levels stable at 3 7 1/17     Anemia   Monitor     Gastroesophageal Reflux Disease without esophagitis   Continue PPI, changed to IV   IV pepcid nightly      Acquired Hypothyroidism  Continue Synthroid         Primary Hypertension   Elevated upon admission, stabilizing now   Continue home meds; amlodipine and bystolic   Hold Hydrochlorothiazide     Dyslipidemia  Continue statin     Osteoarthritis of multiple joints   PT/OT       VTE Pharmacologic Prophylaxis: VTE Score: 5 High Risk (Score >/= 5) - Pharmacological DVT Prophylaxis Ordered: enoxaparin (Lovenox)  Sequential Compression Devices Ordered  Patient Centered Rounds: I performed bedside rounds with nursing staff today  Discussions with Specialists or Other Care Team Provider: Gastroenterology    Education and Discussions with Family / Patient: discussed with patient        Time Spent for Care: 30 minutes  More than 50% of total time spent on counseling and coordination of care as described above  Current Length of Stay: 4 day(s)  Current Patient Status: Inpatient   Certification Statement: The patient will continue to require additional inpatient hospital stay due to acute diverticulitis  Discharge Plan: Anticipate discharge tomorrow to home with home services  Code Status: Level 1 - Full Code    Subjective:   Patient states she does not have any abdominal pain  Patient states she is tolerating advanced diet well with no symptoms of nausea or vomiting  Patient states she experienced mild heartburn last night but contributes it to drinking tea during hospital stay  Patient denies any fever, chills, chest pain, shortness of breath, mild swelling in legs  Patient states she is urinating well but has not had bowel movement  Objective:     Vitals:   Temp (24hrs), Av 1 °F (36 7 °C), Min:97 7 °F (36 5 °C), Max:98 6 °F (37 °C)    Temp:  [97 7 °F (36 5 °C)-98 6 °F (37 °C)] 98 °F (36 7 °C)  HR:  [67-78] 76  Resp:  [16-20] 16  BP: (138-155)/(60-64) 138/60  SpO2:  [94 %-96 %] 94 %  Body mass index is 26 86 kg/m²  Input and Output Summary (last 24 hours): Intake/Output Summary (Last 24 hours) at 2023 1058  Last data filed at 2023 1700  Gross per 24 hour   Intake --   Output 350 ml   Net -350 ml       Physical Exam:   Physical Exam  Vitals and nursing note reviewed  Constitutional:       General: She is not in acute distress  Appearance: She is well-developed  HENT:      Head: Normocephalic and atraumatic     Eyes: Conjunctiva/sclera: Conjunctivae normal    Cardiovascular:      Rate and Rhythm: Normal rate and regular rhythm  Heart sounds: No murmur heard  Pulmonary:      Effort: Pulmonary effort is normal  No respiratory distress  Breath sounds: Normal breath sounds  Abdominal:      General: There is distension  Palpations: Abdomen is soft  There is no hepatomegaly, splenomegaly or mass  Tenderness: There is no abdominal tenderness  Musculoskeletal:         General: No swelling  Cervical back: Neck supple  Right lower leg: Edema present  Left lower leg: Edema present  Skin:     General: Skin is warm and dry  Capillary Refill: Capillary refill takes less than 2 seconds  Neurological:      Mental Status: She is alert  Psychiatric:         Mood and Affect: Mood normal           Additional Data:     Labs:  Results from last 7 days   Lab Units 01/17/23  0620 01/15/23  0607 01/14/23  0606   WBC Thousand/uL 8 87   < > 10 47*   HEMOGLOBIN g/dL 13 8   < > 13 1   HEMATOCRIT % 40 3   < > 39 3   PLATELETS Thousands/uL 248   < > 175   NEUTROS PCT %  --   --  84*   LYMPHS PCT %  --   --  10*   MONOS PCT %  --   --  4   EOS PCT %  --   --  0    < > = values in this interval not displayed  Results from last 7 days   Lab Units 01/17/23  0620 01/16/23  0902 01/15/23  0607   SODIUM mmol/L 136   < > 142   POTASSIUM mmol/L 3 7   < > 3 4*   CHLORIDE mmol/L 98   < > 102   CO2 mmol/L 26   < > 32   BUN mg/dL 14   < > 14   CREATININE mg/dL 0 67   < > 1 01   ANION GAP mmol/L 12   < > 8   CALCIUM mg/dL 9 0   < > 8 7   ALBUMIN g/dL  --   --  2 6*   TOTAL BILIRUBIN mg/dL  --   --  0 70   ALK PHOS U/L  --   --  40*   ALT U/L  --   --  14   AST U/L  --   --  18   GLUCOSE RANDOM mg/dL 106   < > 98    < > = values in this interval not displayed                   Results from last 7 days   Lab Units 01/14/23  0606   LACTIC ACID mmol/L 2 0       Lines/Drains:  Invasive Devices     Peripheral Intravenous Line Duration           Peripheral IV 01/15/23 Left Antecubital 2 days          Drain  Duration           External Urinary Catheter 2 days                  Telemetry:  Telemetry Orders (From admission, onward)             24 Hour Telemetry Monitoring  Continuous x 24 Hours (Telem)           References:    Telemetry Guidelines   Question:  Reason for 24 Hour Telemetry  Answer:  Metabolic/Electrolyte Disturbance with High Probability of Dysrhythmia (K level <3 or >6, or KCL infusion >=10mEq/hr)                 Telemetry Reviewed: Normal Sinus Rhythm  Indication for Continued Telemetry Use: Metabolic/electrolyte disturbance with high probability of dysrhythmia             Imaging: No pertinent imaging reviewed  Recent Cultures (last 7 days):   Results from last 7 days   Lab Units 23  0646 23  0606   BLOOD CULTURE  No Growth at 72 hrs  No Growth at 72 hrs         Last 24 Hours Medication List:   Current Facility-Administered Medications   Medication Dose Route Frequency Provider Last Rate   • acetaminophen  650 mg Oral Q6H PRN Aneta Day MD     • amLODIPine  5 mg Oral Daily Aneta Day MD     • cefTRIAXone  1,000 mg Intravenous Q24H Aneta Day MD 1,000 mg (23 1301)   • diphenhydrAMINE  25 mg Oral HS PRN Aneta Day MD     • enoxaparin  30 mg Subcutaneous Daily Aneta Day MD     • famotidine  20 mg Intravenous HS Aneta Day MD     • levothyroxine  75 mcg Oral Once per day on Sun Tue Thu Fri Sat Aneta Day MD     • meclizine  25 mg Oral [facility] Aneta Day MD     • metoclopramide  5 mg Intravenous Q6H Ki Jones MD     • metroNIDAZOLE  500 mg Intravenous Q8H Aneta Day  mg (23 9945)   • multi-electrolyte  75 mL/hr Intravenous Continuous Aneta Day MD 75 mL/hr (23 5030)   • nebivolol  5 mg Oral QARONAL Day MD     • ondansetron  4 mg Intravenous Q6H PRN Aneta Day MD     • pantoprazole  40 mg Intravenous Q24H Gauri Perales MD     • pravastatin  20 mg Oral Daily With Ju You MD          Today, Patient Was Seen By: Ignacia Oviedo    **Please Note: This note may have been constructed using a voice recognition system  **

## 2023-01-18 NOTE — PROGRESS NOTES
[facility] Progress Note  Patient: Lydia Truong 80 y o  female   MRN: [id number]  PCP: Waqas Conley MD  Unit/Bed#: [address] [place] [address] A Encounter: [id number]  Date Of Visit: 01/18/23    Problem List:    Principal Problem:    Acute diverticulitis  Active Problems:    Primary hypertension    Dyslipidemia    Acquired hypothyroidism    Gastroesophageal reflux disease without esophagitis    Osteoarthritis of multiple joints    Anemia      Assessment & Plan:    * Acute diverticulitis  Assessment & Plan  Improving, Clinically,      Presented with 1 day history of abdominal pain, nausea vomiting diarrhea  CT revealed evidence of acute diverticulitis involving sigmoid colon without any complication  Mild leukocytosis on presentation, now improved  Abdomen exam remains benign but appears distended  · Continue IV ceftriaxone, metronidazole  · Abdominal exam  · Clear liquid diet as tolerated  · Symptomatic treatment  • GI recommendations:  • Pt started on clears  • IV fluids  • IV PPI, Pepcid  • IV antiemetics, Zofran ordered PRN, Reglan ordered every 6 hrs scheduled, transitioned to PRN   • Ordered KUB due to abdominal distention which showed slightly prominent loops of air-filled small bowel are seen throughout the abdomen, possibly representing a mild ileus  Patient was tolerating clear liquid diet and advanced to low residue lactose restricted    Anemia  Assessment & Plan  Likely dilutional,  Monitor    Osteoarthritis of multiple joints  Assessment & Plan  · PT/OT: Post acute rehabilitation services    Gastroesophageal reflux disease without esophagitis  Assessment & Plan  Continue PPI, changed to IV  IV Pepcid nightly    Acquired hypothyroidism  Assessment & Plan  Continue Synthroid 75 mcg    Dyslipidemia  Assessment & Plan  On statin    Primary hypertension  Assessment & Plan  Elevated on presentation, overall appears stable at present  · Resume home meds, amlodipine, Bystolic with holding parameters  · Holding hydrochlorothiazide    Nausea vomiting and diarrhea-resolved as of 1/17/2023  Assessment & Plan  Resolved,     Presented with intractable nausea vomiting and multiple episodes of loose bowel movements since yesterday evening  Patient reports history of severe lactose intolerance and previous bouts of intractable nausea vomiting requiring hospitalization  Afebrile, mild leukocytosis  CT abdomen without any evidence of obstruction or colitis  Suspect gastroenteritis versus precipitation secondary to acute diverticulitis   Slowly improving   · Continue around-the-clock Reglan,  · IV fluids  · IV PPI, Pepcid  · Clear liquid diet, will attempt to advance as tolerated  · IV antiemetics, standing Reglan with Zofran as needed  · Stool studies if diarrhea  · GI [id number] recommendations  · Monitor abdominal exam  · Monitor symptoms        VTE Pharmacologic Prophylaxis: VTE Score: 5 Moderate Risk (Score 3-4) - Pharmacological DVT Prophylaxis Ordered: enoxaparin (Lovenox)  Patient Centered Rounds: I performed bedside rounds with nursing staff today  Discussions with Specialists or Other Care Team Provider: Yes, GI    Education and Discussions with Family / Patient: Updated  (daughter) at bedside  Yesterday    Time Spent for Care: 45 minutes  More than 50% of total time spent on counseling and coordination of care as described above  Current Length of Stay: 4 day(s)  Current Patient Status: Inpatient   Certification Statement: The patient will continue to require additional inpatient hospital stay due to Clinical course  Discharge Plan: Anticipate discharge in 48-72 hrs to discharge location to be determined pending rehab evaluations  Code Status: Level 1 - Full Code    Subjective:     Patient seen and examined at bedside with son and daughter present  Patient denied any headache, blurry vision, chest pain, shortness of breath, abdominal pain    Patient denied any gas or bowel movement at this time  Patient has limited oral intake  Patient's daughter concerned that patient is high risk for falls due to living home alone and no caretaker  Patient's daughter reported they do have the means to stay in her 1 bedroom apartment, will obtain cameras to observe her  Patient family informed of OT recommendations and reevaluation due to patient's deconditioning  Objective:     Vitals:   Temp (24hrs), Av 1 °F (36 7 °C), Min:97 7 °F (36 5 °C), Max:98 6 °F (37 °C)    Temp:  [97 7 °F (36 5 °C)-98 6 °F (37 °C)] 98 °F (36 7 °C)  HR:  [67-78] 76  Resp:  [16-20] 16  BP: (138-155)/(60-64) 138/60  SpO2:  [94 %-96 %] 94 % on room air  Body mass index is 26 86 kg/m²  Input and Output Summary (last 24 hours): Intake/Output Summary (Last 24 hours) at 2023 0918  Last data filed at 2023 1700  Gross per 24 hour   Intake --   Output 350 ml   Net -350 ml       Physical Exam:   Physical Exam  Vitals reviewed  Constitutional:       General: She is not in acute distress  Appearance: Normal appearance  HENT:      Head: Atraumatic  Nose: No congestion  Eyes:      General: No scleral icterus  Pupils: Pupils are equal, round, and reactive to light  Cardiovascular:      Rate and Rhythm: Normal rate  Heart sounds: No murmur heard  Pulmonary:      Effort: No respiratory distress  Breath sounds: No wheezing, rhonchi or rales  Abdominal:      General: There is distension  Palpations: Abdomen is soft  Tenderness: There is no abdominal tenderness  There is no guarding  Musculoskeletal:         General: No swelling or deformity  Normal range of motion  Cervical back: No tenderness  Right lower leg: No edema  Left lower leg: No edema  Feet:      Right foot:      Skin integrity: No callus  Skin:     General: Skin is warm  Capillary Refill: Capillary refill takes more than 3 seconds  Findings: No lesion or rash  Neurological:      Mental Status: She is oriented to person, place, and time  Cranial Nerves: No cranial nerve deficit  Coordination: Coordination normal    Psychiatric:         Mood and Affect: Mood normal          Thought Content: Thought content normal          Additional Data:     Labs:  Results from last 7 days   Lab Units 01/17/23  0620 01/15/23  0607 01/14/23  0606   WBC Thousand/uL 8 87   < > 10 47*   HEMOGLOBIN g/dL 13 8   < > 13 1   HEMATOCRIT % 40 3   < > 39 3   PLATELETS Thousands/uL 248   < > 175   NEUTROS PCT %  --   --  84*   LYMPHS PCT %  --   --  10*   MONOS PCT %  --   --  4   EOS PCT %  --   --  0    < > = values in this interval not displayed  Results from last 7 days   Lab Units 01/17/23  0620 01/16/23  0902 01/15/23  0607   SODIUM mmol/L 136   < > 142   POTASSIUM mmol/L 3 7   < > 3 4*   CHLORIDE mmol/L 98   < > 102   CO2 mmol/L 26   < > 32   BUN mg/dL 14   < > 14   CREATININE mg/dL 0 67   < > 1 01   ANION GAP mmol/L 12   < > 8   CALCIUM mg/dL 9 0   < > 8 7   ALBUMIN g/dL  --   --  2 6*   TOTAL BILIRUBIN mg/dL  --   --  0 70   ALK PHOS U/L  --   --  40*   ALT U/L  --   --  14   AST U/L  --   --  18   GLUCOSE RANDOM mg/dL 106   < > 98    < > = values in this interval not displayed  Results from last 7 days   Lab Units 01/14/23  0606   LACTIC ACID mmol/L 2 0       Lines/Drains:  Invasive Devices     Peripheral Intravenous Line  Duration           Peripheral IV 01/15/23 Left Antecubital 2 days          Drain  Duration           External Urinary Catheter 2 days                      Imaging: Reviewed radiology reports from this admission including: chest xray and abdominal/pelvic CT    Recent Cultures (last 7 days):   Results from last 7 days   Lab Units 01/14/23  0646 01/14/23  0606   BLOOD CULTURE  No Growth at 72 hrs  No Growth at 72 hrs         Last 24 Hours Medication List:   Current Facility-Administered Medications   Medication Dose Route Frequency Provider Last Rate   • acetaminophen  650 mg Oral Q6H PRN Vicente aJin MD     • amLODIPine  5 mg Oral Daily Vicente Jain MD     • cefTRIAXone  1,000 mg Intravenous Q24H Vicente Jain MD 1,000 mg (01/17/23 1301)   • diphenhydrAMINE  25 mg Oral HS PRN Vicente Jain MD     • enoxaparin  30 mg Subcutaneous Daily Vicente Jain MD     • famotidine  20 mg Intravenous HS Vicente Jain MD     • levothyroxine  75 mcg Oral Once per day on Sun Tue Thu Fri Sat Vicente Jain MD     • meclizine  25 mg Oral [facility] Vicente Jain MD     • metoclopramide  5 mg Intravenous Q6H Gauri Perales MD     • metroNIDAZOLE  500 mg Intravenous Q8H Vicente Jain  mg (01/18/23 0130)   • multi-electrolyte  75 mL/hr Intravenous Continuous Vicente Jain MD 75 mL/hr (01/18/23 0339)   • nebivolol  5 mg Oral QAM Vicente Jain MD     • ondansetron  4 mg Intravenous Q6H PRN Vicente Jain MD     • pantoprazole  40 mg Intravenous Q24H Gauri Perales MD     • pravastatin  20 mg Oral Daily With Ju You MD          Today, Patient Was Seen By: Pramod Riley MD    ** Please Note: "This note has been constructed using a voice recognition system  Therefore there may be syntax, spelling, and/or grammatical errors   Please call if you have any questions  "**

## 2023-01-18 NOTE — PHYSICAL THERAPY NOTE
PT TREATMENT     01/18/23 7330   Note Type   Note Type Treatment   Pain Assessment   Pain Assessment Tool 0-10   Pain Score No Pain   Restrictions/Precautions   Other Precautions Fall Risk   General   Chart Reviewed Yes   Family/Caregiver Present Yes  (daughter)   Cognition   Overall Cognitive Status WFL   Arousal/Participation Alert   Following Commands Follows all commands and directions without difficulty   Subjective   Subjective "I don't think I can live on my own anymore"   Bed Mobility   Supine to Sit 4  Minimal assistance   Transfers   Sit to Stand 4  Minimal assistance   Stand to Sit 4  Minimal assistance   Stand pivot 4  Minimal assistance   Additional items   (with walker)   Toilet transfer 4  Minimal assistance   Additional items Commode   Ambulation/Elevation   Gait pattern   (slow ren)   Gait Assistance 4  Minimal assist   Assistive Device Rolling walker   Distance 2x20 feet   Balance   Static Sitting Fair +   Static Standing Fair -   Activity Tolerance   Activity Tolerance Patient limited by fatigue;Patient tolerated treatment well   Assessment   Prognosis Good   Problem List Decreased strength; Impaired balance;Decreased mobility   Assessment Pt demonstrates improving activity tolerance overall but is generally weak  Pt was living alone and careing for herself independently PTA  Pt now requires min assist for all mobility so recommend STR upon DC  The patient's AM-PAC Basic Mobility Inpatient Short Form Raw Score is 16  A Raw score of less than or equal to 16 suggests the patient may benefit from discharge to post-acute rehabilitation services  Plan   Treatment/Interventions Functional transfer training;LE strengthening/ROM; Therapeutic exercise;Gait training;Bed mobility; Equipment eval/education;Patient/family training; Endurance training;ADL retraining;Elevations; Spoke to MD;Spoke to nursing   Progress Progressing toward goals   PT Frequency Other (Comment)  (5x/w)   Recommendation   PT Discharge Recommendation Post acute rehabilitation services   AM-PAC Basic Mobility Inpatient   Turning in Flat Bed Without Bedrails 3   Lying on Back to Sitting on Edge of Flat Bed Without Bedrails 2   Moving Bed to Chair 3   Standing Up From Chair Using Arms 3   Walk in Room 3   Climb 3-5 Stairs With Railing 2   Basic Mobility Inpatient Raw Score 16   Basic Mobility Standardized Score 38 32   Highest Level Of Mobility   JH-HLM Goal 5: Stand one or more mins   JH-HLM Achieved 7: Walk 25 feet or more   End of Consult   Patient Position at End of Consult All needs within reach;Bed/Chair alarm activated; Bedside chair   Licensure   [place] License Number  [facility]  [id number]

## 2023-01-18 NOTE — PROGRESS NOTES
Progress Note - Uday Valverde 80 y o  female MRN: [id number]    Unit/Bed#: 2 [place] [address] A Encounter: [id number]        Assessment/Plan:  Nausea vomiting and diarrhea  Presented with intractable nausea vomiting and multiple episodes of loose bowel movements   Patient reports history of severe lactose intolerance and previous bouts of intractable nausea vomiting requiring hospitalization  Remains afebrile, mild leukocytosis on admission which has resolved  CT abdomen without any evidence of obstruction or colitis but had shown acute diverticulitis  • Pt started on clears  • IV fluids  • IV PPI, Pepcid  • IV antiemetics, Zofran ordered PRN, Reglan ordered every 6 hrs scheduled, transitioned to PRN   • Ordered KUB due to abdominal distention which showed slightly prominent loops of air-filled small bowel are seen throughout the abdomen, possibly representing a mild ileus  • Patient was tolerating clear liquid diet and advanced to low residue lactose restricted     * Acute diverticulitis   Presented with 1 day history of abdominal pain, nausea vomiting diarrhea  CT revealed evidence of acute diverticulitis involving sigmoid colon without any complication  • IV ceftriaxone, metronidazole        Gastroesophageal reflux disease without esophagitis  -Continue PPI iV, IV Pepcid nightly, improving    Subjective:   Patient is lying in bed  She reports she was able to eat and still has not moved her bowels  She otherwise feels that she may move her bowels today  Denies any significant abdominal pain or nausea or vomiting      Objective:     Vitals: /60   Pulse 76   Temp 98 °F (36 7 °C)   Resp 16   Ht 4' 11" (1 499 m)   Wt 60 3 kg (133 lb)   SpO2 94%   BMI 26 86 kg/m²       Physical Exam:  Gen-alert no acute distress  Abd-bowel sounds remain hypoactive mild left lower quadrant tenderness to palpation, distention unchanged, no rebound rigidity guarding       Lab, Imaging and other studies:   Recent Results (from the past 72 hour(s))   CBC    Collection Time: 01/16/23  9:02 AM   Result Value Ref Range    WBC 8 38 4 31 - 10 16 Thousand/uL    RBC 3 93 3 81 - 5 12 Million/uL    Hemoglobin 12 3 11 5 - 15 4 g/dL    Hematocrit 36 5 34 8 - 46 1 %    MCV 93 82 - 98 fL    MCH 31 3 26 8 - 34 3 pg    MCHC 33 7 31 4 - 37 4 g/dL    RDW 13 8 11 6 - 15 1 %    Platelets 270 195 - 483 Thousands/uL    MPV 11 6 8 9 - 12 7 fL   Basic metabolic panel    Collection Time: 01/16/23  9:02 AM   Result Value Ref Range    Sodium 136 135 - 147 mmol/L    Potassium 2 9 (L) 3 5 - 5 3 mmol/L    Chloride 98 96 - 108 mmol/L    CO2 28 21 - 32 mmol/L    ANION GAP 10 4 - 13 mmol/L    BUN 15 5 - 25 mg/dL    Creatinine 0 69 0 60 - 1 30 mg/dL    Glucose 125 65 - 140 mg/dL    Calcium 9 0 8 3 - 10 1 mg/dL    eGFR 74 ml/min/1 73sq m   Magnesium    Collection Time: 01/16/23  9:02 AM   Result Value Ref Range    Magnesium 1 8 1 6 - 2 6 mg/dL   COVID/FLU/RSV    Collection Time: 01/16/23  9:58 AM    Specimen: Nose; Nares   Result Value Ref Range    SARS-CoV-2 Negative Negative    INFLUENZA A PCR Negative Negative    INFLUENZA B PCR Negative Negative    RSV PCR Negative Negative   CBC    Collection Time: 01/17/23  6:20 AM   Result Value Ref Range    WBC 8 87 4 31 - 10 16 Thousand/uL    RBC 4 38 3 81 - 5 12 Million/uL    Hemoglobin 13 8 11 5 - 15 4 g/dL    Hematocrit 40 3 34 8 - 46 1 %    MCV 92 82 - 98 fL    MCH 31 5 26 8 - 34 3 pg    MCHC 34 2 31 4 - 37 4 g/dL    RDW 13 5 11 6 - 15 1 %    Platelets 895 560 - 170 Thousands/uL    MPV 10 9 8 9 - 12 7 fL   Basic metabolic panel    Collection Time: 01/17/23  6:20 AM   Result Value Ref Range    Sodium 136 135 - 147 mmol/L    Potassium 3 7 3 5 - 5 3 mmol/L    Chloride 98 96 - 108 mmol/L    CO2 26 21 - 32 mmol/L    ANION GAP 12 4 - 13 mmol/L    BUN 14 5 - 25 mg/dL    Creatinine 0 67 0 60 - 1 30 mg/dL    Glucose 106 65 - 140 mg/dL    Calcium 9 0 8 3 - 10 1 mg/dL    eGFR 74 ml/min/1 73sq m

## 2023-01-19 PROBLEM — K57.92 ACUTE DIVERTICULITIS: Status: RESOLVED | Noted: 2023-01-14 | Resolved: 2023-01-19

## 2023-01-19 LAB
BACTERIA BLD CULT: NORMAL
BACTERIA BLD CULT: NORMAL

## 2023-01-19 RX ADMIN — MECLIZINE HYDROCHLORIDE 25 MG: 25 TABLET ORAL at 21:52

## 2023-01-19 RX ADMIN — METRONIDAZOLE 500 MG: 500 INJECTION, SOLUTION INTRAVENOUS at 17:40

## 2023-01-19 RX ADMIN — METRONIDAZOLE 500 MG: 500 INJECTION, SOLUTION INTRAVENOUS at 03:05

## 2023-01-19 RX ADMIN — PANTOPRAZOLE SODIUM 40 MG: 40 INJECTION, POWDER, FOR SOLUTION INTRAVENOUS at 09:10

## 2023-01-19 RX ADMIN — NEBIVOLOL 5 MG: 10 TABLET ORAL at 09:10

## 2023-01-19 RX ADMIN — SODIUM CHLORIDE, SODIUM GLUCONATE, SODIUM ACETATE, POTASSIUM CHLORIDE, MAGNESIUM CHLORIDE, SODIUM PHOSPHATE, DIBASIC, AND POTASSIUM PHOSPHATE 75 ML/HR: .53; .5; .37; .037; .03; .012; .00082 INJECTION, SOLUTION INTRAVENOUS at 09:14

## 2023-01-19 RX ADMIN — ENOXAPARIN SODIUM 30 MG: 30 INJECTION SUBCUTANEOUS at 09:10

## 2023-01-19 RX ADMIN — CEFTRIAXONE 1000 MG: 1 INJECTION, SOLUTION INTRAVENOUS at 12:36

## 2023-01-19 RX ADMIN — PRAVASTATIN SODIUM 20 MG: 20 TABLET ORAL at 17:40

## 2023-01-19 RX ADMIN — LEVOTHYROXINE SODIUM 75 MCG: 75 TABLET ORAL at 06:18

## 2023-01-19 RX ADMIN — AMLODIPINE BESYLATE 5 MG: 5 TABLET ORAL at 09:10

## 2023-01-19 RX ADMIN — FAMOTIDINE 20 MG: 10 INJECTION, SOLUTION INTRAVENOUS at 21:52

## 2023-01-19 RX ADMIN — METRONIDAZOLE 500 MG: 500 INJECTION, SOLUTION INTRAVENOUS at 09:13

## 2023-01-19 RX ADMIN — MECLIZINE HYDROCHLORIDE 25 MG: 25 TABLET ORAL at 06:16

## 2023-01-19 NOTE — PLAN OF CARE
Problem: Potential for Falls  Goal: Patient will remain free of falls  Description: INTERVENTIONS:  - Educate patient/family on patient safety including physical limitations  - Instruct patient to call for assistance with activity   - Consult OT/PT to assist with strengthening/mobility   - Keep Call bell within reach  - Keep bed low and locked with side rails adjusted as appropriate  - Keep care items and personal belongings within reach  - Initiate and maintain comfort rounds  - Make Fall Risk Sign visible to staff  - Offer Toileting every 2 Hours, in advance of need  - Initiate/Maintain bed alarm  - Obtain necessary fall risk management equipment:   - Apply yellow socks and bracelet for high fall risk patients  - Consider moving patient to room near nurses station  Outcome: Progressing     Problem: MOBILITY - ADULT  Goal: Maintain or return to baseline ADL function  Description: INTERVENTIONS:  - Educate patient/family on patient safety including physical limitations  - Instruct patient to call for assistance with activity   - Consult OT/PT to assist with strengthening/mobility   - Keep Call bell within reach  - Keep bed low and locked with side rails adjusted as appropriate  - Keep care items and personal belongings within reach  - Initiate and maintain comfort rounds  - Make Fall Risk Sign visible to staff  - Offer Toileting every 2 Hours, in advance of need  - Initiate/Maintain bed alarm  - Obtain necessary fall risk management equipment:   - Apply yellow socks and bracelet for high fall risk patients  - Consider moving patient to room near nurses station  Outcome: Progressing  Goal: Maintains/Returns to pre admission functional level  Description: INTERVENTIONS:  - Perform BMAT or MOVE assessment daily    - Set and communicate daily mobility goal to care team and patient/family/caregiver  - Collaborate with rehabilitation services on mobility goals if consulted  - Perform Range of Motion 3 times a day    - Reposition patient every 3 hours    - Dangle patient 3 times a day  - Stand patient 3 times a day  - Ambulate patient 3 times a day  - Out of bed to chair 3 times a day   - Out of bed for meals 3 times a day  - Out of bed for toileting  - Record patient progress and toleration of activity level   Outcome: Progressing     Problem: PAIN - ADULT  Goal: Verbalizes/displays adequate comfort level or baseline comfort level  Description: Interventions:  - Encourage patient to monitor pain and request assistance  - Assess pain using appropriate pain scale  - Administer analgesics based on type and severity of pain and evaluate response  - Implement non-pharmacological measures as appropriate and evaluate response  - Consider cultural and social influences on pain and pain management  - Notify physician/advanced practitioner if interventions unsuccessful or patient reports new pain  Outcome: Progressing     Problem: INFECTION - ADULT  Goal: Absence or prevention of progression during hospitalization  Description: INTERVENTIONS:  - Assess and monitor for signs and symptoms of infection  - Monitor lab/diagnostic results  - Monitor all insertion sites, i e  indwelling lines, tubes, and drains  - Monitor endotracheal if appropriate and nasal secretions for changes in amount and color  - [place] appropriate cooling/warming therapies per order  - Administer medications as ordered  - Instruct and encourage patient and family to use good hand hygiene technique  - Identify and instruct in appropriate isolation precautions for identified infection/condition  Outcome: Progressing  Goal: Absence of fever/infection during neutropenic period  Description: INTERVENTIONS:  - Monitor WBC    Outcome: Progressing     Problem: SAFETY ADULT  Goal: Patient will remain free of falls  Description: INTERVENTIONS:  - Educate patient/family on patient safety including physical limitations  - Instruct patient to call for assistance with activity   - Consult OT/PT to assist with strengthening/mobility   - Keep Call bell within reach  - Keep bed low and locked with side rails adjusted as appropriate  - Keep care items and personal belongings within reach  - Initiate and maintain comfort rounds  - Make Fall Risk Sign visible to staff  - Offer Toileting every 2 Hours, in advance of need  - Initiate/Maintain bed alarm  - Obtain necessary fall risk management equipment:   - Apply yellow socks and bracelet for high fall risk patients  - Consider moving patient to room near nurses station  Outcome: Progressing  Goal: Maintain or return to baseline ADL function  Description: INTERVENTIONS:  - Educate patient/family on patient safety including physical limitations  - Instruct patient to call for assistance with activity   - Consult OT/PT to assist with strengthening/mobility   - Keep Call bell within reach  - Keep bed low and locked with side rails adjusted as appropriate  - Keep care items and personal belongings within reach  - Initiate and maintain comfort rounds  - Make Fall Risk Sign visible to staff  - Offer Toileting every 2 Hours, in advance of need  - Initiate/Maintain bed alarm  - Obtain necessary fall risk management equipment:   - Apply yellow socks and bracelet for high fall risk patients  - Consider moving patient to room near nurses station  Outcome: Progressing  Goal: Maintains/Returns to pre admission functional level  Description: INTERVENTIONS:  - Perform BMAT or MOVE assessment daily    - Set and communicate daily mobility goal to care team and patient/family/caregiver  - Collaborate with rehabilitation services on mobility goals if consulted  - Perform Range of Motion 3 times a day  - Reposition patient every 3 hours    - Dangle patient 3 times a day  - Stand patient 3 times a day  - Ambulate patient 3 times a day  - Out of bed to chair 3 times a day   - Out of bed for meals 3 times a day  - Out of bed for toileting  - Record patient progress and toleration of activity level   Outcome: Progressing     Problem: DISCHARGE PLANNING  Goal: Discharge to home or other facility with appropriate resources  Description: INTERVENTIONS:  - Identify barriers to discharge w/patient and caregiver  - Arrange for needed discharge resources and transportation as appropriate  - Identify discharge learning needs (meds, wound care, etc )  - Arrange for interpretive services to assist at discharge as needed  - Refer to Case Management Department for coordinating discharge planning if the patient needs post-hospital services based on physician/advanced practitioner order or complex needs related to functional status, cognitive ability, or social support system  Outcome: Progressing     Problem: Knowledge Deficit  Goal: Patient/family/caregiver demonstrates understanding of disease process, treatment plan, medications, and discharge instructions  Description: Complete learning assessment and assess knowledge base    Interventions:  - Provide teaching at level of understanding  - Provide teaching via preferred learning methods  Outcome: Progressing     Problem: GASTROINTESTINAL - ADULT  Goal: Minimal or absence of nausea and/or vomiting  Description: INTERVENTIONS:  - Administer IV fluids if ordered to ensure adequate hydration  - Maintain NPO status until nausea and vomiting are resolved  - Nasogastric tube if ordered  - Administer ordered antiemetic medications as needed  - Provide nonpharmacologic comfort measures as appropriate  - Advance diet as tolerated, if ordered  - Consider nutrition services referral to assist patient with adequate nutrition and appropriate food choices  Outcome: Progressing     Problem: Prexisting or High Potential for Compromised Skin Integrity  Goal: Skin integrity is maintained or improved  Description: INTERVENTIONS:  - Identify patients at risk for skin breakdown  - Assess and monitor skin integrity  - Assess and monitor nutrition and hydration status  - Monitor labs   - Assess for incontinence   - Turn and reposition patient  - Assist with mobility/ambulation  - Relieve pressure over bony prominences  - Avoid friction and shearing  - Provide appropriate hygiene as needed including keeping skin clean and dry  - Evaluate need for skin moisturizer/barrier cream  - Collaborate with interdisciplinary team   - Patient/family teaching  - Consider wound care consult   Outcome: Progressing     Problem: Nutrition/Hydration-ADULT  Goal: Nutrient/Hydration intake appropriate for improving, restoring or maintaining nutritional needs  Description: Monitor and assess patient's nutrition/hydration status for malnutrition  Collaborate with interdisciplinary team and initiate plan and interventions as ordered  Monitor patient's weight and dietary intake as ordered or per policy  Utilize nutrition screening tool and intervene as necessary  Determine patient's food preferences and provide high-protein, high-caloric foods as appropriate       INTERVENTIONS:  - Monitor oral intake, urinary output, labs, and treatment plans  - Assess nutrition and hydration status and recommend course of action  - Evaluate amount of meals eaten  - Assist patient with eating if necessary   - Allow adequate time for meals  - Recommend/ encourage appropriate diets, oral nutritional supplements, and vitamin/mineral supplements  - Order, calculate, and assess calorie counts as needed  - Recommend, monitor, and adjust tube feedings and TPN/PPN based on assessed needs  - Assess need for intravenous fluids  - Provide specific nutrition/hydration education as appropriate  - Include patient/family/caregiver in decisions related to nutrition  Outcome: Progressing

## 2023-01-19 NOTE — PLAN OF CARE
Problem: Potential for Falls  Goal: Patient will remain free of falls  Description: INTERVENTIONS:  - Educate patient/family on patient safety including physical limitations  - Instruct patient to call for assistance with activity   - Consult OT/PT to assist with strengthening/mobility   - Keep Call bell within reach  - Keep bed low and locked with side rails adjusted as appropriate  - Keep care items and personal belongings within reach  - Initiate and maintain comfort rounds  - Make Fall Risk Sign visible to staff  - Offer Toileting every 2 Hours, in advance of need  - Initiate/Maintain bed alarm  - Obtain necessary fall risk management equipment:   - Apply yellow socks and bracelet for high fall risk patients  - Consider moving patient to room near nurses station  1/19/2023 1251 by Shanon Lynn RN  Outcome: Progressing  1/19/2023 1038 by Shanon Lynn RN  Outcome: Progressing     Problem: MOBILITY - ADULT  Goal: Maintain or return to baseline ADL function  Description: INTERVENTIONS:  - Educate patient/family on patient safety including physical limitations  - Instruct patient to call for assistance with activity   - Consult OT/PT to assist with strengthening/mobility   - Keep Call bell within reach  - Keep bed low and locked with side rails adjusted as appropriate  - Keep care items and personal belongings within reach  - Initiate and maintain comfort rounds  - Make Fall Risk Sign visible to staff  - Offer Toileting every 2 Hours, in advance of need  - Initiate/Maintain bed alarm  - Obtain necessary fall risk management equipment:   - Apply yellow socks and bracelet for high fall risk patients  - Consider moving patient to room near nurses station  1/19/2023 1251 by Shanon Lynn RN  Outcome: Progressing  1/19/2023 1038 by Shanon Lynn RN  Outcome: Progressing  Goal: Maintains/Returns to pre admission functional level  Description: INTERVENTIONS:  - Perform BMAT or MOVE assessment daily    - Set and communicate daily mobility goal to care team and patient/family/caregiver  - Collaborate with rehabilitation services on mobility goals if consulted  - Perform Range of Motion 3 times a day  - Reposition patient every 3 hours    - Dangle patient 3 times a day  - Stand patient 3 times a day  - Ambulate patient 3 times a day  - Out of bed to chair 3 times a day   - Out of bed for meals 3 times a day  - Out of bed for toileting  - Record patient progress and toleration of activity level   1/19/2023 1251 by Rohith Banda RN  Outcome: Progressing  1/19/2023 1038 by Rohith Banda RN  Outcome: Progressing     Problem: PAIN - ADULT  Goal: Verbalizes/displays adequate comfort level or baseline comfort level  Description: Interventions:  - Encourage patient to monitor pain and request assistance  - Assess pain using appropriate pain scale  - Administer analgesics based on type and severity of pain and evaluate response  - Implement non-pharmacological measures as appropriate and evaluate response  - Consider cultural and social influences on pain and pain management  - Notify physician/advanced practitioner if interventions unsuccessful or patient reports new pain  1/19/2023 1251 by Rohith Banda RN  Outcome: Progressing  1/19/2023 1038 by Rohith Banda RN  Outcome: Progressing     Problem: INFECTION - ADULT  Goal: Absence or prevention of progression during hospitalization  Description: INTERVENTIONS:  - Assess and monitor for signs and symptoms of infection  - Monitor lab/diagnostic results  - Monitor all insertion sites, i e  indwelling lines, tubes, and drains  - Monitor endotracheal if appropriate and nasal secretions for changes in amount and color  - [place] appropriate cooling/warming therapies per order  - Administer medications as ordered  - Instruct and encourage patient and family to use good hand hygiene technique  - Identify and instruct in appropriate isolation precautions for identified infection/condition  1/19/2023 1251 by Symone Mathew RN  Outcome: Progressing  1/19/2023 1038 by Symone Mathew RN  Outcome: Progressing  Goal: Absence of fever/infection during neutropenic period  Description: INTERVENTIONS:  - Monitor WBC    1/19/2023 1251 by Symone Mathew RN  Outcome: Progressing  1/19/2023 1038 by Symone Mathew RN  Outcome: Progressing     Problem: SAFETY ADULT  Goal: Patient will remain free of falls  Description: INTERVENTIONS:  - Educate patient/family on patient safety including physical limitations  - Instruct patient to call for assistance with activity   - Consult OT/PT to assist with strengthening/mobility   - Keep Call bell within reach  - Keep bed low and locked with side rails adjusted as appropriate  - Keep care items and personal belongings within reach  - Initiate and maintain comfort rounds  - Make Fall Risk Sign visible to staff  - Offer Toileting every 2 Hours, in advance of need  - Initiate/Maintain bed alarm  - Obtain necessary fall risk management equipment:   - Apply yellow socks and bracelet for high fall risk patients  - Consider moving patient to room near nurses station  1/19/2023 1251 by Symone Mathew RN  Outcome: Progressing  1/19/2023 1038 by Symone Mathew RN  Outcome: Progressing  Goal: Maintain or return to baseline ADL function  Description: INTERVENTIONS:  - Educate patient/family on patient safety including physical limitations  - Instruct patient to call for assistance with activity   - Consult OT/PT to assist with strengthening/mobility   - Keep Call bell within reach  - Keep bed low and locked with side rails adjusted as appropriate  - Keep care items and personal belongings within reach  - Initiate and maintain comfort rounds  - Make Fall Risk Sign visible to staff  - Offer Toileting every 2 Hours, in advance of need  - Initiate/Maintain bed alarm  - Obtain necessary fall risk management equipment:   - Apply yellow socks and bracelet for high fall risk patients  - Consider moving patient to room near nurses station  1/19/2023 1251 by Marichuy Nelson RN  Outcome: Progressing  1/19/2023 1038 by Marichuy Nelson RN  Outcome: Progressing  Goal: Maintains/Returns to pre admission functional level  Description: INTERVENTIONS:  - Perform BMAT or MOVE assessment daily    - Set and communicate daily mobility goal to care team and patient/family/caregiver  - Collaborate with rehabilitation services on mobility goals if consulted  - Perform Range of Motion 3 times a day  - Reposition patient every 3 hours    - Dangle patient 3 times a day  - Stand patient 3 times a day  - Ambulate patient 3 times a day  - Out of bed to chair 3 times a day   - Out of bed for meals 3 times a day  - Out of bed for toileting  - Record patient progress and toleration of activity level   1/19/2023 1251 by Marichuy Nelson RN  Outcome: Progressing  1/19/2023 1038 by Marichuy Nelson RN  Outcome: Progressing     Problem: DISCHARGE PLANNING  Goal: Discharge to home or other facility with appropriate resources  Description: INTERVENTIONS:  - Identify barriers to discharge w/patient and caregiver  - Arrange for needed discharge resources and transportation as appropriate  - Identify discharge learning needs (meds, wound care, etc )  - Arrange for interpretive services to assist at discharge as needed  - Refer to Case Management Department for coordinating discharge planning if the patient needs post-hospital services based on physician/advanced practitioner order or complex needs related to functional status, cognitive ability, or social support system  1/19/2023 1251 by Marichuy Nelson RN  Outcome: Progressing  1/19/2023 1038 by Marichuy Nelson RN  Outcome: Progressing     Problem: Knowledge Deficit  Goal: Patient/family/caregiver demonstrates understanding of disease process, treatment plan, medications, and discharge instructions  Description: Complete learning assessment and assess knowledge base    Interventions:  - Provide teaching at level of understanding  - Provide teaching via preferred learning methods  1/19/2023 1251 by Meliton Goodrich RN  Outcome: Progressing  1/19/2023 1038 by Meliton Goodrich RN  Outcome: Progressing     Problem: GASTROINTESTINAL - ADULT  Goal: Minimal or absence of nausea and/or vomiting  Description: INTERVENTIONS:  - Administer IV fluids if ordered to ensure adequate hydration  - Maintain NPO status until nausea and vomiting are resolved  - Nasogastric tube if ordered  - Administer ordered antiemetic medications as needed  - Provide nonpharmacologic comfort measures as appropriate  - Advance diet as tolerated, if ordered  - Consider nutrition services referral to assist patient with adequate nutrition and appropriate food choices  1/19/2023 1251 by Meliton Goodrich RN  Outcome: Progressing  1/19/2023 1038 by Meliton Goodrich RN  Outcome: Progressing     Problem: Prexisting or High Potential for Compromised Skin Integrity  Goal: Skin integrity is maintained or improved  Description: INTERVENTIONS:  - Identify patients at risk for skin breakdown  - Assess and monitor skin integrity  - Assess and monitor nutrition and hydration status  - Monitor labs   - Assess for incontinence   - Turn and reposition patient  - Assist with mobility/ambulation  - Relieve pressure over bony prominences  - Avoid friction and shearing  - Provide appropriate hygiene as needed including keeping skin clean and dry  - Evaluate need for skin moisturizer/barrier cream  - Collaborate with interdisciplinary team   - Patient/family teaching  - Consider wound care consult   1/19/2023 1251 by Meliton Goodrich RN  Outcome: Progressing  1/19/2023 1038 by Meliton Goodrich RN  Outcome: Progressing     Problem: Nutrition/Hydration-ADULT  Goal: Nutrient/Hydration intake appropriate for improving, restoring or maintaining nutritional needs  Description: Monitor and assess patient's nutrition/hydration status for malnutrition  Collaborate with interdisciplinary team and initiate plan and interventions as ordered  Monitor patient's weight and dietary intake as ordered or per policy  Utilize nutrition screening tool and intervene as necessary  Determine patient's food preferences and provide high-protein, high-caloric foods as appropriate       INTERVENTIONS:  - Monitor oral intake, urinary output, labs, and treatment plans  - Assess nutrition and hydration status and recommend course of action  - Evaluate amount of meals eaten  - Assist patient with eating if necessary   - Allow adequate time for meals  - Recommend/ encourage appropriate diets, oral nutritional supplements, and vitamin/mineral supplements  - Order, calculate, and assess calorie counts as needed  - Recommend, monitor, and adjust tube feedings and TPN/PPN based on assessed needs  - Assess need for intravenous fluids  - Provide specific nutrition/hydration education as appropriate  - Include patient/family/caregiver in decisions related to nutrition  1/19/2023 1251 by Aba Ramirez RN  Outcome: Progressing  1/19/2023 1038 by Aba Ramirez RN  Outcome: Progressing

## 2023-01-19 NOTE — CASE MANAGEMENT
Case Management Discharge Planning Note    Patient name Deepak Davis  Location 2 [facility] 219/[address] MRN [id number]  : 1927 Date 2023       Current Admission Date: 2023  Current Admission Diagnosis:Acute diverticulitis   Patient Active Problem List    Diagnosis Date Noted   • Anemia 01/15/2023   • Acute diverticulitis 2023   • Age-related osteoporosis without current pathological fracture 2022   • Primary hypertension 10/26/2022   • Dyslipidemia 10/26/2022   • Acquired hypothyroidism 10/26/2022   • Gastroesophageal reflux disease without esophagitis 10/26/2022   • Osteoarthritis of multiple joints 10/26/2022      LOS (days): 5  Geometric Mean LOS (GMLOS) (days): 2 60  Days to GMLOS:-2 5     OBJECTIVE:  Risk of Unplanned Readmission Score: 10 31         Current admission status: Inpatient   Preferred Pharmacy:   [facility] [id number],  [address],  [address] [id number]  Phone: [phone] Fax: [phone]    [facility], [address] [place] [address]  Phone: [phone] Fax: [phone]    Primary Care Provider: Tamera Nicholson MD    Primary Insurance: MEDICARE  Secondary Insurance: BLUE CROSS    DISCHARGE DETAILS:    Discharge planning discussed with[de-identified] Patient/Dtr Jocy  [place] of Choice: Yes  Comments - Freedom of Choice: SW reviewed updated recommendation for STR  Patient/dtr are in agreement and verbalized #1 choice for [facility] but is agreeable to blanket referrals (excluding [facility]) if [facility] is unable to accept    CM contacted family/caregiver?: Yes  Were Treatment Team discharge recommendations reviewed with patient/caregiver?: Yes  Did patient/caregiver verbalize understanding of patient care needs?: N/A- going to facility  Were patient/caregiver advised of the risks associated with not following Treatment Team discharge recommendations?: Yes    Contacts  Patient Contacts: Jocy  Relationship to Patient[de-identified] Family  Contact Method: Phone  Phone Number: [phone]  Reason/Outcome: Discharge Planning, Continuity of Care    Other Referral/Resources/Interventions Provided:  Interventions: C, Short Term Rehab  Referral Comments: Imt[facility] Adhikari referral canceled  STR referral sent in [facility]  Response pending       Treatment Team Recommendation: Short Term Rehab  Discharge Destination Plan[de-identified] Short Term Rehab

## 2023-01-19 NOTE — PLAN OF CARE
Problem: Potential for Falls  Goal: Patient will remain free of falls  Description: INTERVENTIONS:  - Educate patient/family on patient safety including physical limitations  - Instruct patient to call for assistance with activity   - Consult OT/PT to assist with strengthening/mobility   - Keep Call bell within reach  - Keep bed low and locked with side rails adjusted as appropriate  - Keep care items and personal belongings within reach  - Initiate and maintain comfort rounds  - Make Fall Risk Sign visible to staff  - Offer Toileting every 2 Hours, in advance of need  - Initiate/Maintain bed alarm  - Obtain necessary fall risk management equipment:   - Apply yellow socks and bracelet for high fall risk patients  - Consider moving patient to room near nurses station  Outcome: Progressing     Problem: MOBILITY - ADULT  Goal: Maintain or return to baseline ADL function  Description: INTERVENTIONS:  - Educate patient/family on patient safety including physical limitations  - Instruct patient to call for assistance with activity   - Consult OT/PT to assist with strengthening/mobility   - Keep Call bell within reach  - Keep bed low and locked with side rails adjusted as appropriate  - Keep care items and personal belongings within reach  - Initiate and maintain comfort rounds  - Make Fall Risk Sign visible to staff  - Offer Toileting every 2 Hours, in advance of need  - Initiate/Maintain bed alarm  - Obtain necessary fall risk management equipment:   - Apply yellow socks and bracelet for high fall risk patients  - Consider moving patient to room near nurses station  Outcome: Progressing  Goal: Maintains/Returns to pre admission functional level  Description: INTERVENTIONS:  - Perform BMAT or MOVE assessment daily    - Set and communicate daily mobility goal to care team and patient/family/caregiver  - Collaborate with rehabilitation services on mobility goals if consulted  - Perform Range of Motion 3 times a day    - Reposition patient every 2 hours    - Dangle patient 3 times a day  - Stand patient 3 times a day  - Ambulate patient 3 times a day  - Out of bed to chair 3 times a day   - Out of bed for meals 3  Problem: PAIN - ADULT  Goal: Verbalizes/displays adequate comfort level or baseline comfort level  Description: Interventions:  - Encourage patient to monitor pain and request assistance  - Assess pain using appropriate pain scale  - Administer analgesics based on type and severity of pain and evaluate response  - Implement non-pharmacological measures as appropriate and evaluate response  - Consider cultural and social influences on pain and pain management  - Notify physician/advanced practitioner if interventions unsuccessful or patient reports new pain  Outcome: Progressing     Problem: INFECTION - ADULT  Goal: Absence or prevention of progression during hospitalization  Description: INTERVENTIONS:  - Assess and monitor for signs and symptoms of infection  - Monitor lab/diagnostic results  - Monitor all insertion sites, i e  indwelling lines, tubes, and drains  - Monitor endotracheal if appropriate and nasal secretions for changes in amount and color  - [place] appropriate cooling/warming therapies per order  - Administer medications as ordered  - Instruct and encourage patient and family to use good hand hygiene technique  - Identify and instruct in appropriate isolation precautions for identified infection/condition  Outcome: Progressing  Goal: Absence of fever/infection during neutropenic period  Description: INTERVENTIONS:  - Monitor WBC    Outcome: Progressing     Problem: DISCHARGE PLANNING  Goal: Discharge to home or other facility with appropriate resources  Description: INTERVENTIONS:  - Identify barriers to discharge w/patient and caregiver  - Arrange for needed discharge resources and transportation as appropriate  - Identify discharge learning needs (meds, wound care, etc )  - Arrange for interpretive services to assist at discharge as needed  - Refer to Case Management Department for coordinating discharge planning if the patient needs post-hospital services based on physician/advanced practitioner order or complex needs related to functional status, cognitive ability, or social support system  Outcome: Progressing     Problem: Knowledge Deficit  Goal: Patient/family/caregiver demonstrates understanding of disease process, treatment plan, medications, and discharge instructions  Description: Complete learning assessment and assess knowledge base  Interventions:  - Provide teaching at level of understanding  - Provide teaching via preferred learning methods  Outcome: Progressing     Problem: Nutrition/Hydration-ADULT  Goal: Nutrient/Hydration intake appropriate for improving, restoring or maintaining nutritional needs  Description: Monitor and assess patient's nutrition/hydration status for malnutrition  Collaborate with interdisciplinary team and initiate plan and interventions as ordered  Monitor patient's weight and dietary intake as ordered or per policy  Utilize nutrition screening tool and intervene as necessary  Determine patient's food preferences and provide high-protein, high-caloric foods as appropriate       INTERVENTIONS:  - Monitor oral intake, urinary output, labs, and treatment plans  - Assess nutrition and hydration status and recommend course of action  - Evaluate amount of meals eaten  - Assist patient with eating if necessary   - Allow adequate time for meals  - Recommend/ encourage appropriate diets, oral nutritional supplements, and vitamin/mineral supplements  - Order, calculate, and assess calorie counts as needed  - Recommend, monitor, and adjust tube feedings and TPN/PPN based on assessed needs  - Assess need for intravenous fluids  - Provide specific nutrition/hydration education as appropriate  - Include patient/family/caregiver in decisions related to nutrition  Outcome: Progressing    times a day  - Out of bed for toileting  - Record patient progress and toleration of activity level   Outcome: Progressing

## 2023-01-19 NOTE — PROGRESS NOTES
[facility] Progress Note  Patient: Lydia Truong 80 y o  female   MRN: [id number]  PCP: Waqas Conley MD  Unit/Bed#: 2 [place] [address] A Encounter: [id number]  Date Of Visit: 01/19/23    Problem List:    Principal Problem:    Acute diverticulitis  Active Problems:    Primary hypertension    Dyslipidemia    Acquired hypothyroidism    Gastroesophageal reflux disease without esophagitis    Osteoarthritis of multiple joints    Anemia      Assessment & Plan:    * Acute diverticulitis-resolved as of 1/19/2023  Assessment & Plan  Improving, Clinically,      Presented with 1 day history of abdominal pain, nausea vomiting diarrhea  CT revealed evidence of acute diverticulitis involving sigmoid colon without any complication  Mild leukocytosis on presentation, now improved  Abdomen exam remains benign but appears distended  · Continue IV ceftriaxone, metronidazole  · Abdominal exam  · Clear liquid diet as tolerated  · Symptomatic treatment  • GI recommendations:  • Pt started on clears  • IV fluids  • IV PPI, Pepcid  • IV antiemetics, Zofran ordered PRN, Reglan ordered every 6 hrs scheduled, transitioned to PRN   • Ordered KUB due to abdominal distention which showed slightly prominent loops of air-filled small bowel are seen throughout the abdomen, possibly representing a mild ileus  Patient was tolerating clear liquid diet and advanced to low residue lactose restricted    Anemia  Assessment & Plan  Likely dilutional,  Monitor    Osteoarthritis of multiple joints  Assessment & Plan  · PT/OT: Post acute rehabilitation services    Gastroesophageal reflux disease without esophagitis  Assessment & Plan  Continue PPI, changed to IV  IV Pepcid nightly    Acquired hypothyroidism  Assessment & Plan  Continue Synthroid 75 mcg    Dyslipidemia  Assessment & Plan  On statin    Primary hypertension  Assessment & Plan  Elevated on presentation, overall appears stable at present  · Resume home meds, amlodipine, Bystolic with holding parameters  · Holding hydrochlorothiazide    Nausea vomiting and diarrhea-resolved as of 1/17/2023  Assessment & Plan  Resolved,     Presented with intractable nausea vomiting and multiple episodes of loose bowel movements since yesterday evening  Patient reports history of severe lactose intolerance and previous bouts of intractable nausea vomiting requiring hospitalization  Afebrile, mild leukocytosis  CT abdomen without any evidence of obstruction or colitis  Suspect gastroenteritis versus precipitation secondary to acute diverticulitis   Slowly improving   · Continue around-the-clock Reglan,  · IV fluids  · IV PPI, Pepcid  · Clear liquid diet, will attempt to advance as tolerated  · IV antiemetics, standing Reglan with Zofran as needed  · Stool studies if diarrhea  · GI [id number] recommendations  · Monitor abdominal exam  · Monitor symptoms        VTE Pharmacologic Prophylaxis: VTE Score: 5 Moderate Risk (Score 3-4) - Pharmacological DVT Prophylaxis Ordered: enoxaparin (Lovenox)  Patient Centered Rounds: I performed bedside rounds with nursing staff today  Discussions with Specialists or Other Care Team Provider: Yes, GI    Education and Discussions with Family / Patient: Updated  (daughter) at bedside  Yesterday    Time Spent for Care: 45 minutes  More than 50% of total time spent on counseling and coordination of care as described above  Current Length of Stay: 5 day(s)  Current Patient Status: Inpatient   Certification Statement: The patient will continue to require additional inpatient hospital stay due to Clinical course and rehab placement  Discharge Plan: Anticipate discharge in 48-72 hrs to discharge location to be determined pending rehab evaluations  Code Status: Level 1 - Full Code    Subjective:     Patient seen and examined at bedside with both son and daughter present  Patient reported no abdominal pain, diarrhea or nausea    Patient had no other concerns at this time  Patient's daughter and son informed of plan of care and awaiting STIR  Nursing reported that patient was able to have 2 bowel movements with no complications    Objective:     Vitals:   Temp (24hrs), Av 8 °F (36 6 °C), Min:97 3 °F (36 3 °C), Max:98 2 °F (36 8 °C)    Temp:  [97 3 °F (36 3 °C)-98 2 °F (36 8 °C)] 97 8 °F (36 6 °C)  HR:  [71-77] 73  Resp:  [18] 18  BP: (153-161)/(55-61) 153/61  SpO2:  [93 %-97 %] 96 % on room air  Body mass index is 26 86 kg/m²  Input and Output Summary (last 24 hours): Intake/Output Summary (Last 24 hours) at 2023 1651  Last data filed at 2023 1236  Gross per 24 hour   Intake --   Output 700 ml   Net -700 ml       Physical Exam:   Physical Exam  Vitals reviewed  Constitutional:       General: She is not in acute distress  Appearance: Normal appearance  HENT:      Head: Atraumatic  Nose: No congestion  Eyes:      General: No scleral icterus  Pupils: Pupils are equal, round, and reactive to light  Cardiovascular:      Rate and Rhythm: Normal rate  Heart sounds: No murmur heard  Pulmonary:      Effort: No respiratory distress  Breath sounds: No wheezing, rhonchi or rales  Abdominal:      General: There is distension  Palpations: Abdomen is soft  Tenderness: There is no abdominal tenderness  There is no guarding  Musculoskeletal:         General: No swelling or deformity  Normal range of motion  Cervical back: No tenderness  Right lower leg: No edema  Left lower leg: No edema  Feet:      Right foot:      Skin integrity: No callus  Skin:     General: Skin is warm  Capillary Refill: Capillary refill takes less than 2 seconds  Findings: No lesion or rash  Neurological:      Mental Status: She is oriented to person, place, and time  Cranial Nerves: No cranial nerve deficit        Coordination: Coordination normal    Psychiatric:         Mood and Affect: Mood normal  Behavior: Behavior normal          Thought Content: Thought content normal          Additional Data:     Labs:  Results from last 7 days   Lab Units 01/17/23  0620 01/15/23  0607 01/14/23  0606   WBC Thousand/uL 8 87   < > 10 47*   HEMOGLOBIN g/dL 13 8   < > 13 1   HEMATOCRIT % 40 3   < > 39 3   PLATELETS Thousands/uL 248   < > 175   NEUTROS PCT %  --   --  84*   LYMPHS PCT %  --   --  10*   MONOS PCT %  --   --  4   EOS PCT %  --   --  0    < > = values in this interval not displayed  Results from last 7 days   Lab Units 01/17/23  0620 01/16/23  0902 01/15/23  0607   SODIUM mmol/L 136   < > 142   POTASSIUM mmol/L 3 7   < > 3 4*   CHLORIDE mmol/L 98   < > 102   CO2 mmol/L 26   < > 32   BUN mg/dL 14   < > 14   CREATININE mg/dL 0 67   < > 1 01   ANION GAP mmol/L 12   < > 8   CALCIUM mg/dL 9 0   < > 8 7   ALBUMIN g/dL  --   --  2 6*   TOTAL BILIRUBIN mg/dL  --   --  0 70   ALK PHOS U/L  --   --  40*   ALT U/L  --   --  14   AST U/L  --   --  18   GLUCOSE RANDOM mg/dL 106   < > 98    < > = values in this interval not displayed  Results from last 7 days   Lab Units 01/14/23  0606   LACTIC ACID mmol/L 2 0       Lines/Drains:  Invasive Devices     Peripheral Intravenous Line  Duration           Peripheral IV 01/15/23 Left Antecubital 4 days          Drain  Duration           External Urinary Catheter 3 days                      Imaging: Reviewed radiology reports from this admission including: chest xray and abdominal/pelvic CT    Recent Cultures (last 7 days):   Results from last 7 days   Lab Units 01/14/23  0646 01/14/23  0606   BLOOD CULTURE  No Growth After 5 Days  No Growth After 5 Days         Last 24 Hours Medication List:   Current Facility-Administered Medications   Medication Dose Route Frequency Provider Last Rate   • acetaminophen  650 mg Oral Q6H PRN Alayna Leary MD     • amLODIPine  5 mg Oral Daily Alayna Leary MD     • cefTRIAXone  1,000 mg Intravenous Q24H Alayna Leary MD 1,000 mg (01/19/23 1236)   • diphenhydrAMINE  25 mg Oral HS PRN Doug Albright MD     • enoxaparin  30 mg Subcutaneous Daily Doug Albright MD     • famotidine  20 mg Intravenous HS Doug Albright MD     • levothyroxine  75 mcg Oral Once per day on Sun Tue Thu Fri Sat Doug Albright MD     • meclizine  25 mg Oral [facility] Doug Albright MD     • metoclopramide  5 mg Intravenous Q6H PRN Afshan Georges PA-C     • metroNIDAZOLE  500 mg Intravenous Q8H Doug Albright  mg (01/19/23 0913)   • nebivolol  5 mg Oral QAM Doug Albright MD     • ondansetron  4 mg Intravenous Q6H PRN Afshan Georges PA-C     • pantoprazole  40 mg Intravenous Q24H Marquis Duque MD     • pravastatin  20 mg Oral Daily With Nicola Alcaraz MD          Today, Patient Was Seen By: Brandon Montemayor MD    ** Please Note: "This note has been constructed using a voice recognition system  Therefore there may be syntax, spelling, and/or grammatical errors   Please call if you have any questions  "**

## 2023-01-20 RX ORDER — NEBIVOLOL 5 MG/1
5 TABLET ORAL EVERY MORNING
Qty: 30 TABLET | Refills: 1
Start: 2023-01-21

## 2023-01-20 RX ADMIN — LEVOTHYROXINE SODIUM 75 MCG: 75 TABLET ORAL at 06:06

## 2023-01-20 RX ADMIN — AMLODIPINE BESYLATE 5 MG: 5 TABLET ORAL at 08:55

## 2023-01-20 RX ADMIN — METRONIDAZOLE 500 MG: 500 INJECTION, SOLUTION INTRAVENOUS at 01:44

## 2023-01-20 RX ADMIN — Medication 0.5 ML: at 15:43

## 2023-01-20 RX ADMIN — NEBIVOLOL 5 MG: 10 TABLET ORAL at 08:55

## 2023-01-20 RX ADMIN — ENOXAPARIN SODIUM 30 MG: 30 INJECTION SUBCUTANEOUS at 08:55

## 2023-01-20 RX ADMIN — PANTOPRAZOLE SODIUM 40 MG: 40 INJECTION, POWDER, FOR SOLUTION INTRAVENOUS at 08:55

## 2023-01-20 RX ADMIN — METRONIDAZOLE 500 MG: 500 INJECTION, SOLUTION INTRAVENOUS at 11:19

## 2023-01-20 NOTE — INCIDENTAL FINDINGS
The following findings require follow up:  Radiographic finding   Finding: CT abdomen pelvis with contrast: Acute diverticulitis  No free air or abscess  , Workstation performed:       Follow up required: yes    Follow up should be done within 2  week(s)    Please notify the following clinician to assist with the follow up:   PCP and GI

## 2023-01-20 NOTE — CASE MANAGEMENT
Case Management Discharge Planning Note    Patient name Des Leone  [facility] [address] MRN [id number]  : 1927 Date 2023       Current Admission Date: 2023  Current Admission Diagnosis:Acquired hypothyroidism   Patient Active Problem List    Diagnosis Date Noted   • Anemia 01/15/2023   • Age-related osteoporosis without current pathological fracture 2022   • Primary hypertension 10/26/2022   • Dyslipidemia 10/26/2022   • Acquired hypothyroidism 10/26/2022   • Gastroesophageal reflux disease without esophagitis 10/26/2022   • Osteoarthritis of multiple joints 10/26/2022      LOS (days): 6  Geometric Mean LOS (GMLOS) (days): 2 60  Days to GMLOS:-3 6     OBJECTIVE:  Risk of Unplanned Readmission Score: 10 36         Current admission status: Inpatient   Preferred Pharmacy:   [facility] Portia Dodd, [address],  [address] [id number]  Phone: [phone] Fax: [phone] [address] [facility] [address]  Phone: [phone] Fax: [phone]    Primary Care Provider: Ketan Chavez MD    Primary Insurance: MEDICARE  Secondary Insurance: BLUE CROSS    DISCHARGE DETAILS:    Transport at Discharge : Wheelchair van     Number/Name of Dispatcher: SLETS  Transported by [employer] and Unit #): [facility]  ETA of Transport (Date): 23  ETA of Transport (Time): [address], [address]  Receiving Facility/Agency Phone Number: [phone]  Facility/Agency Fax Number: [phone]    Attending, unit nurse, dtr, and SNF admissions updated of pickup time

## 2023-01-20 NOTE — [place] UNIVERSAL TRANSFER FORM
[place] UNIVERSAL TRANSFER FORM  (ALL ITEMS MUST BE COMPLETED)    1  TRANSFER FROM: [address]    2  DATE OF TRANSFER: 1/20/2023                        TIME OF TRANSFER: 1600    3  PATIENT NAME: RONAL Caraballo      YOB: 1927                             GENDER: female    4  LANGUAGE:   English    5  PHYSICIAN NAME:  Sonia Peterson MD                   PHONE: [phone]  CODE STATUS: Level 1 - Full Code        Out of Hospital DNR Attached: No    7  :                                      :  Extended Emergency Contact Information  Primary Emergency Contact: Raymundo Hivdorys [facility]  Mobile Phone: [phone]  Relation: None           Health Care Representative/Proxy:  Yes           Legal Guardian:  No             NAME OF:           HEALTH CARE REPRESENTATIVE/PROXY:                                         OR           LEGAL GUARDIAN, IF NOT :                                               PHONE:  (Day)           (Night)                        (Cell)    8  REASON FOR TRANSFER: (Must include brief medical history and recent changes in physical function or cognition ) pt weak B/L LE            V/S: /57   Pulse 74   Temp 98 9 °F (37 2 °C)   Resp 16   Ht 4' 11" (1 499 m)   Wt 60 3 kg (133 lb)   SpO2 96%   BMI 26 86 kg/m²           PAIN: None    9  PRIMARY DIAGNOSIS: Acute diverticulitis      Secondary Diagnosis:         Pacemaker: No      Internal Defib: No          Mental Health Diagnosis (if Applicable):    10  RESTRAINTS: No     11  RESPIRATORY NEEDS: None    12  ISOLATION/PRECAUTION: None    13  ALLERGY: Allegra [fexofenadine], Sulfa antibiotics, and Aspirin    14  SENSORY:       Vision Glasses    15  SKIN CONDITION: No Wounds    16  DIET: Special (describe) GI lo fiber/residue    17  IV ACCESS: None    18   PERSONAL ITEMS SENT WITH PATIENT: Glasses    19  ATTACHED DOCUMENTS: MUST ATTACH CURRENT MEDICATION INFORMATION Face Sheet    20  AT RISK ALERTS:Falls        HARM TO: N/A    21  WEIGHT BEARING STATUS:         Left Leg: Full        Right Leg: Full    22  MENTAL STATUS:Alert and Oriented    23  FUNCTION:        Walk: Self        Transfer: With Help        Toilet: Self        Feed: Self    24  IMMUNIZATIONS/SCREENING:     Immunization History   Administered Date(s) Administered   • COVID-19 MODERNA VACC 0 5 ML IM 03/15/2021, 04/14/2021   • Influenza, high dose seasonal 0 7 mL 10/26/2022       25  BOWEL: Incontinent     26  BLADDER: Continent    27   SENDING FACILITY CONTACT:  [facility]                   Title: RN        Unit: [address]        Phone: [phone] [address] (if known):        Title:        Unit:         Phone:         FORM PREFILLED BY (if applicable)       Title:       Unit:        Phone:         FORM COMPLETED BY Katrina Laboy RN      Title: vega      Phone: [phone]

## 2023-01-20 NOTE — PLAN OF CARE
Problem: INFECTION - ADULT  Goal: Absence or prevention of progression during hospitalization  Description: INTERVENTIONS:  - Assess and monitor for signs and symptoms of infection  - Monitor lab/diagnostic results  - Monitor all insertion sites, i e  indwelling lines, tubes, and drains  - Monitor endotracheal if appropriate and nasal secretions for changes in amount and color  - [place] appropriate cooling/warming therapies per order  - Administer medications as ordered  - Instruct and encourage patient and family to use good hand hygiene technique  - Identify and instruct in appropriate isolation precautions for identified infection/condition  Outcome: Progressing     Problem: INFECTION - ADULT  Goal: Absence of fever/infection during neutropenic period  Description: INTERVENTIONS:  - Monitor WBC    Outcome: Progressing     Problem: SAFETY ADULT  Goal: Patient will remain free of falls  Description: INTERVENTIONS:  - Educate patient/family on patient safety including physical limitations  - Instruct patient to call for assistance with activity   - Consult OT/PT to assist with strengthening/mobility   - Keep Call bell within reach  - Keep bed low and locked with side rails adjusted as appropriate  - Keep care items and personal belongings within reach  - Initiate and maintain comfort rounds  - Make Fall Risk Sign visible to staff  - Offer Toileting every 2 Hours, in advance of need  - Initiate/Maintain bed alarm  - Obtain necessary fall risk management equipment:   - Apply yellow socks and bracelet for high fall risk patients  - Consider moving patient to room near nurses station  Outcome: Progressing     Problem: GASTROINTESTINAL - ADULT  Goal: Minimal or absence of nausea and/or vomiting  Description: INTERVENTIONS:  - Administer IV fluids if ordered to ensure adequate hydration  - Maintain NPO status until nausea and vomiting are resolved  - Nasogastric tube if ordered  - Administer ordered antiemetic medications as needed  - Provide nonpharmacologic comfort measures as appropriate  - Advance diet as tolerated, if ordered  - Consider nutrition services referral to assist patient with adequate nutrition and appropriate food choices  Outcome: Progressing

## 2023-01-20 NOTE — CASE MANAGEMENT
Case Management Discharge Planning Note    Patient name Becca Cordon  Location 2 [facility] 219/[address] MRN [id number]  : 1927 Date 2023       Current Admission Date: 2023  Current Admission Diagnosis:Acquired hypothyroidism   Patient Active Problem List    Diagnosis Date Noted   • Anemia 01/15/2023   • Age-related osteoporosis without current pathological fracture 2022   • Primary hypertension 10/26/2022   • Dyslipidemia 10/26/2022   • Acquired hypothyroidism 10/26/2022   • Gastroesophageal reflux disease without esophagitis 10/26/2022   • Osteoarthritis of multiple joints 10/26/2022      LOS (days): 6  Geometric Mean LOS (GMLOS) (days): 2 60  Days to GMLOS:-3 4     OBJECTIVE:  Risk of Unplanned Readmission Score: 10 33         Current admission status: Inpatient   Preferred Pharmacy:   [facility] [address], [address] [facility] [address] [facility]  [address] [facility],  [id number]  Phone: [phone] Fax: [phone] [address] [facility] Mail [facility] [place] [address]  Phone: [phone] Fax: [phone]    Primary Care Provider: Carmella Fragoso MD    Primary Insurance: MEDICARE  Secondary Insurance: BLUE CROSS    DISCHARGE DETAILS:    Discharge planning discussed with[de-identified] Patient, Dtr Jocy  [place] of Choice: Yes  Comments - Freedom of Choice: Patient has been offered a bed at #1 choice facility [facility]  Patient and dtr accepting offer    CM contacted family/caregiver?: Yes  Were Treatment Team discharge recommendations reviewed with patient/caregiver?: Yes  Did patient/caregiver verbalize understanding of patient care needs?: N/A- going to facility  Were patient/caregiver advised of the risks associated with not following Treatment Team discharge recommendations?: Yes    Contacts  Patient Contacts: Jocy  Relationship to Patient[de-identified] Family  Contact Method: Phone  Phone Number: [phone]  Reason/Outcome: Discharge Planning, Continuity of Care, Emergency Contact    Treatment Team Recommendation: Short Term Rehab  Discharge Destination Plan[de-identified] Short Term Rehab  Transport at Discharge : Wheelchair Apryl Cohen     Number/Name of Dispatcher: Nayely Mcintosh by Daciat and Unit #): TBD  ETA of Transport (Date): 01/20/23  ETA of Transport (Time):  (TBD)     IMM Given (Date):: 01/20/23  IMM Given to[de-identified] Patient (IMM#2 reviewed with patient  Patient gave verbal understanding, signed, and was provided with original  Copy placed in scan bin )    Patient requires booster prior to admission to SNF  SW discussed bedside with patient  Patient is agreeable  Attending and unit nurse aware to administer prior to discharge  WCV requested  Pickup time pending

## 2023-01-20 NOTE — DISCHARGE SUMMARY
[address]  Discharge- Boston Blakely 2/14/1927, 80 y o  female MRN: [id number]  Unit/Bed#: [address] [place] [address] Encounter: [id number]  Primary Care Provider: Pablito Milton MD   Date and time admitted to hospital: 1/14/2023  5:50 AM    * Acute diverticulitis-resolved as of 1/19/2023  Assessment & Plan  Improving, Clinically,      Presented with 1 day history of abdominal pain, nausea vomiting diarrhea  CT revealed evidence of acute diverticulitis involving sigmoid colon without any complication  Mild leukocytosis on presentation, now improved  Abdomen exam remains benign but appears distended  · Continue IV ceftriaxone, metronidazole  · Abdominal exam  · Clear liquid diet as tolerated  · Symptomatic treatment  • GI recommendations:  • Pt started on clears  • IV fluids  • IV PPI, Pepcid  • IV antiemetics, Zofran ordered PRN, Reglan ordered every 6 hrs scheduled, transitioned to PRN   • Ordered KUB due to abdominal distention which showed slightly prominent loops of air-filled small bowel are seen throughout the abdomen, possibly representing a mild ileus  Patient was tolerating clear liquid diet and advanced to low residue lactose restricted    Anemia  Assessment & Plan  Likely dilutional,  Monitor    Osteoarthritis of multiple joints  Assessment & Plan  · PT/OT: Post acute rehabilitation services    Gastroesophageal reflux disease without esophagitis  Assessment & Plan  Continue home medication OTC    Acquired hypothyroidism  Assessment & Plan  Continue Synthroid 75 mcg    Dyslipidemia  Assessment & Plan  On statin    Primary hypertension  Assessment & Plan  Elevated on presentation, overall appears stable at present    · Resume home meds, amlodipine 5 mg, Bystolic decreased dose to 5 mg, HCTZ-reevaluate with PCP      Nausea vomiting and diarrhea-resolved as of 1/17/2023  Assessment & Plan  Resolved,     Presented with intractable nausea vomiting and multiple episodes of loose bowel movements since yesterday evening  Patient reports history of severe lactose intolerance and previous bouts of intractable nausea vomiting requiring hospitalization  Afebrile, mild leukocytosis  CT abdomen without any evidence of obstruction or colitis  Suspect gastroenteritis versus precipitation secondary to acute diverticulitis   Slowly improving   · Continue around-the-clock Reglan,  · IV fluids  · IV PPI, Pepcid  · Clear liquid diet, will attempt to advance as tolerated  · IV antiemetics, standing Reglan with Zofran as needed  · Stool studies if diarrhea  · GI mzxgxiapac-qjkumg-yl recommendations  · Monitor abdominal exam  · Monitor symptoms      Medical Problems     Resolved Problems  Date Reviewed: 1/20/2023          Resolved    * (Principal) Acute diverticulitis 1/19/2023     Resolved by  Sirisha Rodriguez MD    Nausea vomiting and diarrhea 1/17/2023     Resolved by  Sirisha Rodriguez MD        Discharging Physician / Practitioner: Sirisha Rodriguez MD  PCP: Chelsea Doan MD  Admission Date:   Admission Orders (From admission, onward)     Ordered        01/14/23 1001  INPATIENT ADMISSION  Once                      Discharge Date: 01/20/23    Consultations During Hospital Stay:  · GI,     Procedures Performed:   · N/A    Significant Findings / Test Results:   · N/A    Incidental Findings:   · Diverticulitis  · I reviewed the above mentioned incidental findings with the patient and/or family and they expressed understanding  Test Results Pending at Discharge (will require follow up):   · N/A       Outpatient Tests Requested:  · N/A      Complications: N/A    Reason for Admission: Abdominal pain    Hospital Course:   Seth Glass is a 80 y o  female patient who originally presented to the hospital on 1/14/2023 abdominal pain associated with intractable nausea vomiting and diarrhea  While inpatient CT imaging determined patient had diverticulitis  Patient was treated with IV antibiotics and all symptoms resolved  Patient was seen by GI and will follow up with PCP on discharge  Patient noted to have deconditioned and was evaluated by PT/OT and determined postacute rehab services  Patient's family shared decision making and agreed with plan on discharge  Please see above list of diagnoses and related plan for additional information  Condition at Discharge: stable    Discharge Day Visit / Exam:   Subjective: Patient seen and examined at bedside with daughter present  Patient denied any headache, blurry vision, chest pain, shortness of breath, nausea, vomiting, diarrhea or abdominal pain  Patient and family had no other concerns at this time  Vitals: Blood Pressure: 138/57 (01/20/23 0852)  Pulse: 72 (01/20/23 0852)  Temperature: 98 2 °F (36 8 °C) (01/19/23 2314)  Temp Source: Oral (01/19/23 2314)  Respirations: 16 (01/19/23 2314)  Height: 4' 11" (149 9 cm) (01/14/23 1300)  Weight - Scale: 60 3 kg (133 lb) (01/14/23 1300)  SpO2: 95 % (01/20/23 3433)     Exam:   Physical Exam  Vitals and nursing note reviewed  Constitutional:       General: She is not in acute distress  Appearance: She is well-developed  HENT:      Head: Normocephalic and atraumatic  Eyes:      Conjunctiva/sclera: Conjunctivae normal    Cardiovascular:      Rate and Rhythm: Normal rate and regular rhythm  Heart sounds: No murmur heard  No friction rub  No gallop  Pulmonary:      Effort: Pulmonary effort is normal  No respiratory distress  Breath sounds: Normal breath sounds  No stridor  No wheezing, rhonchi or rales  Abdominal:      Palpations: Abdomen is soft  Tenderness: There is no abdominal tenderness  There is no guarding or rebound  Musculoskeletal:         General: No swelling or tenderness  Cervical back: Neck supple  Right lower leg: No edema  Left lower leg: No edema  Skin:     General: Skin is warm and dry  Capillary Refill: Capillary refill takes less than 2 seconds        Findings: No bruising  Neurological:      Mental Status: She is alert and oriented to person, place, and time  Motor: No weakness  Psychiatric:         Mood and Affect: Mood normal          Behavior: Behavior normal           Discussion with Family: Updated  (daughter) at bedside  Discharge instructions/Information to patient and family:   See after visit summary for information provided to patient and family  Provisions for Follow-Up Care:  See after visit summary for information related to follow-up care and any pertinent home health orders  Disposition:   Acute Rehab at [facility]    Planned Readmission: NO     Discharge Statement:  I spent 45 minutes discharging the patient  This time was spent on the day of discharge  I had direct contact with the patient on the day of discharge  Greater than 50% of the total time was spent examining patient, answering all patient questions, arranging and discussing plan of care with patient as well as directly providing post-discharge instructions  Additional time then spent on discharge activities  Discharge Medications:  See after visit summary for reconciled discharge medications provided to patient and/or family        **Please Note: This note may have been constructed using a voice recognition system**

## 2023-01-20 NOTE — NURSING NOTE
Patient d/c to [facility]  IV removed per policy and procedure, occlusive dressing intact  Report given to receiving facility before patient's arrival  All questions answered  Patients belongings sent home with daughter prior to d/c

## 2023-01-20 NOTE — [place] UNIVERSAL TRANSFER FORM
[place] UNIVERSAL TRANSFER FORM  (ALL ITEMS MUST BE COMPLETED)    1  TRANSFER FROM: Selena Burns TO: ***    2  DATE OF TRANSFER: 1/20/2023                        TIME OF TRANSFER: ***    3  PATIENT NAME: RONAL Cano      YOB: 1927                             GENDER: female    4  LANGUAGE:   English    5  PHYSICIAN NAME:  Nery Landa MD                   PHONE: [phone]  CODE STATUS: Level 1 - Full Code        Out of Hospital DNR Attached: {Yes/No:20651}    7  :                                      :  Extended Emergency Contact Information  Primary Emergency Contact: [facility]  Mobile Phone: [phone]  Relation: None           Health Care Representative/Proxy:  {Yes/No:20651}           Legal Guardian:  {Yes/No:20651}             NAME OF:           HEALTH CARE REPRESENTATIVE/PROXY:                                         OR           LEGAL GUARDIAN, IF NOT :                                               PHONE:  (Day)           (Night)                        (Cell)    8  REASON FOR TRANSFER: (Must include brief medical history and recent changes in physical function or cognition ) ***            V/S: /57   Pulse 74   Temp 98 9 °F (37 2 °C)   Resp 16   Ht 4' 11" (1 499 m)   Wt 60 3 kg (133 lb)   SpO2 96%   BMI 26 86 kg/m²           PAIN: {Pain:20652}    9  PRIMARY DIAGNOSIS: Acute diverticulitis      Secondary Diagnosis:         Pacemaker: {Yes/No:20651}      Internal Defib: {Yes/No:20651}          Mental Health Diagnosis (if Applicable):    10  RESTRAINTS: {Restraints:20654}     11  RESPIRATORY NEEDS: {Respiratory Needs:20655}    12  ISOLATION/PRECAUTION: {Isolation Precautions:20657}    13  ALLERGY: Allegra [fexofenadine], Sulfa antibiotics, and Aspirin    14  SENSORY:       {Sensory:20658}    15  SKIN CONDITION: {Skin Cond:20725}    16  DIET: {Diet:20783}    17  IV ACCESS: {IV Access:20887}    18  PERSONAL ITEMS SENT WITH PATIENT: {Personal Items:20888}    19  ATTACHED DOCUMENTS: MUST ATTACH CURRENT MEDICATION INFORMATION {Attached Documents:20891}    20  AT RISK ALERTS:{Risks:20892}        HARM TO: {Harm To:20893}    21  WEIGHT BEARING STATUS:         Left Leg: {None/Limited/Full:20896}        Right Leg: {None/Limited/Full:20896}    22  MENTAL STATUS:{Mental Status:20897}    23  FUNCTION:        Walk: {Self/With Help/Not Able:20899}        Transfer: {Self/With Help/Not Able:20899}        Toilet: {Self/With Help/Not Able:20899}        Feed: {Self/With Help/Not Able:20899}    24  IMMUNIZATIONS/SCREENING:     Immunization History   Administered Date(s) Administered   • COVID-19 MODERNA VACC 0 5 ML IM 03/15/2021, 04/14/2021   • Influenza, high dose seasonal 0 7 mL 10/26/2022       25  BOWEL: {Bowel:20900}    26  BLADDER: {Bladder:20901}    27   SENDING FACILITY CONTACT: ***                  Title: ***        Unit: ***        Phone: ***          REC'G FACILITY CONTACT (if known):        Title:        Unit:         Phone:         FORM PREFILLED BY (if applicable)       Title:       Unit:        Phone:         Melani Fleming BY Nelson Peters RN      Title: ***      Phone: ***

## 2023-01-26 VITALS
TEMPERATURE: 98.9 F | HEART RATE: 74 BPM | DIASTOLIC BLOOD PRESSURE: 57 MMHG | SYSTOLIC BLOOD PRESSURE: 141 MMHG | HEIGHT: 59 IN | RESPIRATION RATE: 16 BRPM | WEIGHT: 133 LBS | BODY MASS INDEX: 26.81 KG/M2 | OXYGEN SATURATION: 96 %

## 2023-02-17 ENCOUNTER — TELEPHONE (OUTPATIENT)
Dept: FAMILY MEDICINE CLINIC | Facility: CLINIC | Age: 88
End: 2023-02-17

## 2023-06-22 ENCOUNTER — TELEPHONE (OUTPATIENT)
Dept: FAMILY MEDICINE CLINIC | Facility: CLINIC | Age: 88
End: 2023-06-22

## 2023-07-14 ENCOUNTER — OFFICE VISIT (OUTPATIENT)
Dept: FAMILY MEDICINE CLINIC | Facility: CLINIC | Age: 88
End: 2023-07-14
Payer: MEDICARE

## 2023-07-14 VITALS
HEART RATE: 73 BPM | WEIGHT: 129 LBS | SYSTOLIC BLOOD PRESSURE: 140 MMHG | OXYGEN SATURATION: 97 % | BODY MASS INDEX: 27.83 KG/M2 | HEIGHT: 57 IN | DIASTOLIC BLOOD PRESSURE: 80 MMHG

## 2023-07-14 DIAGNOSIS — Z13.0 SCREENING FOR DEFICIENCY ANEMIA: ICD-10-CM

## 2023-07-14 DIAGNOSIS — E03.9 ACQUIRED HYPOTHYROIDISM: ICD-10-CM

## 2023-07-14 DIAGNOSIS — I10 PRIMARY HYPERTENSION: Primary | ICD-10-CM

## 2023-07-14 DIAGNOSIS — M15.9 PRIMARY OSTEOARTHRITIS INVOLVING MULTIPLE JOINTS: ICD-10-CM

## 2023-07-14 DIAGNOSIS — E78.5 DYSLIPIDEMIA: ICD-10-CM

## 2023-07-14 DIAGNOSIS — R73.03 PRE-DIABETES: ICD-10-CM

## 2023-07-14 DIAGNOSIS — M81.0 AGE-RELATED OSTEOPOROSIS WITHOUT CURRENT PATHOLOGICAL FRACTURE: ICD-10-CM

## 2023-07-14 DIAGNOSIS — K21.9 GASTROESOPHAGEAL REFLUX DISEASE WITHOUT ESOPHAGITIS: ICD-10-CM

## 2023-07-14 PROCEDURE — 99214 OFFICE O/P EST MOD 30 MIN: CPT | Performed by: INTERNAL MEDICINE

## 2023-07-14 RX ORDER — NEBIVOLOL 5 MG/1
5 TABLET ORAL EVERY MORNING
Qty: 90 TABLET | Refills: 1 | Status: SHIPPED | OUTPATIENT
Start: 2023-07-14

## 2023-07-14 RX ORDER — POTASSIUM CHLORIDE 750 MG/1
10 TABLET, FILM COATED, EXTENDED RELEASE ORAL EVERY MORNING
Qty: 90 TABLET | Refills: 1 | Status: SHIPPED | OUTPATIENT
Start: 2023-07-14

## 2023-07-14 RX ORDER — LEVOTHYROXINE SODIUM 0.07 MG/1
75 TABLET ORAL EVERY MORNING
Qty: 90 TABLET | Refills: 1 | Status: SHIPPED | OUTPATIENT
Start: 2023-07-14

## 2023-07-14 RX ORDER — PANTOPRAZOLE SODIUM 20 MG/1
40 TABLET, DELAYED RELEASE ORAL EVERY MORNING
Qty: 90 TABLET | Refills: 1 | Status: SHIPPED | OUTPATIENT
Start: 2023-07-14

## 2023-07-14 RX ORDER — AMLODIPINE BESYLATE 5 MG/1
5 TABLET ORAL DAILY
Qty: 90 TABLET | Refills: 1 | Status: SHIPPED | OUTPATIENT
Start: 2023-07-14

## 2023-07-14 RX ORDER — HYDROCHLOROTHIAZIDE 12.5 MG/1
12.5 TABLET ORAL EVERY MORNING
Qty: 90 TABLET | Refills: 1 | Status: SHIPPED | OUTPATIENT
Start: 2023-07-14

## 2023-07-14 NOTE — PROGRESS NOTES
Office Visit Note  23     Clark Alas 80 y.o. female MRN: [id number]  : 1927    Assessment:     1. Primary hypertension  Assessment & Plan: Today's blood pressure is 140/80 currently patient is taking amlodipine 5 mg Bystolic is 5 mg and hydrochlorothiazide 12.5 mg daily we will continue with the same if necessary and adjust the doses of medication on next visit. 2. Primary osteoarthritis involving multiple joints  Assessment & Plan:  Patient with osteoarthritis of multiple joints currently using walker to ambulate just recommend Tylenol as needed for aches and pains in the joints for now. 3. Gastroesophageal reflux disease without esophagitis  Assessment & Plan:  Continue Protonix 20 mg daily for GERD symptoms. When patient tried to discontinue the medication she has been getting the symptoms of GERD back so she is on an daily basis at this time      4. Dyslipidemia  Assessment & Plan:  Patient was on simvastatin 10 mg which appears to have been discontinued while she was in the nursing home we will follow-up with repeat lipid profile. 5. Age-related osteoporosis without current pathological fracture  Assessment & Plan:  Last DEXA scan has revealed osteoporosis she was on Prolia injection but patient has subsequently decided not to have anymore we will monitor it closely. 6. Acquired hypothyroidism      BMI Counseling: Body mass index is 27.92 kg/m². The BMI is above normal. Nutrition recommendations include decreasing portion sizes, decreasing fast food intake, consuming healthier snacks, moderation in carbohydrate intake and reducing intake of cholesterol. Exercise recommendations include moderate physical activity 150 minutes/week. No pharmacotherapy was ordered. Rationale for BMI follow-up plan is due to patient being overweight or obese. Depression Screening and Follow-up Plan: Patient was screened for depression during today's encounter.  They screened negative with a PHQ-2 score of 0. Discussion Summary and Plan: Today's care plan and medications were reviewed with patient in detail and all their questions answered to their satisfaction. Chief Complaint   Patient presents with   • Follow-up      Subjective:  Patient has coming here for evaluation regarding symptoms of hypertension, hypothyroidism, GERD and DJD. She was admitted to the hospital in the month of January with abdominal pain diagnosed with diverticulitis received IV antibiotics and subsequently patient was discharged to nursing home where she stayed there for 6 months. Patient is now currently living with her son. While in the nursing home initially when she was admitted he developed COVID infection and recovered gradually. She was losing hair after the COVID infection which is getting better now. Patient denies symptoms of chest pains palpitation shortness of breath or any other associated symptoms. Patient uses walker for ambulation. Medications reviewed records from the hospital reviewed when she was admitted. Nursing home papers also reviewed      The following portions of the patient's history were reviewed and updated as appropriate: allergies, current medications, past family history, past medical history, past social history, past surgical history and problem list.    Review of Systems   Constitutional: Negative for chills and fever. HENT: Negative for ear pain and sore throat. Eyes: Negative for pain and visual disturbance. Respiratory: Negative for cough and shortness of breath. Cardiovascular: Negative for chest pain and palpitations. Gastrointestinal: Negative for abdominal pain and vomiting. Genitourinary: Negative for dysuria and hematuria. Musculoskeletal: Positive for arthralgias and back pain. Skin: Negative for color change and rash. Neurological: Negative for seizures and syncope. All other systems reviewed and are negative.         Historical Information Patient Active Problem List   Diagnosis   • Primary hypertension   • Dyslipidemia   • Acquired hypothyroidism   • Gastroesophageal reflux disease without esophagitis   • Osteoarthritis of multiple joints   • Age-related osteoporosis without current pathological fracture   • Anemia     Past Medical History:   Diagnosis Date   • Acid reflux    • Arthritis    • DDD (degenerative disc disease), lumbar    • Disease of thyroid gland     hypo   • DJD (degenerative joint disease)    • History of transfusion     many years ago-1940s (after fetal demise-very early pregnancy)   • Hyperlipidemia    • Hypertension    • Osteoporosis    • Restless leg      Past Surgical History:   Procedure Laterality Date   • APPENDECTOMY     • BREAST SURGERY Left     biopsy   • CATARACT EXTRACTION W/ INTRAOCULAR LENS IMPLANT Left 10/24/2016    Procedure: EXTRACTION EXTRACAPSULAR CATARACT PHACO INTRAOCULAR LENS (IOL); Surgeon: Stevie Byers MD;  Location: [facility] MAIN OR;  Service:    • DILATION AND CURETTAGE OF UTERUS         • DXA PROCEDURE (HISTORICAL)  2020   • HEMORRHOID SURGERY     • HYSTERECTOMY  1950    with right oophorectomy   • ID XCAPSL CTRC RMVL INSJ IO LENS PROSTH W/O ECP Right 2016    Procedure: EXTRACTION EXTRACAPSULAR CATARACT PHACO INTRAOCULAR LENS (IOL);   Surgeon: Stevie Byers MD;  Location: [facility] MAIN OR;  Service: Ophthalmology   • SKIN BIOPSY      exc of the [id number]   • TOE SURGERY Right     great toe removal of bone, and between the toes cyst removal   • TONSILLECTOMY     • TUBAL LIGATION       Social History     Substance and Sexual Activity   Alcohol Use Never     Social History     Substance and Sexual Activity   Drug Use Never     Social History     Tobacco Use   Smoking Status Former   • Types: Cigarettes   • Quit date:    • Years since quittin.5   Smokeless Tobacco Never   Tobacco Comments    social     Family History   Problem Relation Age of Onset   • Heart disease Father 47        MI     Health Maintenance Due   Topic   • Medicare Annual Wellness Visit (AWV)    • Pneumococcal Vaccine: 65+ Years (1 - PCV)   • BMI: Followup Plan    • COVID-19 Vaccine (4 - Moderna series)   • Influenza Vaccine (1)      Meds/Allergies       Current Outpatient Medications:   •  amLODIPine (NORVASC) 5 mg tablet, Take 5 mg by mouth in the morning, Disp: , Rfl:   •  hydrochlorothiazide (HYDRODIURIL) 12.5 mg tablet, Take 12.5 mg by mouth every morning, Disp: , Rfl:   •  levothyroxine 75 mcg tablet, Take 75 mcg by mouth every morning Tues,thurs,fri, sat, sun, Disp: , Rfl:   •  nebivolol (BYSTOLIC) 5 mg tablet, Take 1 tablet (5 mg total) by mouth every morning Do not start before January 21, 2023., Disp: 30 tablet, Rfl: 1  •  Omega-3 Fatty Acids (FISH OIL) 1200 MG CAPS, Take by mouth 2 (two) times a day, Disp: , Rfl:   •  pantoprazole (PROTONIX) 40 mg tablet, Take 1 tablet (40 mg total) by mouth every morning, Disp: 90 tablet, Rfl: 1  •  potassium chloride (K-DUR) 10 mEq tablet, Take 10 mEq by mouth every morning, Disp: , Rfl:   •  simvastatin (ZOCOR) 10 mg tablet, Take 10 mg by mouth daily at bedtime, Disp: , Rfl:       Objective:    Vitals:   /80 (BP Location: Right arm, Patient Position: Sitting, Cuff Size: Standard)   Pulse 73   Ht 4' 9" (1.448 m)   Wt 58.5 kg (129 lb)   SpO2 97%   BMI 27.92 kg/m²   Body mass index is 27.92 kg/m². Vitals:    07/14/23 1204   Weight: 58.5 kg (129 lb)       Physical Exam  Vitals and nursing note reviewed. Constitutional:       Appearance: Normal appearance. Cardiovascular:      Rate and Rhythm: Normal rate and regular rhythm. Heart sounds: Normal heart sounds. Pulmonary:      Effort: Pulmonary effort is normal.      Breath sounds: Normal breath sounds. Abdominal:      General: Abdomen is flat. Palpations: Abdomen is soft. Musculoskeletal:      Cervical back: Normal range of motion and neck supple. Right lower leg: No edema. Left lower leg: No edema. Skin:     General: Skin is warm and dry. Neurological:      Mental Status: She is alert and oriented to person, place, and time. Lab Review   No visits with results within 2 Month(s) from this visit.    Latest known visit with results is:   Admission on 01/14/2023, Discharged on 01/20/2023   Component Date Value Ref Range Status   • WBC 01/14/2023 10.47 (H)  4.31 - 10.16 Thousand/uL Final   • RBC 01/14/2023 4.13  3.81 - 5.12 Million/uL Final   • Hemoglobin 01/14/2023 13.1  11.5 - 15.4 g/dL Final   • Hematocrit 01/14/2023 39.3  34.8 - 46.1 % Final   • MCV 01/14/2023 95  82 - 98 fL Final   • MCH 01/14/2023 31.7  26.8 - 34.3 pg Final   • MCHC 01/14/2023 33.3  31.4 - 37.4 g/dL Final   • RDW 01/14/2023 13.8  11.6 - 15.1 % Final   • MPV 01/14/2023 12.1  8.9 - 12.7 fL Final   • Platelets 25/60/4511 175  149 - 390 Thousands/uL Final   • nRBC 01/14/2023 0  /100 WBCs Final   • Neutrophils Relative 01/14/2023 84 (H)  43 - 75 % Final   • Immat GRANS % 01/14/2023 1  0 - 2 % Final   • Lymphocytes Relative 01/14/2023 10 (L)  14 - 44 % Final   • Monocytes Relative 01/14/2023 4  4 - 12 % Final   • Eosinophils Relative 01/14/2023 0  0 - 6 % Final   • Basophils Relative 01/14/2023 1  0 - 1 % Final   • Neutrophils Absolute 01/14/2023 8.80 (H)  1.85 - 7.62 Thousands/µL Final   • Immature Grans Absolute 01/14/2023 0.11  0.00 - 0.20 Thousand/uL Final   • Lymphocytes Absolute 01/14/2023 1.05  0.60 - 4.47 Thousands/µL Final   • Monocytes Absolute 01/14/2023 0.46  0.17 - 1.22 Thousand/µL Final   • Eosinophils Absolute 01/14/2023 0.00  0.00 - 0.61 Thousand/µL Final   • Basophils Absolute 01/14/2023 0.05  0.00 - 0.10 Thousands/µL Final   • Sodium 01/14/2023 139  135 - 147 mmol/L Final   • Potassium 01/14/2023 3.3 (L)  3.5 - 5.3 mmol/L Final   • Chloride 01/14/2023 98  96 - 108 mmol/L Final   • CO2 01/14/2023 30  21 - 32 mmol/L Final   • ANION GAP 01/14/2023 11  4 - 13 mmol/L Final   • BUN 01/14/2023 26 (H) 5 - 25 mg/dL Final   • Creatinine 01/14/2023 1.13  0.60 - 1.30 mg/dL Final    Standardized to IDMS reference method   • Glucose 01/14/2023 189 (H)  65 - 140 mg/dL Final    If the patient is fasting, the ADA then defines impaired fasting glucose as > 100 mg/dL and diabetes as > or equal to 123 mg/dL. Specimen collection should occur prior to Sulfasalazine administration due to the potential for falsely depressed results. Specimen collection should occur prior to Sulfapyridine administration due to the potential for falsely elevated results. • Calcium 01/14/2023 9.7  8.3 - 10.1 mg/dL Final   • AST 01/14/2023 23  5 - 45 U/L Final    Specimen collection should occur prior to Sulfasalazine administration due to the potential for falsely depressed results. • ALT 01/14/2023 19  12 - 78 U/L Final    Specimen collection should occur prior to Sulfasalazine administration due to the potential for falsely depressed results. • Alkaline Phosphatase 01/14/2023 55  46 - 116 U/L Final   • Total Protein 01/14/2023 7.8  6.4 - 8.4 g/dL Final   • Albumin 01/14/2023 3.7  3.5 - 5.0 g/dL Final   • Total Bilirubin 01/14/2023 0.74  0.20 - 1.00 mg/dL Final    Use of this assay is not recommended for patients undergoing treatment with eltrombopag due to the potential for falsely elevated results. • eGFR 01/14/2023 41  ml/min/1.73sq m Final   • Magnesium 01/14/2023 1.8  1.6 - 2.6 mg/dL Final   • Lipase 01/14/2023 51 (L)  73 - 393 u/L Final   • hs TnI 0hr 01/14/2023 4  "Refer to ACS Flowchart"- see link ng/L Final    Comment:                                              Initial (time 0) result  If >=50 ng/L, Myocardial injury suggested ;  Type of myocardial injury and treatment strategy  to be determined. If 5-49 ng/L, a delta result at 2 hours and or 4 hours will be needed to further evaluate. If <4 ng/L, and chest pain has been >3 hours since onset, patient may qualify for discharge based on the HEART score in the ED.   If <5 ng/L and <3hours since onset of chest pain, a delta result at 2 hours will be needed to further evaluate. HS Troponin 99th Percentile URL of a Health Population=12 ng/L with a 95% Confidence Interval of 8-18 ng/L. Second Troponin (time 2 hours)  If calculated delta >= 20 ng/L,  Myocardial injury suggested ; Type of myocardial injury and treatment strategy to be determined. If 5-49 ng/L and the calculated delta is 5-19 ng/L, consult medical service for evaluation. Continue evaluation for ischemia on ecg and other possible etiology and repeat hs troponin at 4 hours. If delta                            is <5 ng/L at 2 hours, consider discharge based on risk stratification via the HEART score (if in ED), or HIREN risk score in IP/Observation. HS Troponin 99th Percentile URL of a Health Population=12 ng/L with a 95% Confidence Interval of 8-18 ng/L.   • LACTIC ACID 01/14/2023 2.0  0.5 - 2.0 mmol/L Final   • Blood Culture 01/14/2023 No Growth After 5 Days. Final   • Blood Culture 01/14/2023 No Growth After 5 Days.    Final   • SARS-CoV-2 01/14/2023 Negative  Negative Final   • INFLUENZA A PCR 01/14/2023 Negative  Negative Final   • INFLUENZA B PCR 01/14/2023 Negative  Negative Final   • RSV PCR 01/14/2023 Negative  Negative Final   • Color, UA 01/15/2023 Yellow   Final   • Clarity, UA 01/15/2023 Clear   Final   • Specific Gravity, UA 01/15/2023 1.015  1.000 - 1.030 Final   • pH, UA 01/15/2023 6.0  5.0, 5.5, 6.0, 6.5, 7.0, 7.5, 8.0, 8.5, 9.0 Final   • Leukocytes, UA 01/15/2023 Negative  Negative Final   • Nitrite, UA 01/15/2023 Negative  Negative Final   • Protein, UA 01/15/2023 Trace (A)  Negative mg/dl Final   • Glucose, UA 01/15/2023 Negative  Negative mg/dl Final   • Ketones, UA 01/15/2023 Negative  Negative mg/dl Final   • Urobilinogen, UA 01/15/2023 0.2  0.2, 1.0 E.U./dl E.U./dl Final   • Bilirubin, UA 01/15/2023 Negative  Negative Final   • Occult Blood, UA 01/15/2023 Trace-Intact (A)  Negative Final   • WBC 01/15/2023 7.30  4.31 - 10.16 Thousand/uL Final   • RBC 01/15/2023 3.40 (L)  3.81 - 5.12 Million/uL Final   • Hemoglobin 01/15/2023 10.7 (L)  11.5 - 15.4 g/dL Final   • Hematocrit 01/15/2023 32.1 (L)  34.8 - 46.1 % Final   • MCV 01/15/2023 94  82 - 98 fL Final   • MCH 01/15/2023 31.5  26.8 - 34.3 pg Final   • MCHC 01/15/2023 33.3  31.4 - 37.4 g/dL Final   • RDW 01/15/2023 13.8  11.6 - 15.1 % Final   • Platelets 27/84/2585 157  149 - 390 Thousands/uL Final   • MPV 01/15/2023 11.9  8.9 - 12.7 fL Final   • Sodium 01/15/2023 142  135 - 147 mmol/L Final   • Potassium 01/15/2023 3.4 (L)  3.5 - 5.3 mmol/L Final   • Chloride 01/15/2023 102  96 - 108 mmol/L Final   • CO2 01/15/2023 32  21 - 32 mmol/L Final   • ANION GAP 01/15/2023 8  4 - 13 mmol/L Final   • BUN 01/15/2023 14  5 - 25 mg/dL Final   • Creatinine 01/15/2023 1.01  0.60 - 1.30 mg/dL Final    Standardized to IDMS reference method   • Glucose 01/15/2023 98  65 - 140 mg/dL Final    If the patient is fasting, the ADA then defines impaired fasting glucose as > 100 mg/dL and diabetes as > or equal to 123 mg/dL. Specimen collection should occur prior to Sulfasalazine administration due to the potential for falsely depressed results. Specimen collection should occur prior to Sulfapyridine administration due to the potential for falsely elevated results. • Calcium 01/15/2023 8.7  8.3 - 10.1 mg/dL Final   • eGFR 01/15/2023 47  ml/min/1.73sq m Final   • Magnesium 01/15/2023 1.7  1.6 - 2.6 mg/dL Final   • Phosphorus 01/15/2023 3.2  2.3 - 4.1 mg/dL Final   • Total Bilirubin 01/15/2023 0.70  0.20 - 1.00 mg/dL Final    Use of this assay is not recommended for patients undergoing treatment with eltrombopag due to the potential for falsely elevated results.    • Bilirubin, Direct 01/15/2023 0.19  0.00 - 0.20 mg/dL Final   • Alkaline Phosphatase 01/15/2023 40 (L)  46 - 116 U/L Final   • AST 01/15/2023 18  5 - 45 U/L Final    Specimen collection should occur prior to Sulfasalazine administration due to the potential for falsely depressed results. • ALT 01/15/2023 14  12 - 78 U/L Final    Specimen collection should occur prior to Sulfasalazine administration due to the potential for falsely depressed results. • Total Protein 01/15/2023 5.8 (L)  6.4 - 8.4 g/dL Final   • Albumin 01/15/2023 2.6 (L)  3.5 - 5.0 g/dL Final   • RBC, UA 01/15/2023 0-1  None Seen, 0-1, 1-2, 2-4, 0-5 /hpf Final   • WBC, UA 01/15/2023 0-1  None Seen, 0-1, 1-2, 0-5, 2-4 /hpf Final   • Epithelial Cells 01/15/2023 Occasional  None Seen, Occasional /hpf Final   • Bacteria, UA 01/15/2023 Moderate (A)  None Seen, Occasional /hpf Final   • MUCUS THREADS 01/15/2023 Occasional (A)  None Seen Final   • WBC 01/16/2023 8.38  4.31 - 10.16 Thousand/uL Final   • RBC 01/16/2023 3.93  3.81 - 5.12 Million/uL Final   • Hemoglobin 01/16/2023 12.3  11.5 - 15.4 g/dL Final   • Hematocrit 01/16/2023 36.5  34.8 - 46.1 % Final   • MCV 01/16/2023 93  82 - 98 fL Final   • MCH 01/16/2023 31.3  26.8 - 34.3 pg Final   • MCHC 01/16/2023 33.7  31.4 - 37.4 g/dL Final   • RDW 01/16/2023 13.8  11.6 - 15.1 % Final   • Platelets 95/25/2006 205  149 - 390 Thousands/uL Final   • MPV 01/16/2023 11.6  8.9 - 12.7 fL Final   • Sodium 01/16/2023 136  135 - 147 mmol/L Final   • Potassium 01/16/2023 2.9 (L)  3.5 - 5.3 mmol/L Final   • Chloride 01/16/2023 98  96 - 108 mmol/L Final   • CO2 01/16/2023 28  21 - 32 mmol/L Final   • ANION GAP 01/16/2023 10  4 - 13 mmol/L Final   • BUN 01/16/2023 15  5 - 25 mg/dL Final   • Creatinine 01/16/2023 0.69  0.60 - 1.30 mg/dL Final    Standardized to IDMS reference method   • Glucose 01/16/2023 125  65 - 140 mg/dL Final    If the patient is fasting, the ADA then defines impaired fasting glucose as > 100 mg/dL and diabetes as > or equal to 123 mg/dL. Specimen collection should occur prior to Sulfasalazine administration due to the potential for falsely depressed results.  Specimen collection should occur prior to Sulfapyridine administration due to the potential for falsely elevated results. • Calcium 01/16/2023 9.0  8.3 - 10.1 mg/dL Final   • eGFR 01/16/2023 74  ml/min/1.73sq m Final   • Magnesium 01/16/2023 1.8  1.6 - 2.6 mg/dL Final   • SARS-CoV-2 01/16/2023 Negative  Negative Final   • INFLUENZA A PCR 01/16/2023 Negative  Negative Final   • INFLUENZA B PCR 01/16/2023 Negative  Negative Final   • RSV PCR 01/16/2023 Negative  Negative Final   • WBC 01/17/2023 8.87  4.31 - 10.16 Thousand/uL Final   • RBC 01/17/2023 4.38  3.81 - 5.12 Million/uL Final   • Hemoglobin 01/17/2023 13.8  11.5 - 15.4 g/dL Final   • Hematocrit 01/17/2023 40.3  34.8 - 46.1 % Final   • MCV 01/17/2023 92  82 - 98 fL Final   • MCH 01/17/2023 31.5  26.8 - 34.3 pg Final   • MCHC 01/17/2023 34.2  31.4 - 37.4 g/dL Final   • RDW 01/17/2023 13.5  11.6 - 15.1 % Final   • Platelets 38/22/2900 248  149 - 390 Thousands/uL Final   • MPV 01/17/2023 10.9  8.9 - 12.7 fL Final   • Sodium 01/17/2023 136  135 - 147 mmol/L Final   • Potassium 01/17/2023 3.7  3.5 - 5.3 mmol/L Final   • Chloride 01/17/2023 98  96 - 108 mmol/L Final   • CO2 01/17/2023 26  21 - 32 mmol/L Final   • ANION GAP 01/17/2023 12  4 - 13 mmol/L Final   • BUN 01/17/2023 14  5 - 25 mg/dL Final   • Creatinine 01/17/2023 0.67  0.60 - 1.30 mg/dL Final    Standardized to IDMS reference method   • Glucose 01/17/2023 106  65 - 140 mg/dL Final    If the patient is fasting, the ADA then defines impaired fasting glucose as > 100 mg/dL and diabetes as > or equal to 123 mg/dL. Specimen collection should occur prior to Sulfasalazine administration due to the potential for falsely depressed results. Specimen collection should occur prior to Sulfapyridine administration due to the potential for falsely elevated results.    • Calcium 01/17/2023 9.0  8.3 - 10.1 mg/dL Final   • eGFR 01/17/2023 74  ml/min/1.73sq m Final   • Ventricular Rate 01/14/2023 69  BPM Final   • Atrial Rate 01/14/2023 69  BPM Final   • IA Interval 01/14/2023 238  ms Final   • QRSD Interval 01/14/2023 100  ms Final   • QT Interval 01/14/2023 418  ms Final   • QTC Interval 01/14/2023 447  ms Final   • P Axis 01/14/2023 82  degrees Final   • QRS Axis 01/14/2023 -9  degrees Final   • T Wave Axis 01/14/2023 73  degrees Final         Geovanna Bruno MD        "This note has been constructed using a voice recognition system. Therefore there may be syntax, spelling, and/or grammatical errors.  Please call if you have any questions. "

## 2023-07-14 NOTE — ASSESSMENT & PLAN NOTE
Patient with osteoarthritis of multiple joints currently using walker to ambulate just recommend Tylenol as needed for aches and pains in the joints for now.

## 2023-07-14 NOTE — ASSESSMENT & PLAN NOTE
Continue Protonix 20 mg daily for GERD symptoms.   When patient tried to discontinue the medication she has been getting the symptoms of GERD back so she is on an daily basis at this time

## 2023-07-14 NOTE — ASSESSMENT & PLAN NOTE
Today's blood pressure is 140/80 currently patient is taking amlodipine 5 mg Bystolic is 5 mg and hydrochlorothiazide 12.5 mg daily we will continue with the same if necessary and adjust the doses of medication on next visit.

## 2023-07-14 NOTE — ASSESSMENT & PLAN NOTE
Last DEXA scan has revealed osteoporosis she was on Prolia injection but patient has subsequently decided not to have anymore we will monitor it closely.

## 2023-07-14 NOTE — ASSESSMENT & PLAN NOTE
Patient was on simvastatin 10 mg which appears to have been discontinued while she was in the nursing home we will follow-up with repeat lipid profile.

## 2023-07-17 ENCOUNTER — APPOINTMENT (OUTPATIENT)
Dept: LAB | Facility: CLINIC | Age: 88
End: 2023-07-17
Payer: MEDICARE

## 2023-07-17 DIAGNOSIS — E03.9 ACQUIRED HYPOTHYROIDISM: ICD-10-CM

## 2023-07-17 DIAGNOSIS — Z13.0 SCREENING FOR DEFICIENCY ANEMIA: ICD-10-CM

## 2023-07-17 DIAGNOSIS — E78.5 DYSLIPIDEMIA: ICD-10-CM

## 2023-07-17 DIAGNOSIS — R73.03 PRE-DIABETES: ICD-10-CM

## 2023-07-17 LAB
ALBUMIN SERPL BCP-MCNC: 3.8 G/DL (ref 3.5–5)
ALP SERPL-CCNC: 52 U/L (ref 46–116)
ALT SERPL W P-5'-P-CCNC: 19 U/L (ref 12–78)
ANION GAP SERPL CALCULATED.3IONS-SCNC: 0 MMOL/L
AST SERPL W P-5'-P-CCNC: 19 U/L (ref 5–45)
BACTERIA UR QL AUTO: ABNORMAL /HPF
BASOPHILS # BLD AUTO: 0.01 THOUSANDS/ÂΜL (ref 0–0.1)
BASOPHILS NFR BLD AUTO: 0 % (ref 0–1)
BILIRUB SERPL-MCNC: 0.46 MG/DL (ref 0.2–1)
BILIRUB UR QL STRIP: NEGATIVE
BUN SERPL-MCNC: 28 MG/DL (ref 5–25)
CALCIUM SERPL-MCNC: 9.9 MG/DL (ref 8.3–10.1)
CHLORIDE SERPL-SCNC: 108 MMOL/L (ref 96–108)
CHOLEST SERPL-MCNC: 208 MG/DL
CLARITY UR: CLEAR
CO2 SERPL-SCNC: 31 MMOL/L (ref 21–32)
COLOR UR: ABNORMAL
CREAT SERPL-MCNC: 1.04 MG/DL (ref 0.6–1.3)
EOSINOPHIL # BLD AUTO: 0.12 THOUSAND/ÂΜL (ref 0–0.61)
EOSINOPHIL NFR BLD AUTO: 2 % (ref 0–6)
ERYTHROCYTE [DISTWIDTH] IN BLOOD BY AUTOMATED COUNT: 14.6 % (ref 11.6–15.1)
GFR SERPL CREATININE-BSD FRML MDRD: 45 ML/MIN/1.73SQ M
GLUCOSE P FAST SERPL-MCNC: 98 MG/DL (ref 65–99)
GLUCOSE UR STRIP-MCNC: NEGATIVE MG/DL
HCT VFR BLD AUTO: 37.8 % (ref 34.8–46.1)
HDLC SERPL-MCNC: 58 MG/DL
HGB BLD-MCNC: 12.3 G/DL (ref 11.5–15.4)
HGB UR QL STRIP.AUTO: NEGATIVE
IMM GRANULOCYTES # BLD AUTO: 0.03 THOUSAND/UL (ref 0–0.2)
IMM GRANULOCYTES NFR BLD AUTO: 1 % (ref 0–2)
KETONES UR STRIP-MCNC: NEGATIVE MG/DL
LDLC SERPL CALC-MCNC: 117 MG/DL (ref 0–100)
LEUKOCYTE ESTERASE UR QL STRIP: ABNORMAL
LYMPHOCYTES # BLD AUTO: 2.17 THOUSANDS/ÂΜL (ref 0.6–4.47)
LYMPHOCYTES NFR BLD AUTO: 41 % (ref 14–44)
MCH RBC QN AUTO: 31.2 PG (ref 26.8–34.3)
MCHC RBC AUTO-ENTMCNC: 32.5 G/DL (ref 31.4–37.4)
MCV RBC AUTO: 96 FL (ref 82–98)
MONOCYTES # BLD AUTO: 0.35 THOUSAND/ÂΜL (ref 0.17–1.22)
MONOCYTES NFR BLD AUTO: 7 % (ref 4–12)
MUCOUS THREADS UR QL AUTO: ABNORMAL
NEUTROPHILS # BLD AUTO: 2.63 THOUSANDS/ÂΜL (ref 1.85–7.62)
NEUTS SEG NFR BLD AUTO: 49 % (ref 43–75)
NITRITE UR QL STRIP: NEGATIVE
NON-SQ EPI CELLS URNS QL MICRO: ABNORMAL /HPF
NONHDLC SERPL-MCNC: 150 MG/DL
NRBC BLD AUTO-RTO: 0 /100 WBCS
PH UR STRIP.AUTO: 5 [PH]
PLATELET # BLD AUTO: 197 THOUSANDS/UL (ref 149–390)
PMV BLD AUTO: 12.8 FL (ref 8.9–12.7)
POTASSIUM SERPL-SCNC: 5.1 MMOL/L (ref 3.5–5.3)
PROT SERPL-MCNC: 7.4 G/DL (ref 6.4–8.4)
PROT UR STRIP-MCNC: NEGATIVE MG/DL
RBC # BLD AUTO: 3.94 MILLION/UL (ref 3.81–5.12)
RBC #/AREA URNS AUTO: ABNORMAL /HPF
SODIUM SERPL-SCNC: 139 MMOL/L (ref 135–147)
SP GR UR STRIP.AUTO: 1.02 (ref 1–1.03)
TRIGL SERPL-MCNC: 163 MG/DL
TSH SERPL DL<=0.05 MIU/L-ACNC: 5.47 UIU/ML (ref 0.45–4.5)
UROBILINOGEN UR STRIP-ACNC: <2 MG/DL
WBC # BLD AUTO: 5.31 THOUSAND/UL (ref 4.31–10.16)
WBC #/AREA URNS AUTO: ABNORMAL /HPF

## 2023-07-17 PROCEDURE — 80061 LIPID PANEL: CPT

## 2023-07-17 PROCEDURE — 36415 COLL VENOUS BLD VENIPUNCTURE: CPT

## 2023-07-17 PROCEDURE — 85025 COMPLETE CBC W/AUTO DIFF WBC: CPT

## 2023-07-17 PROCEDURE — 80053 COMPREHEN METABOLIC PANEL: CPT

## 2023-07-17 PROCEDURE — 84439 ASSAY OF FREE THYROXINE: CPT

## 2023-07-17 PROCEDURE — 83036 HEMOGLOBIN GLYCOSYLATED A1C: CPT

## 2023-07-17 PROCEDURE — 81001 URINALYSIS AUTO W/SCOPE: CPT

## 2023-07-17 PROCEDURE — 84443 ASSAY THYROID STIM HORMONE: CPT

## 2023-07-18 DIAGNOSIS — E03.9 ACQUIRED HYPOTHYROIDISM: Primary | ICD-10-CM

## 2023-07-18 DIAGNOSIS — E78.5 DYSLIPIDEMIA: ICD-10-CM

## 2023-07-18 LAB
EST. AVERAGE GLUCOSE BLD GHB EST-MCNC: 100 MG/DL
HBA1C MFR BLD: 5.1 %
T4 FREE SERPL-MCNC: 0.69 NG/DL (ref 0.61–1.12)

## 2023-07-24 DIAGNOSIS — E03.9 ACQUIRED HYPOTHYROIDISM: ICD-10-CM

## 2023-07-24 RX ORDER — LEVOTHYROXINE SODIUM 0.07 MG/1
75 TABLET ORAL EVERY MORNING
Qty: 90 TABLET | Refills: 1 | OUTPATIENT
Start: 2023-07-24

## 2023-09-07 ENCOUNTER — HOSPITAL ENCOUNTER (EMERGENCY)
Facility: HOSPITAL | Age: 88
Discharge: HOME | End: 2023-09-07
Attending: EMERGENCY MEDICINE
Payer: MEDICARE

## 2023-09-07 ENCOUNTER — APPOINTMENT (EMERGENCY)
Dept: RADIOLOGY | Facility: HOSPITAL | Age: 88
End: 2023-09-07
Payer: MEDICARE

## 2023-09-07 VITALS
TEMPERATURE: 97.4 F | BODY MASS INDEX: 29.01 KG/M2 | WEIGHT: 134.48 LBS | SYSTOLIC BLOOD PRESSURE: 150 MMHG | HEART RATE: 66 BPM | RESPIRATION RATE: 16 BRPM | OXYGEN SATURATION: 94 % | DIASTOLIC BLOOD PRESSURE: 63 MMHG | HEIGHT: 57 IN

## 2023-09-07 DIAGNOSIS — M25.551 RIGHT HIP PAIN: Primary | ICD-10-CM

## 2023-09-07 PROCEDURE — 99283 EMERGENCY DEPT VISIT LOW MDM: CPT

## 2023-09-07 PROCEDURE — 99284 EMERGENCY DEPT VISIT MOD MDM: CPT | Performed by: EMERGENCY MEDICINE

## 2023-09-07 PROCEDURE — 73502 X-RAY EXAM HIP UNI 2-3 VIEWS: CPT

## 2023-09-07 RX ORDER — IBUPROFEN 600 MG/1
600 TABLET ORAL ONCE
Status: COMPLETED | OUTPATIENT
Start: 2023-09-07 | End: 2023-09-07

## 2023-09-07 RX ORDER — ACETAMINOPHEN 325 MG/1
975 TABLET ORAL ONCE
Status: COMPLETED | OUTPATIENT
Start: 2023-09-07 | End: 2023-09-07

## 2023-09-07 RX ORDER — LIDOCAINE 50 MG/G
1 PATCH TOPICAL DAILY
Qty: 15 PATCH | Refills: 0 | Status: SHIPPED | OUTPATIENT
Start: 2023-09-07

## 2023-09-07 RX ORDER — LIDOCAINE 50 MG/G
1 PATCH TOPICAL ONCE
Status: DISCONTINUED | OUTPATIENT
Start: 2023-09-07 | End: 2023-09-07 | Stop reason: HOSPADM

## 2023-09-07 RX ADMIN — ACETAMINOPHEN 975 MG: 325 TABLET ORAL at 02:07

## 2023-09-07 RX ADMIN — LIDOCAINE 5% 1 PATCH: 700 PATCH TOPICAL at 02:07

## 2023-09-07 RX ADMIN — IBUPROFEN 600 MG: 600 TABLET, FILM COATED ORAL at 02:07

## 2023-09-07 NOTE — DISCHARGE INSTRUCTIONS
The Lidoderm patch can stay on your skin for 12 hours but then he needs to be off of your skin for 12 hours. I sent a prescription to the pharmacy. They are also over-the-counter but sometimes this way it is covered by insurance. Please continue taking for 400 mg of ibuprofen and 650 mg of Tylenol every 6 hours for your pain. Follow-up with your primary care doctor. I also placed a referral for [facility] which may do physical therapy with you should this pain continue. Return to the ER if you develop any fevers, pain with urination or abdominal pain.

## 2023-09-07 NOTE — ED PROVIDER NOTES
History  Chief Complaint   Patient presents with   • Back Pain     Patient complains of right lower back pain that started when she woke to go to the bathroom. Radiates down right leg. No falls. This is a 80-year-old female past medical history significant for hyperlipidemia as well as degenerative joint disease presenting to the ED today for right-sided hip pain. Patient is denying any falls. She lives at home with her family. We will also saying that she did not have any falls. She states that she started having right-sided hip pain about 2 days ago. She used Tylenol as well as a Salonpas patch at home which says that initially she was getting some relief from it however she has not had any relief today. She says that the pain started when she was getting out of bed and tried to go use the bathroom. She has pain when she is sitting on the toilet from it. Denies any fevers or chills. Denies any abdominal pain. No chest pain or shortness of breath. She says that she has had something similar before in the past.          Prior to Admission Medications   Prescriptions Last Dose Informant Patient Reported? Taking?    Omega-3 Fatty Acids (FISH OIL) 1200 MG CAPS   Yes No   Sig: Take by mouth 2 (two) times a day   amLODIPine (NORVASC) 5 mg tablet   No No   Sig: Take 1 tablet (5 mg total) by mouth in the morning   hydrochlorothiazide (HYDRODIURIL) 12.5 mg tablet   No No   Sig: Take 1 tablet (12.5 mg total) by mouth every morning   levothyroxine 75 mcg tablet   No No   Sig: Take 1 tablet (75 mcg total) by mouth every morning Tues,thurs,fri, sat, sun   nebivolol (BYSTOLIC) 5 mg tablet   No No   Sig: Take 1 tablet (5 mg total) by mouth every morning   pantoprazole (PROTONIX) 20 mg tablet   No No   Sig: Take 2 tablets (40 mg total) by mouth every morning   potassium chloride (Klor-Con) 10 mEq tablet   No No   Sig: Take 1 tablet (10 mEq total) by mouth every morning      Facility-Administered Medications: None Past Medical History:   Diagnosis Date   • Acid reflux    • Arthritis    • DDD (degenerative disc disease), lumbar    • Disease of thyroid gland     hypo   • DJD (degenerative joint disease)    • History of transfusion     many years ago-1940's (after fetal demise-very early pregnancy)   • Hyperlipidemia    • Hypertension    • Osteoporosis    • Restless leg        Past Surgical History:   Procedure Laterality Date   • APPENDECTOMY     • BREAST SURGERY Left     biopsy   • CATARACT EXTRACTION W/ INTRAOCULAR LENS IMPLANT Left 10/24/2016    Procedure: EXTRACTION EXTRACAPSULAR CATARACT PHACO INTRAOCULAR LENS (IOL); Surgeon: Patricia Quinteros MD;  Location: [facility] MAIN OR;  Service:    • DILATION AND CURETTAGE OF UTERUS         • DXA PROCEDURE (HISTORICAL)  2020   • HEMORRHOID SURGERY     • HYSTERECTOMY  1950    with right oophorectomy   • NM XCAPSL CTRC RMVL INSJ IO LENS PROSTH W/O ECP Right 2016    Procedure: EXTRACTION EXTRACAPSULAR CATARACT PHACO INTRAOCULAR LENS (IOL); Surgeon: Patricia Quinteros MD;  Location: [facility] MAIN OR;  Service: Ophthalmology   • SKIN BIOPSY      exc of the [id number]   • TOE SURGERY Right     great toe removal of bone, and between the toes cyst removal   • TONSILLECTOMY     • TUBAL LIGATION         Family History   Problem Relation Age of Onset   • Heart disease Father 47        MI     I have reviewed and agree with the history as documented. E-Cigarette/Vaping   • E-Cigarette Use Never User      E-Cigarette/Vaping Substances     Social History     Tobacco Use   • Smoking status: Former     Types: Cigarettes     Quit date:      Years since quittin.7   • Smokeless tobacco: Never   • Tobacco comments:     social   Vaping Use   • Vaping Use: Never used   Substance Use Topics   • Alcohol use: Never   • Drug use: Never       Review of Systems   Constitutional: Negative for chills and fever. HENT: Negative for hearing loss.     Eyes: Negative for visual disturbance. Respiratory: Negative for shortness of breath. Cardiovascular: Negative for chest pain. Gastrointestinal: Negative for abdominal pain, constipation, diarrhea, nausea and vomiting. Genitourinary: Negative for difficulty urinating. Musculoskeletal: Negative for myalgias. Skin: Negative for color change. Neurological: Negative for dizziness and headaches. Psychiatric/Behavioral: Negative for agitation. All other systems reviewed and are negative. Physical Exam  Physical Exam  Vitals and nursing note reviewed. Constitutional:       General: She is not in acute distress. Appearance: Normal appearance. She is well-developed. She is not ill-appearing. HENT:      Head: Normocephalic and atraumatic. Right Ear: External ear normal.      Left Ear: External ear normal.      Nose: Nose normal.      Mouth/Throat:      Mouth: Mucous membranes are moist.      Pharynx: Oropharynx is clear. No oropharyngeal exudate. Eyes:      General:         Right eye: No discharge. Left eye: No discharge. Extraocular Movements: Extraocular movements intact. Conjunctiva/sclera: Conjunctivae normal.      Pupils: Pupils are equal, round, and reactive to light. Cardiovascular:      Rate and Rhythm: Normal rate and regular rhythm. Heart sounds: Normal heart sounds. No murmur heard. No friction rub. No gallop. Pulmonary:      Effort: Pulmonary effort is normal. No respiratory distress. Breath sounds: Normal breath sounds. No stridor. No wheezing. Abdominal:      General: Bowel sounds are normal. There is no distension. Palpations: Abdomen is soft. Tenderness: There is no abdominal tenderness. Comments: Abdomen soft nontender. No guarding. No flank ecchymosis. Musculoskeletal:         General: No swelling. Normal range of motion. Cervical back: Normal range of motion and neck supple. No rigidity.       Comments: Patient has tenderness to palpation over the right lower lumbar region. She has a positive straight leg raise on the right. No pain with passive range of motion of the left lower extremity. No pain with passive range of motion of the upper extremity. Skin:     General: Skin is warm and dry. Capillary Refill: Capillary refill takes less than 2 seconds. Neurological:      General: No focal deficit present. Mental Status: She is alert and oriented to person, place, and time. Mental status is at baseline. Psychiatric:         Mood and Affect: Mood normal.         Behavior: Behavior normal.         Vital Signs  ED Triage Vitals [09/07/23 0116]   Temperature Pulse Respirations Blood Pressure SpO2   (!) 97.4 °F (36.3 °C) 69 17 158/69 98 %      Temp Source Heart Rate Source Patient Position - Orthostatic VS BP Location FiO2 (%)   Oral Monitor Sitting Left arm --      Pain Score       10 - Worst Possible Pain           Vitals:    09/07/23 0116 09/07/23 0145   BP: 158/69 150/63   Pulse: 69 66   Patient Position - Orthostatic VS: Sitting          Visual Acuity      ED Medications  Medications   lidocaine (LIDODERM) 5 % patch 1 patch (1 patch Topical Medication Applied 9/7/23 0207)   acetaminophen (TYLENOL) tablet 975 mg (975 mg Oral Given 9/7/23 0207)   ibuprofen (MOTRIN) tablet 600 mg (600 mg Oral Given 9/7/23 0207)       Diagnostic Studies  Results Reviewed     None                 XR hip/pelv 2-3 vws right if performed   ED Interpretation by Jeramy Ruiz MD (09/07 8791)   I interpreted this x-ray as no acute osseous abnormality. Procedures  Procedures         ED Course                               SBIRT 22yo+    Flowsheet Row Most Recent Value   Initial Alcohol Screen: US AUDIT-C     1. How often do you have a drink containing alcohol? 0 Filed at: 09/07/2023 0119   2. How many drinks containing alcohol do you have on a typical day you are drinking? 0 Filed at: 09/07/2023 0119   3a. Male UNDER 65:  How often do you have five or more drinks on one occasion? 0 Filed at: 09/07/2023 0119   3b. FEMALE Any Age, or MALE 65+: How often do you have 4 or more drinks on one occassion? 0 Filed at: 09/07/2023 0119   Audit-C Score 0 Filed at: 09/07/2023 0119   CHOLO: How many times in the past year have you. .. Used an illegal drug or used a prescription medication for non-medical reasons? Never Filed at: 09/07/2023 0119                    Medical Decision Making  This is a 60-year-old female presenting to the ED today with right-sided back and hip pain. At this time I think likely musculoskeletal strain however given her advanced age we will check a x-ray of the right pelvis to evaluate for possible occult fracture. We will treat her pain here with Tylenol, ibuprofen, Lidoderm patch. I did notice in the chart that she had a most recent GFR 46 however upon review the chart that was when she was admitted for diarrheal illness. Her other GFR's in the past have been well above 60. X-ray did not show any acute fractures. Encouraged her to continue Tylenol and ibuprofen as an outpatient. Lidoderm patches also prescribed to her pharmacy. Ambulatory referral placed for comprehensive spine. Encouraged outpatient follow-up with primary care provider. Patient was discharged home. Amount and/or Complexity of Data Reviewed  Radiology: ordered and independent interpretation performed. Risk  OTC drugs. Prescription drug management. Disposition  Final diagnoses:   Right hip pain     Time reflects when diagnosis was documented in both MDM as applicable and the Disposition within this note     Time User Action Codes Description Comment    9/7/2023  2:25 AM Ana Lilia Espinosa Add [M25.551] Right hip pain       ED Disposition     ED Disposition   Discharge    Condition   Stable    Date/Time   Thu Sep 7, 2023  2:25 AM    Comment   Jeff Antoine discharge to home/self care.                Follow-up Information     Follow up With Specialties Details Why Contact Info Additional Information    David John MD Internal Medicine Schedule an appointment as soon as possible for a visit in 2 days for follow up [address] [address] [facility] Emergency Department Emergency Medicine Go to  If symptoms worsen, As needed [address] [address]  [address] [facility] Emergency Department, [address] [facility] [address], [place] [address]          Discharge Medication List as of 9/7/2023  2:29 AM      START taking these medications    Details   lidocaine (Lidoderm) 5 % Apply 1 patch topically over 12 hours daily Remove & Discard patch within 12 hours or as directed by MD, Starting Thu 9/7/2023, Normal         CONTINUE these medications which have NOT CHANGED    Details   amLODIPine (NORVASC) 5 mg tablet Take 1 tablet (5 mg total) by mouth in the morning, Starting Fri 7/14/2023, Normal      hydrochlorothiazide (HYDRODIURIL) 12.5 mg tablet Take 1 tablet (12.5 mg total) by mouth every morning, Starting Fri 7/14/2023, Normal      levothyroxine 75 mcg tablet Take 1 tablet (75 mcg total) by mouth every morning Tues,thurs,fri, sat, sun, Starting Fri 7/14/2023, Normal      nebivolol (BYSTOLIC) 5 mg tablet Take 1 tablet (5 mg total) by mouth every morning, Starting Fri 7/14/2023, Normal      Omega-3 Fatty Acids (FISH OIL) 1200 MG CAPS Take by mouth 2 (two) times a day, Until Discontinued, Historical Med      pantoprazole (PROTONIX) 20 mg tablet Take 2 tablets (40 mg total) by mouth every morning, Starting Fri 7/14/2023, Normal      potassium chloride (Klor-Con) 10 mEq tablet Take 1 tablet (10 mEq total) by mouth every morning, Starting Fri 7/14/2023, Normal                 PDMP Review     None          ED Provider  Electronically Signed by           Schuyler Sagastume MD  09/07/23 4187

## 2023-09-08 ENCOUNTER — TELEPHONE (OUTPATIENT)
Dept: PHYSICAL THERAPY | Facility: OTHER | Age: 88
End: 2023-09-08

## 2023-09-08 NOTE — TELEPHONE ENCOUNTER
Nurse reached out to discuss recent referral entered for  Comprehensive Spine program.      This nurse called the patients preferred number 2x and call did not ring. Busy signal was heard both times. This was the first attempt to reach the patient. Referral deferred for a f/u attempt per protocol    NOTE: RN confirmed [facility] was listed on her ED AVS. Patient also has an appointment this morning with her Primary.

## 2023-09-11 DIAGNOSIS — M25.551 RIGHT HIP PAIN: ICD-10-CM

## 2023-09-11 RX ORDER — LIDOCAINE 50 MG/G
1 PATCH TOPICAL DAILY
Qty: 15 PATCH | Refills: 0 | OUTPATIENT
Start: 2023-09-11

## 2023-09-12 NOTE — TELEPHONE ENCOUNTER
Call placed to the patient per Comprehensive Spine program referral.    Jorge Romero. Unable to contact patient or leave message. This is the 2nd attempt to reach the patient. Will defer referral per protocol.

## 2023-09-15 ENCOUNTER — OFFICE VISIT (OUTPATIENT)
Dept: FAMILY MEDICINE CLINIC | Facility: CLINIC | Age: 88
End: 2023-09-15
Payer: MEDICARE

## 2023-09-15 VITALS
DIASTOLIC BLOOD PRESSURE: 68 MMHG | OXYGEN SATURATION: 100 % | HEIGHT: 57 IN | SYSTOLIC BLOOD PRESSURE: 160 MMHG | WEIGHT: 134 LBS | TEMPERATURE: 98 F | HEART RATE: 50 BPM | BODY MASS INDEX: 28.91 KG/M2 | RESPIRATION RATE: 15 BRPM

## 2023-09-15 DIAGNOSIS — N18.31 STAGE 3A CHRONIC KIDNEY DISEASE (HCC): ICD-10-CM

## 2023-09-15 DIAGNOSIS — I10 PRIMARY HYPERTENSION: ICD-10-CM

## 2023-09-15 DIAGNOSIS — M81.0 AGE-RELATED OSTEOPOROSIS WITHOUT CURRENT PATHOLOGICAL FRACTURE: ICD-10-CM

## 2023-09-15 DIAGNOSIS — M15.9 PRIMARY OSTEOARTHRITIS INVOLVING MULTIPLE JOINTS: ICD-10-CM

## 2023-09-15 DIAGNOSIS — M54.50 ACUTE RIGHT-SIDED LOW BACK PAIN WITHOUT SCIATICA: Primary | ICD-10-CM

## 2023-09-15 DIAGNOSIS — K21.9 GASTROESOPHAGEAL REFLUX DISEASE WITHOUT ESOPHAGITIS: ICD-10-CM

## 2023-09-15 DIAGNOSIS — E03.9 ACQUIRED HYPOTHYROIDISM: ICD-10-CM

## 2023-09-15 DIAGNOSIS — Z23 NEED FOR VACCINATION: ICD-10-CM

## 2023-09-15 DIAGNOSIS — E78.5 DYSLIPIDEMIA: ICD-10-CM

## 2023-09-15 PROCEDURE — 99214 OFFICE O/P EST MOD 30 MIN: CPT | Performed by: INTERNAL MEDICINE

## 2023-09-15 PROCEDURE — G0008 ADMIN INFLUENZA VIRUS VAC: HCPCS

## 2023-09-15 PROCEDURE — 90662 IIV NO PRSV INCREASED AG IM: CPT

## 2023-09-15 RX ORDER — BACLOFEN 10 MG/1
5 TABLET ORAL 3 TIMES DAILY PRN
Qty: 20 TABLET | Refills: 0 | Status: SHIPPED | OUTPATIENT
Start: 2023-09-15 | End: 2023-09-15 | Stop reason: SDUPTHER

## 2023-09-15 RX ORDER — BACLOFEN 10 MG/1
5 TABLET ORAL 3 TIMES DAILY PRN
Qty: 20 TABLET | Refills: 0 | Status: SHIPPED | OUTPATIENT
Start: 2023-09-15

## 2023-09-15 NOTE — PROGRESS NOTES
Office Visit Note  09/15/23     Vladimir Stone 80 y.o. female MRN: [id number]  : 1927    Assessment:     1. Acute right-sided low back pain without sciatica  Assessment & Plan:  Patient with right-sided low back pain increases with movements getting better slowly was seen in the emergency room and was prescribed Motrin Tylenol and Lidoderm patches which appears to have helped. Will recommend patient to hold off the Motrin for now we will renew the Lidoderm patches we will also give her some muscle relaxer baclofen 5 mg twice daily as needed    Orders:  -     baclofen 10 mg tablet; Take 0.5 tablets (5 mg total) by mouth 3 (three) times a day as needed for muscle spasms    2. Need for vaccination  -     influenza vaccine, high-dose, PF 0.7 mL (FLUZONE HIGH-DOSE)    3. Stage 3a chronic kidney disease [facility])  Assessment & Plan:  Lab Results   Component Value Date    EGFR 45 2023    EGFR 74 2023    EGFR 74 2023    CREATININE 1.04 2023    CREATININE 0.67 2023    CREATININE 0.69 2023   Patient's GFR is 45 creatinine 1.04 we will try to avoid nonsteroidals at this time. Follow-up with repeat CMP later      4. Acquired hypothyroidism  Assessment & Plan:  Continue Synthroid 75 mcg daily we will follow-up with repeat lab later. 5. Age-related osteoporosis without current pathological fracture  Assessment & Plan:  Patient at present time not on any medication she was on Prolia before recommend patient take calcium and vitamin D      6. Dyslipidemia  Assessment & Plan:  Patient was on simvastatin 10 mg at bedtime however it was discontinued when she was in the nursing home last cholesterol level is 208 and recommended a repeat cholesterol level but not done yet discussed with patient regarding diet and to go for blood work.       7. Gastroesophageal reflux disease without esophagitis  Assessment & Plan:  Patient is on Protonix 40 mg daily we will continue with the same she tried to discontinue but could not she does have GERD symptoms we will continue the same for now. 8. Primary osteoarthritis involving multiple joints  Assessment & Plan:  Patient is using walker to ambulate takes Tylenol as needed for the joint pains recommend to avoid anti-inflammatories especially with the low back pain she had recently      9. Primary hypertension  Assessment & Plan:  Blood pressure 160/68 with repeat 1 came back at 140/70. Currently patient is taking Bystolic 5 mg daily and amlodipine 5 mg daily along with HCTZ 12.5 we will continue. Discussion Summary and Plan: Today's care plan and medications were reviewed with patient in detail and all their questions answered to their satisfaction. Chief Complaint   Patient presents with   • Follow-up      Subjective:  Patient is coming here for evaluation regarding pain discomfort in the right hip area progressively increasing she has tried taking Tylenol and Salonpas at home but the pain persisted history of DJD pain is increasing with the movements of the head area no history of falls did not lift anything heavy object. Patient was seen in the emergency room work-up was negative and was prescribed Lidoderm patches Tylenol and also asked to take Motrin as needed the Lidoderm patch did help she is taking Motrin on and off however I am concerned about the kidney function. Patient still has some discomfort now but ambulating with the help of walker pain is more in the right side hip nonradiating. The following portions of the patient's history were reviewed and updated as appropriate: allergies, current medications, past family history, past medical history, past social history, past surgical history and problem list.    Review of Systems   Constitutional: Negative for chills and fever. HENT: Negative for ear pain and sore throat. Eyes: Negative for pain and visual disturbance.    Respiratory: Negative for cough and shortness of breath. Cardiovascular: Negative for chest pain and palpitations. Gastrointestinal: Negative for abdominal pain and vomiting. Genitourinary: Negative for dysuria and hematuria. Musculoskeletal: Positive for arthralgias and back pain. Skin: Negative for color change and rash. Neurological: Negative for seizures and syncope. All other systems reviewed and are negative. Historical Information   Patient Active Problem List   Diagnosis   • Primary hypertension   • Dyslipidemia   • Acquired hypothyroidism   • Gastroesophageal reflux disease without esophagitis   • Osteoarthritis of multiple joints   • Age-related osteoporosis without current pathological fracture   • Anemia   • Stage 3a chronic kidney disease ([address])   • Acute right-sided low back pain     Past Medical History:   Diagnosis Date   • Acid reflux    • Arthritis    • DDD (degenerative disc disease), lumbar    • Disease of thyroid gland     hypo   • DJD (degenerative joint disease)    • History of transfusion     many years ago-1940's (after fetal demise-very early pregnancy)   • Hyperlipidemia    • Hypertension    • Osteoporosis    • Restless leg      Past Surgical History:   Procedure Laterality Date   • APPENDECTOMY     • BREAST SURGERY Left     biopsy   • CATARACT EXTRACTION W/ INTRAOCULAR LENS IMPLANT Left 10/24/2016    Procedure: EXTRACTION EXTRACAPSULAR CATARACT PHACO INTRAOCULAR LENS (IOL); Surgeon: Fabian Sam MD;  Location: [facility] MAIN OR;  Service:    • DILATION AND CURETTAGE OF UTERUS      1970's   • DXA PROCEDURE (HISTORICAL)  08/18/2020   • HEMORRHOID SURGERY  2009   • HYSTERECTOMY  1950    with right oophorectomy   • WI XCAPSL CTRC RMVL INSJ IO LENS PROSTH W/O ECP Right 11/21/2016    Procedure: EXTRACTION EXTRACAPSULAR CATARACT PHACO INTRAOCULAR LENS (IOL);   Surgeon: Fabian Sam MD;  Location: [facility] OR;  Service: Ophthalmology   • SKIN BIOPSY      exc of the [facility]   • TOE SURGERY Right 1981    great toe removal of bone, and between the toes cyst removal   • TONSILLECTOMY  89   • TUBAL LIGATION       Social History     Substance and Sexual Activity   Alcohol Use Never     Social History     Substance and Sexual Activity   Drug Use Never     Social History     Tobacco Use   Smoking Status Former   • Types: Cigarettes   • Quit date:    • Years since quittin.7   Smokeless Tobacco Never   Tobacco Comments    social     Family History   Problem Relation Age of Onset   • Heart disease Father 47        MI     Health Maintenance Due   Topic   • Medicare Annual Wellness Visit (AWV)    • Pneumococcal Vaccine: 65+ Years (1 - PCV)   • COVID-19 Vaccine (4 - Moderna series)      Meds/Allergies       Current Outpatient Medications:   •  amLODIPine (NORVASC) 5 mg tablet, Take 1 tablet (5 mg total) by mouth in the morning, Disp: 90 tablet, Rfl: 1  •  baclofen 10 mg tablet, Take 0.5 tablets (5 mg total) by mouth 3 (three) times a day as needed for muscle spasms, Disp: 20 tablet, Rfl: 0  •  hydrochlorothiazide (HYDRODIURIL) 12.5 mg tablet, Take 1 tablet (12.5 mg total) by mouth every morning, Disp: 90 tablet, Rfl: 1  •  levothyroxine 75 mcg tablet, Take 1 tablet (75 mcg total) by mouth every morning Tues,thurs,fri, sat, sun, Disp: 90 tablet, Rfl: 1  •  lidocaine (Lidoderm) 5 %, Apply 1 patch topically over 12 hours daily Remove & Discard patch within 12 hours or as directed by MD, Disp: 15 patch, Rfl: 0  •  nebivolol (BYSTOLIC) 5 mg tablet, Take 1 tablet (5 mg total) by mouth every morning, Disp: 90 tablet, Rfl: 1  •  Omega-3 Fatty Acids (FISH OIL) 1200 MG CAPS, Take by mouth 2 (two) times a day, Disp: , Rfl:   •  pantoprazole (PROTONIX) 20 mg tablet, Take 2 tablets (40 mg total) by mouth every morning, Disp: 90 tablet, Rfl: 1  •  potassium chloride (Klor-Con) 10 mEq tablet, Take 1 tablet (10 mEq total) by mouth every morning, Disp: 90 tablet, Rfl: 1      Objective:    Vitals:   /68 Comment: Repeat blood pressure left arm 140/70  Pulse (!) 50   Temp 98 °F (36.7 °C)   Resp 15   Ht 4' 9" (1.448 m)   Wt 60.8 kg (134 lb)   SpO2 100%   BMI 29.00 kg/m²   Body mass index is 29 kg/m². Vitals:    09/15/23 1050   Weight: 60.8 kg (134 lb)       Physical Exam  Vitals and nursing note reviewed. Constitutional:       Appearance: Normal appearance. Cardiovascular:      Rate and Rhythm: Normal rate and regular rhythm. Heart sounds: Normal heart sounds. Pulmonary:      Effort: Pulmonary effort is normal.      Breath sounds: Normal breath sounds. Abdominal:      Palpations: Abdomen is soft. Musculoskeletal:      Cervical back: Normal range of motion and neck supple. Right lower leg: No edema. Left lower leg: No edema. Comments: SLR 30 degrees both sides   Skin:     General: Skin is warm and dry. Neurological:      Mental Status: She is alert and oriented to person, place, and time.          Lab Review   Appointment on 07/17/2023   Component Date Value Ref Range Status   • WBC 07/17/2023 5.31  4.31 - 10.16 Thousand/uL Final   • RBC 07/17/2023 3.94  3.81 - 5.12 Million/uL Final   • Hemoglobin 07/17/2023 12.3  11.5 - 15.4 g/dL Final   • Hematocrit 07/17/2023 37.8  34.8 - 46.1 % Final   • MCV 07/17/2023 96  82 - 98 fL Final   • MCH 07/17/2023 31.2  26.8 - 34.3 pg Final   • MCHC 07/17/2023 32.5  31.4 - 37.4 g/dL Final   • RDW 07/17/2023 14.6  11.6 - 15.1 % Final   • MPV 07/17/2023 12.8 (H)  8.9 - 12.7 fL Final   • Platelets 21/03/4354 197  149 - 390 Thousands/uL Final   • nRBC 07/17/2023 0  /100 WBCs Final   • Neutrophils Relative 07/17/2023 49  43 - 75 % Final   • Immat GRANS % 07/17/2023 1  0 - 2 % Final   • Lymphocytes Relative 07/17/2023 41  14 - 44 % Final   • Monocytes Relative 07/17/2023 7  4 - 12 % Final   • Eosinophils Relative 07/17/2023 2  0 - 6 % Final   • Basophils Relative 07/17/2023 0  0 - 1 % Final   • Neutrophils Absolute 07/17/2023 2.63  1.85 - 7.62 Thousands/µL Final   • Immature Grans Absolute 07/17/2023 0.03  0.00 - 0.20 Thousand/uL Final   • Lymphocytes Absolute 07/17/2023 2.17  0.60 - 4.47 Thousands/µL Final   • Monocytes Absolute 07/17/2023 0.35  0.17 - 1.22 Thousand/µL Final   • Eosinophils Absolute 07/17/2023 0.12  0.00 - 0.61 Thousand/µL Final   • Basophils Absolute 07/17/2023 0.01  0.00 - 0.10 Thousands/µL Final   • Sodium 07/17/2023 139  135 - 147 mmol/L Final   • Potassium 07/17/2023 5.1  3.5 - 5.3 mmol/L Final   • Chloride 07/17/2023 108  96 - 108 mmol/L Final   • CO2 07/17/2023 31  21 - 32 mmol/L Final   • ANION GAP 07/17/2023 0  mmol/L Final   • BUN 07/17/2023 28 (H)  5 - 25 mg/dL Final   • Creatinine 07/17/2023 1.04  0.60 - 1.30 mg/dL Final    Standardized to IDMS reference method   • Glucose, Fasting 07/17/2023 98  65 - 99 mg/dL Final    Specimen collection should occur prior to Sulfasalazine administration due to the potential for falsely depressed results. Specimen collection should occur prior to Sulfapyridine administration due to the potential for falsely elevated results. • Calcium 07/17/2023 9.9  8.3 - 10.1 mg/dL Final   • AST 07/17/2023 19  5 - 45 U/L Final    Specimen collection should occur prior to Sulfasalazine administration due to the potential for falsely depressed results. • ALT 07/17/2023 19  12 - 78 U/L Final    Specimen collection should occur prior to Sulfasalazine and/or Sulfapyridine administration due to the potential for falsely depressed results. • Alkaline Phosphatase 07/17/2023 52  46 - 116 U/L Final   • Total Protein 07/17/2023 7.4  6.4 - 8.4 g/dL Final   • Albumin 07/17/2023 3.8  3.5 - 5.0 g/dL Final   • Total Bilirubin 07/17/2023 0.46  0.20 - 1.00 mg/dL Final    Use of this assay is not recommended for patients undergoing treatment with eltrombopag due to the potential for falsely elevated results. • eGFR 07/17/2023 45  ml/min/1.73sq m Final   • Hemoglobin A1C 07/17/2023 5.1  Normal 3.8-5.6%; PreDiabetic 5.7-6.4%;  Diabetic >=6.5%; Glycemic control for adults with diabetes <7.0% % Final   • EAG 07/17/2023 100  mg/dl Final   • Cholesterol 07/17/2023 208 (H)  See Comment mg/dL Final    Cholesterol:         Pediatric <18 Years        Desirable          <170 mg/dL      Borderline High    170-199 mg/dL      High               >=200 mg/dL        Adult >=18 Years            Desirable         <200 mg/dL      Borderline High   200-239 mg/dL      High              >239 mg/dL     • Triglycerides 07/17/2023 163 (H)  See Comment mg/dL Final    Triglyceride:     0-9Y            <75mg/dL     10Y-17Y         <90 mg/dL       >=18Y     Normal          <150 mg/dL     Borderline High 150-199 mg/dL     High            200-499 mg/dL        Very High       >499 mg/dL    Specimen collection should occur prior to N-Acetylcysteine or Metamizole administration due to the potential for falsely depressed results. • HDL, Direct 07/17/2023 58  >=50 mg/dL Final    Specimen collection should occur prior to Metamizole administration due to the potential for falsley depressed results. • LDL Calculated 07/17/2023 117 (H)  0 - 100 mg/dL Final    LDL Cholesterol:     Optimal           <100 mg/dl     Near Optimal      100-129 mg/dl     Above Optimal       Borderline High 130-159 mg/dl       High            160-189 mg/dl       Very High       >189 mg/dl         This screening LDL is a calculated result. It does not have the accuracy of the Direct Measured LDL in the monitoring of patients with hyperlipidemia and/or statin therapy. Direct Measure LDL (TXK525) must be ordered separately in these patients.    • Non-HDL-Chol (CHOL-HDL) 07/17/2023 150  mg/dl Final   • TSH 3RD GENERATON 07/17/2023 5.467 (H)  0.450 - 4.500 uIU/mL Final    The recommended reference ranges for TSH during pregnancy are as follows:   First trimester 0.1 to 2.5 uIU/mL   Second trimester  0.2 to 3.0 uIU/mL   Third trimester 0.3 to 3.0 uIU/m    Note: Normal ranges may not apply to patients who are transgender, non-binary, or whose legal sex, sex at birth, and gender identity differ. Adult TSH (3rd generation) reference range follows the recommended guidelines of the [employer] Association, January, 2020. • Color, UA 07/17/2023 Light Yellow   Final   • Clarity, UA 07/17/2023 Clear   Final   • Specific Gravity, UA 07/17/2023 1.016  1.003 - 1.030 Final   • pH, UA 07/17/2023 5.0  4.5, 5.0, 5.5, 6.0, 6.5, 7.0, 7.5, 8.0 Final   • Leukocytes, UA 07/17/2023 Small (A)  Negative Final   • Nitrite, UA 07/17/2023 Negative  Negative Final   • Protein, UA 07/17/2023 Negative  Negative mg/dl Final   • Glucose, UA 07/17/2023 Negative  Negative mg/dl Final   • Ketones, UA 07/17/2023 Negative  Negative mg/dl Final   • Urobilinogen, UA 07/17/2023 <2.0  <2.0 mg/dl mg/dl Final   • Bilirubin, UA 07/17/2023 Negative  Negative Final   • Occult Blood, UA 07/17/2023 Negative  Negative Final   • RBC, UA 07/17/2023 1-2  None Seen, 1-2 /hpf Final   • WBC, UA 07/17/2023 1-2  None Seen, 1-2 /hpf Final   • Epithelial Cells 07/17/2023 Occasional  None Seen, Occasional /hpf Final   • Bacteria, UA 07/17/2023 Occasional  None Seen, Occasional /hpf Final   • MUCUS THREADS 07/17/2023 Occasional (A)  None Seen Final   • Free T4 07/17/2023 0.69  0.61 - 1.12 ng/dL Final    Specimens with biotin concentrations > 10 ng/mL can lead to significant (> 10%) positive bias in result. Jorge Orozco MD        "This note has been constructed using a voice recognition system. Therefore there may be syntax, spelling, and/or grammatical errors.  Please call if you have any questions. "

## 2023-11-10 ENCOUNTER — OFFICE VISIT (OUTPATIENT)
Dept: FAMILY MEDICINE CLINIC | Facility: CLINIC | Age: 88
End: 2023-11-10
Payer: MEDICARE

## 2023-11-10 VITALS
HEART RATE: 77 BPM | SYSTOLIC BLOOD PRESSURE: 140 MMHG | BODY MASS INDEX: 28.26 KG/M2 | DIASTOLIC BLOOD PRESSURE: 80 MMHG | HEIGHT: 57 IN | WEIGHT: 131 LBS | OXYGEN SATURATION: 97 %

## 2023-11-10 DIAGNOSIS — N18.31 STAGE 3A CHRONIC KIDNEY DISEASE (HCC): ICD-10-CM

## 2023-11-10 DIAGNOSIS — M15.9 PRIMARY OSTEOARTHRITIS INVOLVING MULTIPLE JOINTS: ICD-10-CM

## 2023-11-10 DIAGNOSIS — M81.0 AGE-RELATED OSTEOPOROSIS WITHOUT CURRENT PATHOLOGICAL FRACTURE: ICD-10-CM

## 2023-11-10 DIAGNOSIS — E78.5 DYSLIPIDEMIA: ICD-10-CM

## 2023-11-10 DIAGNOSIS — J06.9 UPPER RESPIRATORY TRACT INFECTION, UNSPECIFIED TYPE: Primary | ICD-10-CM

## 2023-11-10 DIAGNOSIS — I10 PRIMARY HYPERTENSION: ICD-10-CM

## 2023-11-10 DIAGNOSIS — Z00.00 MEDICARE ANNUAL WELLNESS VISIT, SUBSEQUENT: ICD-10-CM

## 2023-11-10 DIAGNOSIS — M54.50 ACUTE RIGHT-SIDED LOW BACK PAIN WITHOUT SCIATICA: ICD-10-CM

## 2023-11-10 DIAGNOSIS — E03.9 ACQUIRED HYPOTHYROIDISM: ICD-10-CM

## 2023-11-10 DIAGNOSIS — K21.9 GASTROESOPHAGEAL REFLUX DISEASE WITHOUT ESOPHAGITIS: ICD-10-CM

## 2023-11-10 PROCEDURE — 99213 OFFICE O/P EST LOW 20 MIN: CPT | Performed by: INTERNAL MEDICINE

## 2023-11-10 PROCEDURE — G0439 PPPS, SUBSEQ VISIT: HCPCS | Performed by: INTERNAL MEDICINE

## 2023-11-10 RX ORDER — LEVOTHYROXINE SODIUM 0.07 MG/1
75 TABLET ORAL EVERY MORNING
Qty: 90 TABLET | Refills: 0 | Status: SHIPPED | OUTPATIENT
Start: 2023-11-10

## 2023-11-10 RX ORDER — PANTOPRAZOLE SODIUM 40 MG/1
40 TABLET, DELAYED RELEASE ORAL EVERY MORNING
Qty: 90 TABLET | Refills: 1 | Status: SHIPPED | OUTPATIENT
Start: 2023-11-10

## 2023-11-10 NOTE — ASSESSMENT & PLAN NOTE
Patient with right-sided low back pain getting better uses patch Lidoderm as needed Tylenol as needed recommend not to take Motrin for now. Patient is also not taking muscle relaxer now movements of the spine mildly causing discomfort.

## 2023-11-10 NOTE — ASSESSMENT & PLAN NOTE
At present time not on any medication not willing to go on any medication recommend patient take calcium and vitamin D

## 2023-11-10 NOTE — PROGRESS NOTES
Assessment and Plan:     Problem List Items Addressed This Visit          Digestive    Gastroesophageal reflux disease without esophagitis     Currently patient is on Protonix 40 mg daily she could not go off of that medication. Relevant Medications    pantoprazole (PROTONIX) 40 mg tablet       Endocrine    Acquired hypothyroidism     Continue Synthroid 75 mcg daily we will follow-up with repeat lab and adjust the dose if necessary. Relevant Medications    levothyroxine 75 mcg tablet    Other Relevant Orders    TSH, 3rd generation with Free T4 reflex       Respiratory    Upper respiratory tract infection - Primary     Patient with upper respiratory tract symptoms including runny nose postnasal dripping no fever no loss of taste or smell no body aches no nausea vomiting diarrhea. Recommend symptomatic treatment for now take Claritin. No symptoms to suggest at this time for COVID but if the symptoms persist she should have it checked lungs are clear            Cardiovascular and Mediastinum    Primary hypertension     Blood pressure is 140/80 continue with amlodipine 5 mg Bystolic 5 mg and HCTZ 98.8 mg daily recommend cut back on salt intake. Musculoskeletal and Integument    Osteoarthritis of multiple joints     Recommend patient just take Tylenol as needed avoid anti-inflammatories         Age-related osteoporosis without current pathological fracture     At present time not on any medication not willing to go on any medication recommend patient take calcium and vitamin D            Genitourinary    Stage 3a chronic kidney disease ([address])     Lab Results   Component Value Date    EGFR 45 07/17/2023    EGFR 74 01/17/2023    EGFR 74 01/16/2023    CREATININE 1.04 07/17/2023    CREATININE 0.67 01/17/2023    CREATININE 0.69 01/16/2023   Patient is GFR is 45 we will follow-up with repeat 1 last creatinine 1.04.             Other    Dyslipidemia     Continue with simvastatin 10 mg at bedtime follow-up with repeat lipid profile which I have ordered last time but not done yet. Relevant Orders    Lipid panel    Acute right-sided low back pain     Patient with right-sided low back pain getting better uses patch Lidoderm as needed Tylenol as needed recommend not to take Motrin for now. Patient is also not taking muscle relaxer now movements of the spine mildly causing discomfort. Other Visit Diagnoses       Medicare annual wellness visit, subsequent                  Depression Screening and Follow-up Plan: Patient was screened for depression during today's encounter. They screened negative with a PHQ-2 score of 0. Preventive health issues were discussed with patient, and age appropriate screening tests were ordered as noted in patient's After Visit Summary. Personalized health advice and appropriate referrals for health education or preventive services given if needed, as noted in patient's After Visit Summary. History of Present Illness:     Patient presents for a Medicare Wellness Visit    Patient is coming here for a follow-up evaluation with regards to her low back pain which is gradually getting better also history of hypertension, hypothyroidism. Currently she is using a walker to ambulate. Denies any symptoms of headache dizziness chest pains palpitation shortness of breath does complain of pain in the joints. Her last TSH level was slightly high I ordered a repeat 1 not done yet. Medications reviewed labs reviewed. Complains of symptoms of upper respiratory tract with runny nose no fever no loss of taste or smell no cough congestion. Recommend symptomatic treatment for now. If necessary check for the COVID       Patient Care Team:  Ale Cummings MD as PCP - General (Internal Medicine)     Review of Systems:     Review of Systems   Constitutional:  Negative for chills and fever. HENT:  Negative for ear pain and sore throat.     Eyes:  Negative for pain and visual disturbance. Respiratory:  Negative for cough and shortness of breath. Cardiovascular:  Negative for chest pain and palpitations. Gastrointestinal:  Negative for abdominal pain and vomiting. Genitourinary:  Negative for dysuria and hematuria. Musculoskeletal:  Positive for arthralgias and back pain. Skin:  Negative for color change and rash. Neurological:  Negative for seizures and syncope. All other systems reviewed and are negative. Problem List:     Patient Active Problem List   Diagnosis    Primary hypertension    Dyslipidemia    Acquired hypothyroidism    Gastroesophageal reflux disease without esophagitis    Osteoarthritis of multiple joints    Age-related osteoporosis without current pathological fracture    Anemia    Stage 3a chronic kidney disease ([address])    Acute right-sided low back pain    Upper respiratory tract infection      Past Medical and Surgical History:     Past Medical History:   Diagnosis Date    Acid reflux     Arthritis     DDD (degenerative disc disease), lumbar     Disease of thyroid gland     hypo    DJD (degenerative joint disease)     History of transfusion     many years ago-1940's (after fetal demise-very early pregnancy)    Hyperlipidemia     Hypertension     Osteoporosis     Restless leg      Past Surgical History:   Procedure Laterality Date    APPENDECTOMY      BREAST SURGERY Left     biopsy    CATARACT EXTRACTION W/ INTRAOCULAR LENS IMPLANT Left 10/24/2016    Procedure: EXTRACTION EXTRACAPSULAR CATARACT PHACO INTRAOCULAR LENS (IOL); Surgeon: Abdirahman Mendez MD;  Location: [facility] MAIN OR;  Service:     DILATION AND CURETTAGE OF UTERUS      1970's    DXA PROCEDURE (HISTORICAL)  08/18/2020    HEMORRHOID SURGERY  2009    HYSTERECTOMY  1950    with right oophorectomy    ME XCAPSL CTRC RMVL INSJ IO LENS PROSTH W/O ECP Right 11/21/2016    Procedure: EXTRACTION EXTRACAPSULAR CATARACT PHACO INTRAOCULAR LENS (IOL);   Surgeon: Abdirahman Mendez MD;  Location: [facility] MAIN OR;  Service: Ophthalmology    SKIN BIOPSY      exc of the back-    TOE SURGERY Right 1981    great toe removal of bone, and between the toes cyst removal    TONSILLECTOMY  89    TUBAL LIGATION        Family History:     Family History   Problem Relation Age of Onset    Heart disease Father 47        MI      Social History:     Social History     Socioeconomic History    Marital status:      Spouse name: None    Number of children: None    Years of education: None    Highest education level: None   Occupational History    None   Tobacco Use    Smoking status: Former     Types: Cigarettes     Quit date:      Years since quittin.8     Passive exposure: Past    Smokeless tobacco: Never    Tobacco comments:     social   Vaping Use    Vaping Use: Never used   Substance and Sexual Activity    Alcohol use: Never    Drug use: Never    Sexual activity: None   Other Topics Concern    None   Social History Narrative    None     Social Determinants of Health     Financial Resource Strain: Low Risk  (11/10/2023)    Overall Financial Resource Strain (CARDIA)     Difficulty of Paying Living Expenses: Not hard at all   Food Insecurity: No Food Insecurity (2023)    Hunger Vital Sign     Worried About Running Out of Food in the Last Year: Never true     [address] in the Last Year: Never true   Transportation Needs: No Transportation Needs (11/10/2023)    PRAPARE - Transportation     Lack of Transportation (Medical): No     Lack of Transportation (Non-Medical):  No   Physical Activity: Not on file   Stress: Not on file   Social Connections: Not on file   Intimate Partner Violence: Not on file   Housing Stability: Low Risk  (2023)    Housing Stability Vital Sign     Unable to Pay for Housing in the Last Year: No     Number of Places Lived in the Last Year: 1     Unstable Housing in the Last Year: No      Medications and Allergies:     Current Outpatient Medications   Medication Sig Dispense Refill    amLODIPine (NORVASC) 5 mg tablet Take 1 tablet (5 mg total) by mouth in the morning 90 tablet 1    baclofen 10 mg tablet Take 0.5 tablets (5 mg total) by mouth 3 (three) times a day as needed for muscle spasms 20 tablet 0    hydrochlorothiazide (HYDRODIURIL) 12.5 mg tablet Take 1 tablet (12.5 mg total) by mouth every morning 90 tablet 1    levothyroxine 75 mcg tablet Take 1 tablet (75 mcg total) by mouth every morning Tues,thurs,fri, sat, sun 90 tablet 0    lidocaine (Lidoderm) 5 % Apply 1 patch topically over 12 hours daily Remove & Discard patch within 12 hours or as directed by MD 15 patch 0    nebivolol (BYSTOLIC) 5 mg tablet Take 1 tablet (5 mg total) by mouth every morning 90 tablet 1    Omega-3 Fatty Acids (FISH OIL) 1200 MG CAPS Take by mouth 2 (two) times a day      pantoprazole (PROTONIX) 40 mg tablet Take 1 tablet (40 mg total) by mouth every morning 90 tablet 1    potassium chloride (Klor-Con) 10 mEq tablet Take 1 tablet (10 mEq total) by mouth every morning 90 tablet 1     No current facility-administered medications for this visit. Allergies   Allergen Reactions    Allegra [Fexofenadine] Itching    Sulfa Antibiotics GI Intolerance     N/V    Aspirin Rash     Only to baby aspirin      Immunizations:     Immunization History   Administered Date(s) Administered    COVID-19 MODERNA VACC 0.5 ML IM 03/15/2021, 04/14/2021    COVID-19 Moderna Vac BIVALENT 12 Yr+ IM 0.5 ML 01/20/2023    Influenza, high dose seasonal 0.7 mL 10/26/2022, 09/15/2023      Health Maintenance: There are no preventive care reminders to display for this patient. Topic Date Due    Pneumococcal Vaccine: 65+ Years (1 - PCV) Never done    COVID-19 Vaccine (4 - Moderna series) 05/20/2023      Medicare Screening Tests and Risk Assessments:     Yong Waite is here for her Subsequent Wellness visit. Last Medicare Wellness visit information reviewed, patient interviewed and updates made to the record today.       Health Risk Assessment:   Patient rates overall health as good. Patient feels that their physical health rating is same. Patient is very satisfied with their life. Eyesight was rated as same. Hearing was rated as same. Patient feels that their emotional and mental health rating is same. Patients states they are sometimes angry. Patient states they are sometimes unusually tired/fatigued. Pain experienced in the last 7 days has been none. Patient states that she has experienced no weight loss or gain in last 6 months. Satisfied with life    Depression Screening:   PHQ-2 Score: 0      Fall Risk Screening: In the past year, patient has experienced: no history of falling in past year      Urinary Incontinence Screening:   Patient has not leaked urine accidently in the last six months. Home Safety:  Patient does not have trouble with stairs inside or outside of their home. Patient has working smoke alarms and has working carbon monoxide detector. Home safety hazards include: none. Home is very safe    Nutrition:   Current diet is Regular. Cooks for herself    Medications:   Patient is currently taking over-the-counter supplements. OTC medications include: see medication list. Patient is able to manage medications. Activities of Daily Living (ADLs)/Instrumental Activities of Daily Living (IADLs):   Walk and transfer into and out of bed and chair?: Yes  Dress and groom yourself?: Yes    Bathe or shower yourself?: Yes    Feed yourself?  Yes  Do your laundry/housekeeping?: Yes  Manage your money, pay your bills and track your expenses?: Yes  Make your own meals?: Yes    Do your own shopping?: Yes    Previous Hospitalizations:   Any hospitalizations or ED visits within the last 12 months?: Yes    How many hospitalizations have you had in the last year?: 1-2    Hospitalization Comments: Vomiting went to [facility]     Advance Care Planning:   Living will: Yes    Advanced directive: Yes      PREVENTIVE SCREENINGS Cardiovascular Screening:    General: Screening Current      Diabetes Screening:     General: Screening Current      Colorectal Cancer Screening:     General: Screening Not Indicated      Cervical Cancer Screening:    General: Screening Not Indicated      Osteoporosis Screening:    General: Screening Not Indicated and History Osteoporosis      Lung Cancer Screening:     General: Screening Not Indicated    Screening, Brief Intervention, and Referral to Treatment (SBIRT)    Screening  Typical number of drinks in a day: 0  Typical number of drinks in a week: 0  Interpretation: Low risk drinking behavior. AUDIT-C Screenin) How often did you have a drink containing alcohol in the past year? never  2) How many drinks did you have on a typical day when you were drinking in the past year? 0  3) How often did you have 6 or more drinks on one occasion in the past year? never    AUDIT-C Score: 0  Interpretation: Score 0-2 (female): Negative screen for alcohol misuse    No results found. Physical Exam:     /80 (BP Location: Right arm, Patient Position: Sitting, Cuff Size: Standard)   Pulse 77   Ht 4' 9" (1.448 m)   Wt 59.4 kg (131 lb)   SpO2 97%   BMI 28.35 kg/m²     Physical Exam  Vitals and nursing note reviewed. Constitutional:       General: She is not in acute distress. Appearance: She is well-developed. HENT:      Head: Normocephalic and atraumatic. Eyes:      Conjunctiva/sclera: Conjunctivae normal.   Cardiovascular:      Rate and Rhythm: Normal rate and regular rhythm. Heart sounds: No murmur heard. Pulmonary:      Effort: Pulmonary effort is normal. No respiratory distress. Breath sounds: Normal breath sounds. Abdominal:      Palpations: Abdomen is soft. Tenderness: There is no abdominal tenderness. Musculoskeletal:         General: No swelling. Cervical back: Neck supple. Skin:     General: Skin is warm and dry.       Capillary Refill: Capillary refill takes less than 2 seconds. Neurological:      Mental Status: She is alert.    Psychiatric:         Mood and Affect: Mood normal.          Shane Caceres MD

## 2023-11-10 NOTE — ASSESSMENT & PLAN NOTE
Continue with simvastatin 10 mg at bedtime follow-up with repeat lipid profile which I have ordered last time but not done yet.

## 2023-11-10 NOTE — ASSESSMENT & PLAN NOTE
Lab Results   Component Value Date    EGFR 45 07/17/2023    EGFR 74 01/17/2023    EGFR 74 01/16/2023    CREATININE 1.04 07/17/2023    CREATININE 0.67 01/17/2023    CREATININE 0.69 01/16/2023   Patient is GFR is 45 we will follow-up with repeat 1 last creatinine 1.04.

## 2023-11-10 NOTE — ASSESSMENT & PLAN NOTE
Patient with upper respiratory tract symptoms including runny nose postnasal dripping no fever no loss of taste or smell no body aches no nausea vomiting diarrhea. Recommend symptomatic treatment for now take Claritin.   No symptoms to suggest at this time for COVID but if the symptoms persist she should have it checked lungs are clear

## 2023-11-10 NOTE — PATIENT INSTRUCTIONS
Medicare Preventive Visit Patient Instructions  Thank you for completing your Welcome to Medicare Visit or Medicare Annual Wellness Visit today. Your next wellness visit will be due in one year (11/10/2024). The screening/preventive services that you may require over the next 5-10 years are detailed below. Some tests may not apply to you based off risk factors and/or age. Screening tests ordered at today's visit but not completed yet may show as past due. Also, please note that scanned in results may not display below. Preventive Screenings:  Service Recommendations Previous Testing/Comments   Colorectal Cancer Screening  * Colonoscopy    * Fecal Occult Blood Test (FOBT)/Fecal Immunochemical Test (FIT)  * Fecal DNA/Cologuard Test  * Flexible Sigmoidoscopy Age: 43-73 years old   Colonoscopy: every 10 years (may be performed more frequently if at higher risk)  OR  FOBT/FIT: every 1 year  OR  Cologuard: every 3 years  OR  Sigmoidoscopy: every 5 years  Screening may be recommended earlier than age 39 if at higher risk for colorectal cancer. Also, an individualized decision between you and your healthcare provider will decide whether screening between the ages of 77-80 would be appropriate. Colonoscopy: Not on file  FOBT/FIT: Not on file  Cologuard: Not on file  Sigmoidoscopy: Not on file          Breast Cancer Screening Age: 36 years old  Frequency: every 1-2 years  Not required if history of left and right mastectomy Mammogram: Not on file        Cervical Cancer Screening Between the ages of 21-29, pap smear recommended once every 3 years. Between the ages of 32-69, can perform pap smear with HPV co-testing every 5 years.    Recommendations may differ for women with a history of total hysterectomy, cervical cancer, or abnormal pap smears in past. Pap Smear: Not on file        Hepatitis C Screening Once for adults born between [address] [place] [address]  More frequently in patients at high risk for Hepatitis C Hep C Antibody: Not on file        Diabetes Screening 1-2 times per year if you're at risk for diabetes or have pre-diabetes Fasting glucose: 98 mg/dL (7/17/2023)  A1C: 5.1 % (7/17/2023)      Cholesterol Screening Once every 5 years if you don't have a lipid disorder. May order more often based on risk factors. Lipid panel: 07/17/2023          Other Preventive Screenings Covered by Medicare:  Abdominal Aortic Aneurysm (AAA) Screening: covered once if your at risk. You're considered to be at risk if you have a family history of AAA. Lung Cancer Screening: covers low dose CT scan once per year if you meet all of the following conditions: (1) Age 48-67; (2) No signs or symptoms of lung cancer; (3) Current smoker or have quit smoking within the last 15 years; (4) You have a tobacco smoking history of at least 20 pack years (packs per day multiplied by number of years you smoked); (5) You get a written order from a healthcare provider. Glaucoma Screening: covered annually if you're considered high risk: (1) You have diabetes OR (2) Family history of glaucoma OR (3)  aged 48 and older OR (3)  American aged 72 and older  Osteoporosis Screening: covered every 2 years if you meet one of the following conditions: (1) You're estrogen deficient and at risk for osteoporosis based off medical history and other findings; (2) Have a vertebral abnormality; (3) On glucocorticoid therapy for more than 3 months; (4) Have primary hyperparathyroidism; (5) On osteoporosis medications and need to assess response to drug therapy. Last bone density test (DXA Scan): 08/18/2020. HIV Screening: covered annually if you're between the age of 14-79. Also covered annually if you are younger than 13 and older than 72 with risk factors for HIV infection. For pregnant patients, it is covered up to 3 times per pregnancy.     Immunizations:  Immunization Recommendations   Influenza Vaccine Annual influenza vaccination during flu season is recommended for all persons aged >= 6 months who do not have contraindications   Pneumococcal Vaccine   * Pneumococcal conjugate vaccine = PCV13 (Prevnar 13), PCV15 (Vaxneuvance), PCV20 (Prevnar 20)  * Pneumococcal polysaccharide vaccine = PPSV23 (Pneumovax) Adults 36-87 yo with certain risk factors or if 69+ yo  If never received any pneumonia vaccine: recommend Prevnar 20 (PCV20)  Give PCV20 if previously received 1 dose of PCV13 or PPSV23   Hepatitis B Vaccine 3 dose series if at intermediate or high risk (ex: diabetes, end stage renal disease, liver disease)   Respiratory syncytial virus (RSV) Vaccine - COVERED BY MEDICARE PART D  * RSVPreF3 (Arexvy) CDC recommends that adults 61years of age and older may receive a single dose of RSV vaccine using shared clinical decision-making (SCDM)   Tetanus (Td) Vaccine - COST NOT COVERED BY MEDICARE PART B Following completion of primary series, a booster dose should be given every 10 years to maintain immunity against tetanus. Td may also be given as tetanus wound prophylaxis. Tdap Vaccine - COST NOT COVERED BY MEDICARE PART B Recommended at least once for all adults. For pregnant patients, recommended with each pregnancy. Shingles Vaccine (Shingrix) - COST NOT COVERED BY MEDICARE PART B  2 shot series recommended in those 19 years and older who have or will have weakened immune systems or those 50 years and older     Health Maintenance Due:  There are no preventive care reminders to display for this patient. Immunizations Due:      Topic Date Due   • Pneumococcal Vaccine: 65+ Years (1 - PCV) Never done   • COVID-19 Vaccine (4 - Moderna series) 05/20/2023     Advance Directives   What are advance directives? Advance directives are legal documents that state your wishes and plans for medical care. These plans are made ahead of time in case you lose your ability to make decisions for yourself.  Advance directives can apply to any medical decision, such as the treatments you want, and if you want to donate organs. What are the types of advance directives? There are many types of advance directives, and each state has rules about how to use them. You may choose a combination of any of the following:  Living will: This is a written record of the treatment you want. You can also choose which treatments you do not want, which to limit, and which to stop at a certain time. This includes surgery, medicine, IV fluid, and tube feedings. Durable power of  for healthcare [facility]): This is a written record that states who you want to make healthcare choices for you when you are unable to make them for yourself. This person, called a proxy, is usually a family member or a friend. You may choose more than 1 proxy. Do not resuscitate (DNR) order:  A DNR order is used in case your heart stops beating or you stop breathing. It is a request not to have certain forms of treatment, such as CPR. A DNR order may be included in other types of advance directives. Medical directive: This covers the care that you want if you are in a coma, near death, or unable to make decisions for yourself. You can list the treatments you want for each condition. Treatment may include pain medicine, surgery, blood transfusions, dialysis, IV or tube feedings, and a ventilator (breathing machine). Values history: This document has questions about your views, beliefs, and how you feel and think about life. This information can help others choose the care that you would choose. Why are advance directives important? An advance directive helps you control your care. Although spoken wishes may be used, it is better to have your wishes written down. Spoken wishes can be misunderstood, or not followed. Treatments may be given even if you do not want them. An advance directive may make it easier for your family to make difficult choices about your care.    Weight Management   Why it is important to manage your weight:  Being overweight increases your risk of health conditions such as heart disease, high blood pressure, type 2 diabetes, and certain types of cancer. It can also increase your risk for osteoarthritis, sleep apnea, and other respiratory problems. Aim for a slow, steady weight loss. Even a small amount of weight loss can lower your risk of health problems. How to lose weight safely:  A safe and healthy way to lose weight is to eat fewer calories and get regular exercise. You can lose up about 1 pound a week by decreasing the number of calories you eat by 500 calories each day. Healthy meal plan for weight management:  A healthy meal plan includes a variety of foods, contains fewer calories, and helps you stay healthy. A healthy meal plan includes the following:  Eat whole-grain foods more often. A healthy meal plan should contain fiber. Fiber is the part of grains, fruits, and vegetables that is not broken down by your body. Whole-grain foods are healthy and provide extra fiber in your diet. Some examples of whole-grain foods are whole-wheat breads and pastas, oatmeal, brown rice, and bulgur. Eat a variety of vegetables every day. Include dark, leafy greens such as spinach, kale, julee greens, and mustard greens. Eat yellow and orange vegetables such as carrots, sweet potatoes, and winter squash. Eat a variety of fruits every day. Choose fresh or canned fruit (canned in its own juice or light syrup) instead of juice. Fruit juice has very little or no fiber. Eat low-fat dairy foods. Drink fat-free (skim) milk or 1% milk. Eat fat-free yogurt and low-fat cottage cheese. Try low-fat cheeses such as mozzarella and other reduced-fat cheeses. Choose meat and other protein foods that are low in fat. Choose beans or other legumes such as split peas or lentils. Choose fish, skinless poultry (chicken or turkey), or lean cuts of red meat (beef or pork).  Before you cook meat or poultry, cut off any visible fat. Use less fat and oil. Try baking foods instead of frying them. Add less fat, such as margarine, sour cream, regular salad dressing and mayonnaise to foods. Eat fewer high-fat foods. Some examples of high-fat foods include french fries, doughnuts, ice cream, and cakes. Eat fewer sweets. Limit foods and drinks that are high in sugar. This includes candy, cookies, regular soda, and sweetened drinks. Exercise:  Exercise at least 30 minutes per day on most days of the week. Some examples of exercise include walking, biking, dancing, and swimming. You can also fit in more physical activity by taking the stairs instead of the elevator or parking farther away from stores. Ask your healthcare provider about the best exercise plan for you. © Copyright [address] 2018 Information is for End User's use only and may not be sold, redistributed or otherwise used for commercial purposes.  All illustrations and images included in CareNotes® are the copyrighted property of [employer] Inc. or [address] [place] [address]

## 2023-11-10 NOTE — ASSESSMENT & PLAN NOTE
Blood pressure is 140/80 continue with amlodipine 5 mg Bystolic 5 mg and HCTZ 99.1 mg daily recommend cut back on salt intake.

## 2024-01-09 DIAGNOSIS — I10 PRIMARY HYPERTENSION: ICD-10-CM

## 2024-01-09 DIAGNOSIS — E03.9 ACQUIRED HYPOTHYROIDISM: ICD-10-CM

## 2024-01-09 DIAGNOSIS — K21.9 GASTROESOPHAGEAL REFLUX DISEASE WITHOUT ESOPHAGITIS: ICD-10-CM

## 2024-01-09 RX ORDER — AMLODIPINE BESYLATE 5 MG/1
5 TABLET ORAL DAILY
Qty: 90 TABLET | Refills: 1 | Status: SHIPPED | OUTPATIENT
Start: 2024-01-09

## 2024-01-09 RX ORDER — POTASSIUM CHLORIDE 750 MG/1
10 TABLET, FILM COATED, EXTENDED RELEASE ORAL EVERY MORNING
Qty: 90 TABLET | Refills: 1 | Status: SHIPPED | OUTPATIENT
Start: 2024-01-09

## 2024-01-09 RX ORDER — NEBIVOLOL 5 MG/1
5 TABLET ORAL EVERY MORNING
Qty: 90 TABLET | Refills: 1 | Status: SHIPPED | OUTPATIENT
Start: 2024-01-09

## 2024-01-09 RX ORDER — PANTOPRAZOLE SODIUM 40 MG/1
40 TABLET, DELAYED RELEASE ORAL EVERY MORNING
Qty: 90 TABLET | Refills: 1 | Status: SHIPPED | OUTPATIENT
Start: 2024-01-09

## 2024-01-09 RX ORDER — HYDROCHLOROTHIAZIDE 12.5 MG/1
12.5 TABLET ORAL EVERY MORNING
Qty: 90 TABLET | Refills: 1 | Status: SHIPPED | OUTPATIENT
Start: 2024-01-09

## 2024-01-09 RX ORDER — LEVOTHYROXINE SODIUM 0.07 MG/1
75 TABLET ORAL EVERY MORNING
Qty: 90 TABLET | Refills: 1 | Status: SHIPPED | OUTPATIENT
Start: 2024-01-09

## 2024-01-09 NOTE — TELEPHONE ENCOUNTER
Reason for call:   [x] Refill   [] Prior Auth  [] Other:     Office:   [x] PCP/Provider -   [] Specialty/Provider -     Medication:   Amlodipine 5 mg tablet- take 1 tablet by mouth in the morning  Hydrochlorothiazide 12.5mg tablet- take 1 tablet by mouth every morning  Levothyroxine 75mcg tablet- take 1 tablet by mouth every morning  Nebivolol 5mg tablet- take 1 tablet by mouth every morning  Pantoprazole 40mg- take 1 tablet by mouth every morning  Potassium Chloride 10meq- take 1 tablet by mouth every morning    Pharmacy: [facility]    Does the patient have enough for 3 days?   [x] Yes   [] No - Send as HP to POD

## 2024-02-08 ENCOUNTER — APPOINTMENT (OUTPATIENT)
Dept: LAB | Facility: CLINIC | Age: 89
End: 2024-02-08
Payer: MEDICARE

## 2024-02-08 DIAGNOSIS — E78.5 DYSLIPIDEMIA: ICD-10-CM

## 2024-02-08 DIAGNOSIS — E03.9 ACQUIRED HYPOTHYROIDISM: ICD-10-CM

## 2024-02-08 LAB
T4 FREE SERPL-MCNC: 0.76 NG/DL (ref 0.61–1.12)
TSH SERPL DL<=0.05 MIU/L-ACNC: 7.72 UIU/ML (ref 0.45–4.5)

## 2024-02-08 PROCEDURE — 80061 LIPID PANEL: CPT

## 2024-02-08 PROCEDURE — 36415 COLL VENOUS BLD VENIPUNCTURE: CPT

## 2024-02-08 PROCEDURE — 84439 ASSAY OF FREE THYROXINE: CPT

## 2024-02-08 PROCEDURE — 84443 ASSAY THYROID STIM HORMONE: CPT

## 2024-02-09 LAB
CHOLEST SERPL-MCNC: 216 MG/DL
HDLC SERPL-MCNC: 58 MG/DL
LDLC SERPL CALC-MCNC: 133 MG/DL (ref 0–100)
NONHDLC SERPL-MCNC: 158 MG/DL
TRIGL SERPL-MCNC: 127 MG/DL

## 2024-02-15 NOTE — PROGRESS NOTES
Office Visit Note  24     Nisreen Parrish 89 y.o. female MRN: [id number]  : 1927    Assessment:     1. Acquired hypothyroidism  Assessment & Plan:  Patient's TSH level is coming up high at 7.72 currently patient is taking 75 mcg of levothyroxine 5 days in a week.  Recommend patient to start taking 1 daily and will follow-up with repeat TSH level in 3 months from now.    Orders:  -     levothyroxine 75 mcg tablet; Take 1 tablet (75 mcg total) by mouth daily in the early morning Start taking levothyroxine 75 mcg daily    2. Stage 3a chronic kidney disease (HCC)  Assessment & Plan:  Lab Results   Component Value Date    EGFR 45 2023    EGFR 74 2023    EGFR 74 2023    CREATININE 1.04 2023    CREATININE 0.67 2023    CREATININE 0.69 2023   We will follow-up with repeat BMP      3. Age-related osteoporosis without current pathological fracture  Assessment & Plan:  Patient with osteoporosis does not want to go on any medication currently taking calcium and vitamin D will continue the same.      4. Dyslipidemia  Assessment & Plan:  Lipid profile shows cholesterol of 216 triglycerides 127 LDL at 133 currently she is not taking any medications will resume the simvastatin she has taken in the past 10 mg at bedtime    Orders:  -     simvastatin (ZOCOR) 10 mg tablet; Take 1 tablet (10 mg total) by mouth daily at bedtime    5. Gastroesophageal reflux disease without esophagitis  Assessment & Plan:  Patient with GERD symptoms without medication Protonix she is having increasing symptoms we will continue with the same for now.      6. Primary osteoarthritis involving multiple joints  Assessment & Plan:  Tylenol as needed avoid anti-inflammatories      7. Primary hypertension  Assessment & Plan:  Blood pressure today stable at 128/72 currently on amlodipine 5 mg daily along with HydroDIURIL 12.5 mg and nebivolol 5 mg continue                 Discussion Summary and Plan:  Today's care  plan and medications were reviewed with patient in detail and all their questions answered to their satisfaction.    Chief Complaint   Patient presents with    Follow-up      Subjective:  Patient is coming here for a follow-up evaluation with a history of hypertension, dyslipidemia, osteoporosis and DJD.  Denies any symptoms of chest pains palpitation shortness of breath.  Medications reviewed labs reviewed TSH is coming up high.  Patient just turned 89 years old still driving.  Patient uses walker to assist with ambulation.        The following portions of the patient's history were reviewed and updated as appropriate: allergies, current medications, past family history, past medical history, past social history, past surgical history and problem list.    Review of Systems   Constitutional:  Negative for chills and fever.   HENT:  Negative for ear pain and sore throat.    Eyes:  Negative for pain and visual disturbance.   Respiratory:  Negative for cough and shortness of breath.    Cardiovascular:  Negative for chest pain and palpitations.   Gastrointestinal:  Negative for abdominal pain and vomiting.   Genitourinary:  Negative for dysuria and hematuria.   Musculoskeletal:  Positive for arthralgias and back pain.   Skin:  Negative for color change and rash.   Neurological:  Negative for seizures and syncope.   All other systems reviewed and are negative.        Historical Information   Patient Active Problem List   Diagnosis    Primary hypertension    Dyslipidemia    Acquired hypothyroidism    Gastroesophageal reflux disease without esophagitis    Osteoarthritis of multiple joints    Age-related osteoporosis without current pathological fracture    Stage 3a chronic kidney disease (HCC)    Upper respiratory tract infection     Past Medical History:   Diagnosis Date    Acid reflux     Arthritis     DDD (degenerative disc disease), lumbar     Disease of thyroid gland     hypo    DJD (degenerative joint disease)      History of transfusion     many years ago-1940s (after fetal demise-very early pregnancy)    Hyperlipidemia     Hypertension     Osteoporosis     Restless leg      Past Surgical History:   Procedure Laterality Date    APPENDECTOMY      BREAST SURGERY Left     biopsy    CATARACT EXTRACTION W/ INTRAOCULAR LENS IMPLANT Left 10/24/2016    Procedure: EXTRACTION EXTRACAPSULAR CATARACT PHACO INTRAOCULAR LENS (IOL);  Surgeon: Aaron Meza MD;  Location: [facility] MAIN OR;  Service:     DILATION AND CURETTAGE OF UTERUS          DXA PROCEDURE (HISTORICAL)  2020    HEMORRHOID SURGERY  2009    HYSTERECTOMY  1950    with right oophorectomy    IA XCAPSL CTRC RMVL INSJ IO LENS PROSTH W/O ECP Right 2016    Procedure: EXTRACTION EXTRACAPSULAR CATARACT PHACO INTRAOCULAR LENS (IOL);  Surgeon: Aaron Meza MD;  Location: [facility] MAIN OR;  Service: Ophthalmology    SKIN BIOPSY      exc of the back-    TOE SURGERY Right     great toe removal of bone, and between the toes cyst removal    TONSILLECTOMY  89    TUBAL LIGATION       Social History     Substance and Sexual Activity   Alcohol Use Never     Social History     Substance and Sexual Activity   Drug Use Never     Social History     Tobacco Use   Smoking Status Former    Current packs/day: 0.00    Types: Cigarettes    Quit date:     Years since quittin.1    Passive exposure: Past   Smokeless Tobacco Never   Tobacco Comments    social     Family History   Problem Relation Age of Onset    Heart disease Father 54        MI     Health Maintenance Due   Topic    Zoster Vaccine (1 of 2)    Pneumococcal Vaccine: 65+ Years (1 of 1 - PCV)    COVID-19 Vaccine ( season)      Meds/Allergies       Current Outpatient Medications:     amLODIPine (NORVASC) 5 mg tablet, Take 1 tablet (5 mg total) by mouth in the morning, Disp: 90 tablet, Rfl: 1    baclofen 10 mg tablet, Take 0.5 tablets (5 mg total) by mouth 3 (three) times a day as needed for muscle  "spasms, Disp: 20 tablet, Rfl: 0    hydrochlorothiazide (HYDRODIURIL) 12.5 mg tablet, Take 1 tablet (12.5 mg total) by mouth every morning, Disp: 90 tablet, Rfl: 1    levothyroxine 75 mcg tablet, Take 1 tablet (75 mcg total) by mouth daily in the early morning Start taking levothyroxine 75 mcg daily, Disp: 90 tablet, Rfl: 1    lidocaine (Lidoderm) 5 %, Apply 1 patch topically over 12 hours daily Remove & Discard patch within 12 hours or as directed by MD, Disp: 15 patch, Rfl: 0    nebivolol (BYSTOLIC) 5 mg tablet, Take 1 tablet (5 mg total) by mouth every morning, Disp: 90 tablet, Rfl: 1    Omega-3 Fatty Acids (FISH OIL) 1200 MG CAPS, Take by mouth 2 (two) times a day, Disp: , Rfl:     pantoprazole (PROTONIX) 40 mg tablet, Take 1 tablet (40 mg total) by mouth every morning, Disp: 90 tablet, Rfl: 1    potassium chloride (Klor-Con) 10 mEq tablet, Take 1 tablet (10 mEq total) by mouth every morning, Disp: 90 tablet, Rfl: 1    simvastatin (ZOCOR) 10 mg tablet, Take 1 tablet (10 mg total) by mouth daily at bedtime, Disp: 90 tablet, Rfl: 3      Objective:    Vitals:   /72 (BP Location: Right arm, Patient Position: Sitting, Cuff Size: Standard)   Pulse 75   Ht 4' 9\" (1.448 m)   Wt 59.9 kg (132 lb)   SpO2 97%   BMI 28.56 kg/m²   Body mass index is 28.56 kg/m².  Vitals:    02/16/24 0950   Weight: 59.9 kg (132 lb)       Physical Exam  Vitals and nursing note reviewed.   Constitutional:       Appearance: Normal appearance.   Cardiovascular:      Rate and Rhythm: Normal rate and regular rhythm.      Heart sounds: Normal heart sounds.   Pulmonary:      Effort: Pulmonary effort is normal.      Breath sounds: Normal breath sounds.   Abdominal:      General: Abdomen is flat.      Palpations: Abdomen is soft.   Musculoskeletal:      Cervical back: Normal range of motion and neck supple.      Right lower leg: No edema.      Left lower leg: No edema.   Skin:     General: Skin is warm and dry.   Neurological:      Mental " Status: She is alert and oriented to person, place, and time.   Psychiatric:         Mood and Affect: Mood normal.         Behavior: Behavior normal.         Lab Review   Appointment on 02/08/2024   Component Date Value Ref Range Status    TSH 3RD GENERATON 02/08/2024 7.724 (H)  0.450 - 4.500 uIU/mL Final    The recommended reference ranges for TSH during pregnancy are as follows:   First trimester 0.100 to 2.500 uIU/mL   Second trimester  0.200 to 3.000 uIU/mL   Third trimester 0.300 to 3.000 uIU/m    Note: Normal ranges may not apply to patients who are transgender, non-binary, or whose legal sex, sex at birth, and gender identity differ.  Adult TSH (3rd generation) reference range follows the recommended guidelines of the American Thyroid Association, January, 2020.    Cholesterol 02/08/2024 216 (H)  See Comment mg/dL Final    Cholesterol:         Pediatric <18 Years        Desirable          <170 mg/dL      Borderline High    170-199 mg/dL      High               >=200 mg/dL        Adult >=18 Years            Desirable         <200 mg/dL      Borderline High   200-239 mg/dL      High              >239 mg/dL      Triglycerides 02/08/2024 127  See Comment mg/dL Final    Triglyceride:     0-9Y            <75mg/dL     10Y-17Y         <90 mg/dL       >=18Y     Normal          <150 mg/dL     Borderline High 150-199 mg/dL     High            200-499 mg/dL        Very High       >499 mg/dL    Specimen collection should occur prior to Metamizole administration due to the potential for falsely depressed results.    HDL, Direct 02/08/2024 58  >=50 mg/dL Final    LDL Calculated 02/08/2024 133 (H)  0 - 100 mg/dL Final    LDL Cholesterol:     Optimal           <100 mg/dl     Near Optimal      100-129 mg/dl     Above Optimal       Borderline High 130-159 mg/dl       High            160-189 mg/dl       Very High       >189 mg/dl         This screening LDL is a calculated result.   It does not have the accuracy of the Direct  "Measured LDL in the monitoring of patients with hyperlipidemia and/or statin therapy.   Direct Measure LDL (YFY826) must be ordered separately in these patients.    Non-HDL-Chol (CHOL-HDL) 02/08/2024 158  mg/dl Final    Free T4 02/08/2024 0.76  0.61 - 1.12 ng/dL Final    Specimens with biotin concentrations > 10 ng/mL can lead to significant (> 10%) positive bias in result.         Geovanna Bruno MD        \"This note has been constructed using a voice recognition system.Therefore there may be syntax, spelling, and/or grammatical errors. Please call if you have any questions. \"  "

## 2024-02-16 ENCOUNTER — OFFICE VISIT (OUTPATIENT)
Dept: FAMILY MEDICINE CLINIC | Facility: CLINIC | Age: 89
End: 2024-02-16
Payer: MEDICARE

## 2024-02-16 VITALS
SYSTOLIC BLOOD PRESSURE: 128 MMHG | HEART RATE: 75 BPM | HEIGHT: 57 IN | OXYGEN SATURATION: 97 % | DIASTOLIC BLOOD PRESSURE: 72 MMHG | BODY MASS INDEX: 28.48 KG/M2 | WEIGHT: 132 LBS

## 2024-02-16 DIAGNOSIS — E03.9 ACQUIRED HYPOTHYROIDISM: Primary | ICD-10-CM

## 2024-02-16 DIAGNOSIS — M15.9 PRIMARY OSTEOARTHRITIS INVOLVING MULTIPLE JOINTS: ICD-10-CM

## 2024-02-16 DIAGNOSIS — N18.31 STAGE 3A CHRONIC KIDNEY DISEASE (HCC): ICD-10-CM

## 2024-02-16 DIAGNOSIS — M81.0 AGE-RELATED OSTEOPOROSIS WITHOUT CURRENT PATHOLOGICAL FRACTURE: ICD-10-CM

## 2024-02-16 DIAGNOSIS — E78.5 DYSLIPIDEMIA: ICD-10-CM

## 2024-02-16 DIAGNOSIS — Z13.0 SCREENING FOR DEFICIENCY ANEMIA: ICD-10-CM

## 2024-02-16 DIAGNOSIS — K21.9 GASTROESOPHAGEAL REFLUX DISEASE WITHOUT ESOPHAGITIS: ICD-10-CM

## 2024-02-16 DIAGNOSIS — I10 PRIMARY HYPERTENSION: ICD-10-CM

## 2024-02-16 DIAGNOSIS — R73.03 PRE-DIABETES: ICD-10-CM

## 2024-02-16 PROBLEM — M54.50 ACUTE RIGHT-SIDED LOW BACK PAIN: Status: RESOLVED | Noted: 2023-09-15 | Resolved: 2024-02-16

## 2024-02-16 PROCEDURE — 99214 OFFICE O/P EST MOD 30 MIN: CPT | Performed by: INTERNAL MEDICINE

## 2024-02-16 RX ORDER — LEVOTHYROXINE SODIUM 0.07 MG/1
75 TABLET ORAL
Qty: 90 TABLET | Refills: 1 | Status: SHIPPED | OUTPATIENT
Start: 2024-02-16

## 2024-02-16 RX ORDER — SIMVASTATIN 10 MG
10 TABLET ORAL
Qty: 90 TABLET | Refills: 3 | Status: SHIPPED | OUTPATIENT
Start: 2024-02-16 | End: 2024-02-16 | Stop reason: SDUPTHER

## 2024-02-16 RX ORDER — SIMVASTATIN 10 MG
10 TABLET ORAL
Qty: 90 TABLET | Refills: 3 | Status: SHIPPED | OUTPATIENT
Start: 2024-02-16

## 2024-02-16 NOTE — ASSESSMENT & PLAN NOTE
Patient with osteoporosis does not want to go on any medication currently taking calcium and vitamin D will continue the same.

## 2024-02-16 NOTE — ASSESSMENT & PLAN NOTE
Lipid profile shows cholesterol of 216 triglycerides 127 LDL at 133 currently she is not taking any medications will resume the simvastatin she has taken in the past 10 mg at bedtime

## 2024-02-16 NOTE — ASSESSMENT & PLAN NOTE
Patient's TSH level is coming up high at 7.72 currently patient is taking 75 mcg of levothyroxine 5 days in a week.  Recommend patient to start taking 1 daily and will follow-up with repeat TSH level in 3 months from now.

## 2024-02-16 NOTE — ASSESSMENT & PLAN NOTE
Patient with GERD symptoms without medication Protonix she is having increasing symptoms we will continue with the same for now.

## 2024-02-16 NOTE — ASSESSMENT & PLAN NOTE
Blood pressure today stable at 128/72 currently on amlodipine 5 mg daily along with HydroDIURIL 12.5 mg and nebivolol 5 mg continue   Palliative Care Follow Up





- Referral


Referring Provider: Dr Cristiano Powell


Time of Visit: 9/7/2018.  11:15 - 11:30


Referral setting: Adult Family Home (Seen in home setting due to taxing and 

considerable effort required to leave the home due to immobility and wheelchair 

bound from left sided weakness secondary to CVA.)


Referral Reason: Face to face for wheelchair





- History of Present Illness Update


Brief HPI Update: 





-Pleasant 84-year-old woman with advancing dementia and L hemiparesis following 

a CVA a year ago, originally at [facility], now living at [facility]. Her R heel 

wound is being managed by Home Health nursing.





-Medical history: Dementia without behaviors, CVA with residual L side 

hemiparesis x 1 year; atrial fibrillation on anticoagulation, CKD III, HLD, HTN

, DM II with insulin dependence, diabetic peripheral neuropathy, R heel 

pressure wound Stage III, osteoarthritis.





-Face to Face for Tilt in Space Wheelchair:





Due to immobility and left-side hemiparesis as a result of CVA, patient is 

completely non-ambulatory inside the home setting ([facility] dementia unit), 

as well as outside of the home setting. As a result of her non-ambulatory status

, patient is therefore unable to accomplish all mobility-related aids to daily 

living activities, such as toileting, feeding, bathing and grooming wihtout the 

assistance of a wheelchair due to left-sided hemiparesis and immobility. Due to 

the effects of left-sided hemiparesis, the patient is completely incapable of 

using a cane or walker inside the home. The patient will be reliant upon a 

wheelchair to accomplish all mobility related aids to daily living in the home 

setting, such as toileting, feeding, bathing and grooming.





Patient has not expressed an unwillingness to use the wheelchair and patient 

does not have sufficient upper extremity function to safely propel a wheelchair

, but has caregiver assistance who is able to assist with the wheelchair 

propulsion. The use of a wheelchair will significantly improve patient's 

ability to succesfully participate in her MRADLs (grooming, bathing, feeding 

and toileting) and as a result, patient's functional mobility deficits will be 

resolved.





See Occupational Therapy visit notes for further details.








Social History





- Living Situation


Living arrangement: Assisted living ([facility] dementia unit)


Living Situation: With caregiver(s)


Support System: 





Jones/Melquiades HORNER, visits every other week with his wife Krystin, they live in 

[place], [place]. Patient's other son, Joaquim, lives in the patient's house.








Medications/Allergies





- Medications


Home Medications: 


 Ambulatory Orders











 Medication  Instructions  Recorded  Confirmed


 


Acetaminophen 650 mg PO TID PRN 04/18/18 08/31/18


 


Atorvastatin Calcium 20 mg PO QPM 04/18/18 08/31/18


 


Bethanechol Chloride 20 mg PO QID 04/18/18 08/31/18


 


Gabapentin 300 mg PO BID 04/18/18 08/31/18


 


Insulin Glargine [Lantus Solostar] 10 units SQ DAILY 04/18/18 08/31/18


 


Mirtazapine 7.5 mg PO DAILY 04/18/18 08/31/18


 


Polyethylene Glycol 3350 [Miralax] 8.5 oz PO BID 04/18/18 08/31/18


 


Tamsulosin [Flomax] 0.4 mg PO DAILY 04/18/18 08/31/18


 


Acetaminophen 650 mg PO Q6H PRN 06/29/18 08/31/18


 


Docusate Sodium 100 mg PO BID 06/29/18 08/31/18


 


Warfarin Sodium 1.25 mg PO .MON, FRI 06/29/18 08/31/18


 


Warfarin Sodium 2.5 mg PO .T,W,TH,S,S 06/29/18 08/31/18














- Allergies


Allergies/Adverse Reactions: 


 Allergies











Allergy/AdvReac Type Severity Reaction Status Date / Time


 


Sulfa (Sulfonamide Allergy  Unknown Verified 06/03/18 19:57





Antibiotics)     














Review of Systems





- Constitutional


Constitutional: reports: Weakness, Weight loss (Small weight gain this past 

month of 1 kilo after a previous weight loss of 7.8% since Jan 2018, 10.9% 

since Dec 2017:   183.4 lbs 9/4/18.  181.2 lbs 8/8/18, 186.4 lbs 7/1/18, 187.8 

bs 6/2/18, 189.4 lbs 4/4/18, 189.4 lbs 3/7/18, 193.6 bs 2//18, 193.4 lbs 1/31/18

, 196.6 lbs 1/11/18, 203.4 lbs 12/14/17.)





- Eyes


Eyes: reports: Vision loss (L side s/p CVA)





- Ears, Nose & Throat


Ears, Nose & Throat: denies: Hearing loss





- Cardiovascular


Cardiovascular: denies: Chest pain





- Respiratory


Respiratory: denies: Cough, SOB at rest





- Gastrointestinal


Gastrointestinal: denies: Constipation





- Musculoskeletal


Musculoskeletal: reports: Joint pain (chronic bilateral hip pain and on L side, 

hemiparesis s/p CVA, previously reported by son), Assistive devices (wheelchair 

bound)





- Neurological


Neurological: reports: Focal weakness (L side hemiparesis)





- Psychiatric


Psychiatric: reports: Depression (on mirtazapine), Anxiety (h/o social anxiety, 

not cofortable in crowds).  denies: Behavior disturbances





- Endocrine


Endocrine: reports: Diabetes type 2 (Controlled  with Lantus), Intolerance to 

cold (chronic complaints of feeling cold)





Physical Exam





- Vital Signs


Temperature: 96.4 F


Pulse Rate: 51


O2 Saturation: 95 (room air)


Blood Pressure: 143/65 (wrist cuff)





- Physical Exam


General Appearance: positive: No acute distress, Alert


Eyes Bilateral: positive: EOMI, No lid inflammation, Conjunctivae nml, No 

scleral icterus


ENT: positive: No signs of dehydration


Neck: positive: Trachea midline


Cardiovascular: positive: Regular rate & rhythm, No murmur, No gallop


Respiratory: positive: Chest non-tender, No respiratory distress.  negative: 

Rales


Skin: positive: Pressure wound (stage I on coccyx)


Extremities: positive: No pedal edema


Neurologic/Psychiatric: positive: Disoriented to place, Disoriented to time





Palliative Care





- POLST


Patient has POLST: Yes


POLST Status: DNR, Comfort Measures


Performance Status: 





Residual L side hemiparesis with LUE pain, from CVA a year ago. Non-ambulatory, 

confined to wheelchair, requires full assist for transfers and ADLs, 

incontinent of bowel and bladder. Able to feed herself with cueing. Articulate 

and speaks in full sentences, maintains eye contact.





- Palliative Care


Discussion: 





Patient is pleasant, alert, cooperative, and confabulates secondary to 

dementia. When asked how her week went, she replies it was great, she has a new 

job as an .








Impression and Recommendations





- Palliative Care


Impression: 





This is a kishan 84-year-old woman with advancing dementia and left hemiparesis 

S/P CVA one year ago, now living at [facility] dementia unit.  She is being 

followed by home health nursing for wound care and also by Occupational 

Therapy. Palliative care will continue to provide oversight and support, with 

transition to hospice when criteria are met.





Recommendations/Counseling Done: 





Left-sided hemiparesis S/P CVA: Recommend tilt-in-space wheelchair for 

immobility s/p CVA. See Occupational Therapy notes for details.





Dementia without behaviors: Stable within a context of steady cognitive and 

functional decline over the past year.  Patient still has a wry sense of humor, 

is articulate, and confabulatory.





Weight loss:  Small weight gain this past month of 1 kilo after a previous 

weight loss of 7.8% since Jan 2018, 10.9% since Dec 2017:   183.4 lbs 9/4/18.  

181.2 lbs 8/8/18, 186.4 lbs 7/1/18, 187.8 bs 6/2/18, 189.4 lbs 4/4/18, 189.4 

lbs 3/7/18, 193.6 bs 2//18, 193.4 lbs 1/31/18, 196.6 lbs 1/11/18, 203.4 lbs 12/ 14/17. Patient has a lifelong self-perception as "fat."





DM 2: Stable.  Continue Lantus 10 units daily. Fasting BGs range from 80s to 

low 100s.





Sacral Stage I pressure wound on coccyx: Managed by HH RN.





R heel pressure wound: Resolving. Patient resists wound care when awake,  

nursing is able to provide the wound care most of time while patient is 

sleeping and this is working well. Facility nursing will monitor, if the patient

's resistance becomes a problem, will discuss with ARNP the addition of pain 

medication prior to wound care, but at present, that is not indicated.





Advanced care planning:  Comfort and focus on quality of life. No transfer to 

hospital.





Follow up 4-6 weeks and as needed.





Time Spent: 





15 minutes were spent with more than 50% of the time related to coordination of 

care relating to the need for a wheelchair.

## 2024-02-16 NOTE — ASSESSMENT & PLAN NOTE
Lab Results   Component Value Date    EGFR 45 07/17/2023    EGFR 74 01/17/2023    EGFR 74 01/16/2023    CREATININE 1.04 07/17/2023    CREATININE 0.67 01/17/2023    CREATININE 0.69 01/16/2023   We will follow-up with repeat BMP

## 2024-04-08 ENCOUNTER — TELEPHONE (OUTPATIENT)
Dept: FAMILY MEDICINE CLINIC | Facility: CLINIC | Age: 89
End: 2024-04-08

## 2024-04-18 DIAGNOSIS — E03.9 ACQUIRED HYPOTHYROIDISM: ICD-10-CM

## 2024-04-19 RX ORDER — LEVOTHYROXINE SODIUM 0.07 MG/1
75 TABLET ORAL
Qty: 90 TABLET | Refills: 1 | Status: SHIPPED | OUTPATIENT
Start: 2024-04-19

## 2024-05-16 ENCOUNTER — APPOINTMENT (OUTPATIENT)
Dept: LAB | Facility: CLINIC | Age: 89
End: 2024-05-16
Payer: MEDICARE

## 2024-05-16 DIAGNOSIS — E78.5 DYSLIPIDEMIA: ICD-10-CM

## 2024-05-16 DIAGNOSIS — Z13.0 SCREENING FOR DEFICIENCY ANEMIA: ICD-10-CM

## 2024-05-16 DIAGNOSIS — E03.9 ACQUIRED HYPOTHYROIDISM: ICD-10-CM

## 2024-05-16 DIAGNOSIS — R73.03 PRE-DIABETES: ICD-10-CM

## 2024-05-16 LAB
ALBUMIN SERPL BCP-MCNC: 4.2 G/DL (ref 3.5–5)
ALP SERPL-CCNC: 39 U/L (ref 34–104)
ALT SERPL W P-5'-P-CCNC: 13 U/L (ref 7–52)
ANION GAP SERPL CALCULATED.3IONS-SCNC: 7 MMOL/L (ref 4–13)
AST SERPL W P-5'-P-CCNC: 18 U/L (ref 13–39)
BACTERIA UR QL AUTO: ABNORMAL /HPF
BASOPHILS # BLD AUTO: 0.02 THOUSANDS/ÂΜL (ref 0–0.1)
BASOPHILS NFR BLD AUTO: 1 % (ref 0–1)
BILIRUB SERPL-MCNC: 0.61 MG/DL (ref 0.2–1)
BILIRUB UR QL STRIP: NEGATIVE
BUN SERPL-MCNC: 35 MG/DL (ref 5–25)
CALCIUM SERPL-MCNC: 9.8 MG/DL (ref 8.4–10.2)
CHLORIDE SERPL-SCNC: 103 MMOL/L (ref 96–108)
CHOLEST SERPL-MCNC: 148 MG/DL
CLARITY UR: ABNORMAL
CO2 SERPL-SCNC: 32 MMOL/L (ref 21–32)
COLOR UR: YELLOW
CREAT SERPL-MCNC: 1.02 MG/DL (ref 0.6–1.3)
EOSINOPHIL # BLD AUTO: 0.13 THOUSAND/ÂΜL (ref 0–0.61)
EOSINOPHIL NFR BLD AUTO: 3 % (ref 0–6)
ERYTHROCYTE [DISTWIDTH] IN BLOOD BY AUTOMATED COUNT: 14.2 % (ref 11.6–15.1)
EST. AVERAGE GLUCOSE BLD GHB EST-MCNC: 103 MG/DL
GFR SERPL CREATININE-BSD FRML MDRD: 46 ML/MIN/1.73SQ M
GLUCOSE P FAST SERPL-MCNC: 102 MG/DL (ref 65–99)
GLUCOSE UR STRIP-MCNC: ABNORMAL MG/DL
GRAN CASTS #/AREA URNS LPF: ABNORMAL /[LPF]
HBA1C MFR BLD: 5.2 %
HCT VFR BLD AUTO: 37.8 % (ref 34.8–46.1)
HDLC SERPL-MCNC: 51 MG/DL
HGB BLD-MCNC: 12.1 G/DL (ref 11.5–15.4)
HGB UR QL STRIP.AUTO: NEGATIVE
HYALINE CASTS #/AREA URNS LPF: ABNORMAL /LPF
IMM GRANULOCYTES # BLD AUTO: 0.01 THOUSAND/UL (ref 0–0.2)
IMM GRANULOCYTES NFR BLD AUTO: 0 % (ref 0–2)
KETONES UR STRIP-MCNC: NEGATIVE MG/DL
LDLC SERPL CALC-MCNC: 71 MG/DL (ref 0–100)
LEUKOCYTE ESTERASE UR QL STRIP: ABNORMAL
LYMPHOCYTES # BLD AUTO: 1.86 THOUSANDS/ÂΜL (ref 0.6–4.47)
LYMPHOCYTES NFR BLD AUTO: 45 % (ref 14–44)
MCH RBC QN AUTO: 30.9 PG (ref 26.8–34.3)
MCHC RBC AUTO-ENTMCNC: 32 G/DL (ref 31.4–37.4)
MCV RBC AUTO: 97 FL (ref 82–98)
MONOCYTES # BLD AUTO: 0.3 THOUSAND/ÂΜL (ref 0.17–1.22)
MONOCYTES NFR BLD AUTO: 7 % (ref 4–12)
MUCOUS THREADS UR QL AUTO: ABNORMAL
NEUTROPHILS # BLD AUTO: 1.82 THOUSANDS/ÂΜL (ref 1.85–7.62)
NEUTS SEG NFR BLD AUTO: 44 % (ref 43–75)
NITRITE UR QL STRIP: NEGATIVE
NON-SQ EPI CELLS URNS QL MICRO: ABNORMAL /HPF
NONHDLC SERPL-MCNC: 97 MG/DL
NRBC BLD AUTO-RTO: 0 /100 WBCS
PH UR STRIP.AUTO: 5.5 [PH]
PLATELET # BLD AUTO: 162 THOUSANDS/UL (ref 149–390)
PMV BLD AUTO: 13.7 FL (ref 8.9–12.7)
POTASSIUM SERPL-SCNC: 4.7 MMOL/L (ref 3.5–5.3)
PROT SERPL-MCNC: 6.8 G/DL (ref 6.4–8.4)
PROT UR STRIP-MCNC: ABNORMAL MG/DL
RBC # BLD AUTO: 3.91 MILLION/UL (ref 3.81–5.12)
RBC #/AREA URNS AUTO: ABNORMAL /HPF
SODIUM SERPL-SCNC: 142 MMOL/L (ref 135–147)
SP GR UR STRIP.AUTO: >=1.03 (ref 1–1.03)
TRANS CELLS #/AREA URNS HPF: PRESENT /[HPF]
TRIGL SERPL-MCNC: 129 MG/DL
TSH SERPL DL<=0.05 MIU/L-ACNC: 1 UIU/ML (ref 0.45–4.5)
UROBILINOGEN UR STRIP-ACNC: <2 MG/DL
WBC # BLD AUTO: 4.14 THOUSAND/UL (ref 4.31–10.16)
WBC #/AREA URNS AUTO: ABNORMAL /HPF

## 2024-05-16 PROCEDURE — 80053 COMPREHEN METABOLIC PANEL: CPT

## 2024-05-16 PROCEDURE — 81001 URINALYSIS AUTO W/SCOPE: CPT

## 2024-05-16 PROCEDURE — 87086 URINE CULTURE/COLONY COUNT: CPT

## 2024-05-16 PROCEDURE — 83036 HEMOGLOBIN GLYCOSYLATED A1C: CPT

## 2024-05-16 PROCEDURE — 85025 COMPLETE CBC W/AUTO DIFF WBC: CPT

## 2024-05-16 PROCEDURE — 84443 ASSAY THYROID STIM HORMONE: CPT

## 2024-05-16 PROCEDURE — 36415 COLL VENOUS BLD VENIPUNCTURE: CPT

## 2024-05-16 PROCEDURE — 80061 LIPID PANEL: CPT

## 2024-05-18 LAB — BACTERIA UR CULT: NORMAL

## 2024-05-23 ENCOUNTER — OFFICE VISIT (OUTPATIENT)
Dept: FAMILY MEDICINE CLINIC | Facility: CLINIC | Age: 89
End: 2024-05-23
Payer: MEDICARE

## 2024-05-23 VITALS
HEART RATE: 63 BPM | WEIGHT: 130.8 LBS | SYSTOLIC BLOOD PRESSURE: 126 MMHG | OXYGEN SATURATION: 97 % | HEIGHT: 57 IN | BODY MASS INDEX: 28.22 KG/M2 | DIASTOLIC BLOOD PRESSURE: 72 MMHG

## 2024-05-23 DIAGNOSIS — I10 PRIMARY HYPERTENSION: ICD-10-CM

## 2024-05-23 DIAGNOSIS — E78.5 DYSLIPIDEMIA: ICD-10-CM

## 2024-05-23 DIAGNOSIS — M81.0 AGE-RELATED OSTEOPOROSIS WITHOUT CURRENT PATHOLOGICAL FRACTURE: ICD-10-CM

## 2024-05-23 DIAGNOSIS — E03.9 ACQUIRED HYPOTHYROIDISM: ICD-10-CM

## 2024-05-23 DIAGNOSIS — K21.9 GASTROESOPHAGEAL REFLUX DISEASE WITHOUT ESOPHAGITIS: ICD-10-CM

## 2024-05-23 DIAGNOSIS — N18.31 STAGE 3A CHRONIC KIDNEY DISEASE (HCC): ICD-10-CM

## 2024-05-23 DIAGNOSIS — M15.9 PRIMARY OSTEOARTHRITIS INVOLVING MULTIPLE JOINTS: ICD-10-CM

## 2024-05-23 DIAGNOSIS — D70.8 OTHER NEUTROPENIA (HCC): Primary | ICD-10-CM

## 2024-05-23 DIAGNOSIS — Z13.0 SCREENING FOR DEFICIENCY ANEMIA: ICD-10-CM

## 2024-05-23 PROCEDURE — 99214 OFFICE O/P EST MOD 30 MIN: CPT | Performed by: INTERNAL MEDICINE

## 2024-05-23 PROCEDURE — G2211 COMPLEX E/M VISIT ADD ON: HCPCS | Performed by: INTERNAL MEDICINE

## 2024-05-23 NOTE — ASSESSMENT & PLAN NOTE
Patient has osteoporosis we discussed in the past regarding medication but patient is not willing to go through that he is currently taking calcium tablets.  Does not want another DEXA scan.  Will monitor recommend very strongly to watch with ambulation always use the walker

## 2024-05-23 NOTE — ASSESSMENT & PLAN NOTE
Blood pressure is stable at 126/72 on amlodipine 5 mg daily HydroDIURIL 12.5 mg daily and nebivolol 5 mg daily continue

## 2024-05-23 NOTE — ASSESSMENT & PLAN NOTE
Patient is on Protonix 40 mg daily will continue if she does not take the pill she gets increasing heartburn symptoms.

## 2024-05-23 NOTE — ASSESSMENT & PLAN NOTE
Patient is currently taking 75 mcg of levothyroxine TSH level came back normal on May 16, 2024 at 0.998 we will continue the same dose

## 2024-05-23 NOTE — ASSESSMENT & PLAN NOTE
Currently patient is taking simvastatin 10 mg at bedtime lipid profile done on May 16 cholesterol 148 triglycerides 129 HDL 51 LDL at 71 these are better readings compared to the previous 1.

## 2024-05-23 NOTE — PROGRESS NOTES
Office Visit Note  24     Nisreen Parrish 89 y.o. female MRN: [id number]  : 1927    Assessment:     1. Other neutropenia (HCC)  Assessment & Plan:  WBC count is at 4.14 and lymphocyte count is 45 neutrophils at 1.82 thousands per microliter we will get a repeat CBC  2. Acquired hypothyroidism  Assessment & Plan:  Patient is currently taking 75 mcg of levothyroxine TSH level came back normal on May 16, 2024 at 0.998 we will continue the same dose  3. Age-related osteoporosis without current pathological fracture  Assessment & Plan:  Patient has osteoporosis we discussed in the past regarding medication but patient is not willing to go through that he is currently taking calcium tablets.  Does not want another DEXA scan.  Will monitor recommend very strongly to watch with ambulation always use the walker  4. Dyslipidemia  Assessment & Plan:  Currently patient is taking simvastatin 10 mg at bedtime lipid profile done on May 16 cholesterol 148 triglycerides 129 HDL 51 LDL at 71 these are better readings compared to the previous 1.  5. Gastroesophageal reflux disease without esophagitis  Assessment & Plan:  Patient is on Protonix 40 mg daily will continue if she does not take the pill she gets increasing heartburn symptoms.  6. Primary osteoarthritis involving multiple joints  Assessment & Plan:  Complains of aches and pains in the knee joints was seen in the past by the orthopedic currently she is taking occasionally Aleve recommend very strongly to avoid nonsteroidals because of her kidney function just take Tylenol.  7. Primary hypertension  Assessment & Plan:  Blood pressure is stable at 126/72 on amlodipine 5 mg daily HydroDIURIL 12.5 mg daily and nebivolol 5 mg daily continue  8. Stage 3a chronic kidney disease (HCC)  Assessment & Plan:  Lab Results   Component Value Date    EGFR 46 2024    EGFR 45 2023    EGFR 74 2023    CREATININE 1.02 2024    CREATININE 1.04 2023     CREATININE 0.67 01/17/2023   Patient GFR is 46 with a creatinine of 1.02 almost same as before again recommend very strongly not to take nonsteroidals  9. Screening for deficiency anemia  -     CBC and differential; Future             Discussion Summary and Plan:  Today's care plan and medications were reviewed with patient in detail and all their questions answered to their satisfaction.    Chief Complaint   Patient presents with   • Follow-up      Subjective:  Patient is coming for a follow-up evaluation with regards to her symptoms of hypothyroidism hypertension dyslipidemia chronic renal failure.  Complains of aches and pains in the joints he takes occasional Aleve for the same.  No chest pains palpitations or shortness of breath.  Medications reviewed patient had lab work have reviewed in detail.        The following portions of the patient's history were reviewed and updated as appropriate: allergies, current medications, past family history, past medical history, past social history, past surgical history and problem list.    Review of Systems   Constitutional:  Negative for chills and fever.   HENT:  Negative for ear pain and sore throat.    Eyes:  Negative for pain and visual disturbance.   Respiratory:  Negative for cough and shortness of breath.    Cardiovascular:  Negative for chest pain and palpitations.   Gastrointestinal:  Negative for abdominal pain and vomiting.   Genitourinary:  Negative for dysuria and hematuria.   Musculoskeletal:  Negative for arthralgias and back pain.   Skin:  Negative for color change and rash.   Neurological:  Negative for seizures and syncope.   All other systems reviewed and are negative.        Historical Information   Patient Active Problem List   Diagnosis   • Primary hypertension   • Dyslipidemia   • Acquired hypothyroidism   • Gastroesophageal reflux disease without esophagitis   • Osteoarthritis of multiple joints   • Age-related osteoporosis without current  pathological fracture   • Stage 3a chronic kidney disease (HCC)   • Upper respiratory tract infection   • Other neutropenia (HCC)     Past Medical History:   Diagnosis Date   • Acid reflux    • Arthritis    • DDD (degenerative disc disease), lumbar    • Disease of thyroid gland     hypo   • DJD (degenerative joint disease)    • History of transfusion     many years ago-1940s (after fetal demise-very early pregnancy)   • Hyperlipidemia    • Hypertension    • Osteoporosis    • Restless leg      Past Surgical History:   Procedure Laterality Date   • APPENDECTOMY     • BREAST SURGERY Left     biopsy   • CATARACT EXTRACTION W/ INTRAOCULAR LENS IMPLANT Left 10/24/2016    Procedure: EXTRACTION EXTRACAPSULAR CATARACT PHACO INTRAOCULAR LENS (IOL);  Surgeon: Aaron Meza MD;  Location: [facility] MAIN OR;  Service:    • DILATION AND CURETTAGE OF UTERUS         • DXA PROCEDURE (HISTORICAL)  2020   • HEMORRHOID SURGERY     • HYSTERECTOMY  1950    with right oophorectomy   • NV XCAPSL CTRC RMVL INSJ IO LENS PROSTH W/O ECP Right 2016    Procedure: EXTRACTION EXTRACAPSULAR CATARACT PHACO INTRAOCULAR LENS (IOL);  Surgeon: Aaron Meza MD;  Location: [facility] MAIN OR;  Service: Ophthalmology   • SKIN BIOPSY      exc of the back-   • TOE SURGERY Right     great toe removal of bone, and between the toes cyst removal   • TONSILLECTOMY     • TUBAL LIGATION       Social History     Substance and Sexual Activity   Alcohol Use Never     Social History     Substance and Sexual Activity   Drug Use Never     Social History     Tobacco Use   Smoking Status Former   • Current packs/day: 0.00   • Types: Cigarettes   • Quit date:    • Years since quittin.4   • Passive exposure: Past   Smokeless Tobacco Never   Tobacco Comments    social     Family History   Problem Relation Age of Onset   • Heart disease Father 54        MI     Health Maintenance Due   Topic   • Zoster Vaccine (1 of 2)   • RSV Vaccine  "Age 60+ Years (1 - 1-dose 60+ series)   • Pneumococcal Vaccine: 65+ Years (1 of 1 - PCV)   • COVID-19 Vaccine (4 - 2023-24 season)   • Depression Screening       Meds/Allergies       Current Outpatient Medications:   •  amLODIPine (NORVASC) 5 mg tablet, Take 1 tablet (5 mg total) by mouth in the morning, Disp: 90 tablet, Rfl: 1  •  baclofen 10 mg tablet, Take 0.5 tablets (5 mg total) by mouth 3 (three) times a day as needed for muscle spasms, Disp: 20 tablet, Rfl: 0  •  hydrochlorothiazide (HYDRODIURIL) 12.5 mg tablet, Take 1 tablet (12.5 mg total) by mouth every morning, Disp: 90 tablet, Rfl: 1  •  levothyroxine 75 mcg tablet, Take 1 tablet (75 mcg total) by mouth daily in the early morning Start taking levothyroxine 75 mcg daily, Disp: 90 tablet, Rfl: 1  •  lidocaine (Lidoderm) 5 %, Apply 1 patch topically over 12 hours daily Remove & Discard patch within 12 hours or as directed by MD, Disp: 15 patch, Rfl: 0  •  nebivolol (BYSTOLIC) 5 mg tablet, Take 1 tablet (5 mg total) by mouth every morning, Disp: 90 tablet, Rfl: 1  •  Omega-3 Fatty Acids (FISH OIL) 1200 MG CAPS, Take by mouth 2 (two) times a day, Disp: , Rfl:   •  pantoprazole (PROTONIX) 40 mg tablet, Take 1 tablet (40 mg total) by mouth every morning, Disp: 90 tablet, Rfl: 1  •  potassium chloride (Klor-Con) 10 mEq tablet, Take 1 tablet (10 mEq total) by mouth every morning, Disp: 90 tablet, Rfl: 1  •  simvastatin (ZOCOR) 10 mg tablet, Take 1 tablet (10 mg total) by mouth daily at bedtime, Disp: 90 tablet, Rfl: 3      Objective:    Vitals:   /72 (BP Location: Right arm, Patient Position: Sitting, Cuff Size: Standard)   Pulse 63   Ht 4' 9\" (1.448 m)   Wt 59.3 kg (130 lb 12.8 oz)   SpO2 97%   BMI 28.30 kg/m²   Body mass index is 28.3 kg/m².  Vitals:    05/23/24 1042   Weight: 59.3 kg (130 lb 12.8 oz)       Physical Exam  Vitals and nursing note reviewed.   Constitutional:       Appearance: Normal appearance.   Cardiovascular:      Rate and Rhythm: " Normal rate and regular rhythm.      Heart sounds: Normal heart sounds.   Pulmonary:      Effort: Pulmonary effort is normal.      Breath sounds: Normal breath sounds.   Abdominal:      Palpations: Abdomen is soft.   Musculoskeletal:      Cervical back: Normal range of motion and neck supple.      Right lower leg: No edema.      Left lower leg: No edema.      Comments: Movements of the knee joints hip joints and the low back movements causing discomfort and pain   Skin:     General: Skin is warm and dry.   Neurological:      Mental Status: She is alert and oriented to person, place, and time.   Psychiatric:         Mood and Affect: Mood normal.         Behavior: Behavior normal.         Lab Review   Appointment on 05/16/2024   Component Date Value Ref Range Status   • WBC 05/16/2024 4.14 (L)  4.31 - 10.16 Thousand/uL Final   • RBC 05/16/2024 3.91  3.81 - 5.12 Million/uL Final   • Hemoglobin 05/16/2024 12.1  11.5 - 15.4 g/dL Final   • Hematocrit 05/16/2024 37.8  34.8 - 46.1 % Final   • MCV 05/16/2024 97  82 - 98 fL Final   • MCH 05/16/2024 30.9  26.8 - 34.3 pg Final   • MCHC 05/16/2024 32.0  31.4 - 37.4 g/dL Final   • RDW 05/16/2024 14.2  11.6 - 15.1 % Final   • MPV 05/16/2024 13.7 (H)  8.9 - 12.7 fL Final   • Platelets 05/16/2024 162  149 - 390 Thousands/uL Final   • nRBC 05/16/2024 0  /100 WBCs Final   • Segmented % 05/16/2024 44  43 - 75 % Final   • Immature Grans % 05/16/2024 0  0 - 2 % Final   • Lymphocytes % 05/16/2024 45 (H)  14 - 44 % Final   • Monocytes % 05/16/2024 7  4 - 12 % Final   • Eosinophils Relative 05/16/2024 3  0 - 6 % Final   • Basophils Relative 05/16/2024 1  0 - 1 % Final   • Absolute Neutrophils 05/16/2024 1.82 (L)  1.85 - 7.62 Thousands/µL Final   • Absolute Immature Grans 05/16/2024 0.01  0.00 - 0.20 Thousand/uL Final   • Absolute Lymphocytes 05/16/2024 1.86  0.60 - 4.47 Thousands/µL Final   • Absolute Monocytes 05/16/2024 0.30  0.17 - 1.22 Thousand/µL Final   • Eosinophils Absolute  05/16/2024 0.13  0.00 - 0.61 Thousand/µL Final   • Basophils Absolute 05/16/2024 0.02  0.00 - 0.10 Thousands/µL Final   • Sodium 05/16/2024 142  135 - 147 mmol/L Final   • Potassium 05/16/2024 4.7  3.5 - 5.3 mmol/L Final   • Chloride 05/16/2024 103  96 - 108 mmol/L Final   • CO2 05/16/2024 32  21 - 32 mmol/L Final   • ANION GAP 05/16/2024 7  4 - 13 mmol/L Final   • BUN 05/16/2024 35 (H)  5 - 25 mg/dL Final   • Creatinine 05/16/2024 1.02  0.60 - 1.30 mg/dL Final    Standardized to IDMS reference method   • Glucose, Fasting 05/16/2024 102 (H)  65 - 99 mg/dL Final   • Calcium 05/16/2024 9.8  8.4 - 10.2 mg/dL Final   • AST 05/16/2024 18  13 - 39 U/L Final   • ALT 05/16/2024 13  7 - 52 U/L Final    Specimen collection should occur prior to Sulfasalazine administration due to the potential for falsely depressed results.    • Alkaline Phosphatase 05/16/2024 39  34 - 104 U/L Final   • Total Protein 05/16/2024 6.8  6.4 - 8.4 g/dL Final   • Albumin 05/16/2024 4.2  3.5 - 5.0 g/dL Final   • Total Bilirubin 05/16/2024 0.61  0.20 - 1.00 mg/dL Final    Use of this assay is not recommended for patients undergoing treatment with eltrombopag due to the potential for falsely elevated results.  N-acetyl-p-benzoquinone imine (metabolite of Acetaminophen) will generate erroneously low results in samples for patients that have taken an overdose of Acetaminophen.   • eGFR 05/16/2024 46  ml/min/1.73sq m Final   • Hemoglobin A1C 05/16/2024 5.2  Normal 4.0-5.6%; PreDiabetic 5.7-6.4%; Diabetic >=6.5%; Glycemic control for adults with diabetes <7.0% % Final   • EAG 05/16/2024 103  mg/dl Final   • Cholesterol 05/16/2024 148  See Comment mg/dL Final    Cholesterol:         Pediatric <18 Years        Desirable          <170 mg/dL      Borderline High    170-199 mg/dL      High               >=200 mg/dL        Adult >=18 Years            Desirable         <200 mg/dL      Borderline High   200-239 mg/dL      High              >239 mg/dL     •  Triglycerides 05/16/2024 129  See Comment mg/dL Final    Triglyceride:     0-9Y            <75mg/dL     10Y-17Y         <90 mg/dL       >=18Y     Normal          <150 mg/dL     Borderline High 150-199 mg/dL     High            200-499 mg/dL        Very High       >499 mg/dL    Specimen collection should occur prior to Metamizole administration due to the potential for falsely depressed results.   • HDL, Direct 05/16/2024 51  >=50 mg/dL Final   • LDL Calculated 05/16/2024 71  0 - 100 mg/dL Final    LDL Cholesterol:     Optimal           <100 mg/dl     Near Optimal      100-129 mg/dl     Above Optimal       Borderline High 130-159 mg/dl       High            160-189 mg/dl       Very High       >189 mg/dl         This screening LDL is a calculated result.   It does not have the accuracy of the Direct Measured LDL in the monitoring of patients with hyperlipidemia and/or statin therapy.   Direct Measure LDL ([id number]) must be ordered separately in these patients.   • Non-HDL-Chol (CHOL-HDL) 05/16/2024 97  mg/dl Final   • TSH 3RD GENERATON 05/16/2024 0.998  0.450 - 4.500 uIU/mL Final    The recommended reference ranges for TSH during pregnancy are as follows:   First trimester 0.100 to 2.500 uIU/mL   Second trimester  0.200 to 3.000 uIU/mL   Third trimester 0.300 to 3.000 uIU/m    Note: Normal ranges may not apply to patients who are transgender, non-binary, or whose legal sex, sex at birth, and gender identity differ.  Adult TSH (3rd generation) reference range follows the recommended guidelines of the [employer], January, 2020.   • Color, UA 05/16/2024 Yellow   Final   • Clarity, UA 05/16/2024 Cloudy   Final   • Specific Gravity, UA 05/16/2024 >=1.030  1.005 - 1.030 Final   • pH, UA 05/16/2024 5.5  4.5, 5.0, 5.5, 6.0, 6.5, 7.0, 7.5, 8.0 Final   • Leukocytes, UA 05/16/2024 Large (A)  Negative Final   • Nitrite, UA 05/16/2024 Negative  Negative Final   • Protein, UA 05/16/2024 Trace (A)  Negative mg/dl  "Final   • Glucose, UA 05/16/2024 30 (3/100%) (A)  Negative mg/dl Final   • Ketones, UA 05/16/2024 Negative  Negative mg/dl Final   • Urobilinogen, UA 05/16/2024 <2.0  <2.0 mg/dl mg/dl Final   • Bilirubin, UA 05/16/2024 Negative  Negative Final   • Occult Blood, UA 05/16/2024 Negative  Negative Final   • RBC, UA 05/16/2024 1-2  None Seen, 1-2 /hpf Final   • WBC, UA 05/16/2024 10-20 (A)  None Seen, 1-2 /hpf Final   • Epithelial Cells 05/16/2024 Occasional  None Seen, Occasional /hpf Final   • Bacteria, UA 05/16/2024 Occasional  None Seen, Occasional /hpf Final   • MUCUS THREADS 05/16/2024 Moderate (A)  None Seen Final   • Hyaline Casts, UA 05/16/2024 5-10 (A)  None Seen /lpf Final   • Granular Casts, UA 05/16/2024 3-5 (A)  (none) Final   • Transitional Epithelial Cells 05/16/2024 Present   Final   • Urine Culture 05/16/2024 50,000-59,000 cfu/ml   Final    Mixed Contaminants X3         Geovanna Bruno MD        \"This note has been constructed using a voice recognition system.Therefore there may be syntax, spelling, and/or grammatical errors. Please call if you have any questions. \"  "

## 2024-05-23 NOTE — ASSESSMENT & PLAN NOTE
Complains of aches and pains in the knee joints was seen in the past by the orthopedic currently she is taking occasionally Aleve recommend very strongly to avoid nonsteroidals because of her kidney function just take Tylenol.

## 2024-05-23 NOTE — ASSESSMENT & PLAN NOTE
Lab Results   Component Value Date    EGFR 46 05/16/2024    EGFR 45 07/17/2023    EGFR 74 01/17/2023    CREATININE 1.02 05/16/2024    CREATININE 1.04 07/17/2023    CREATININE 0.67 01/17/2023   Patient GFR is 46 with a creatinine of 1.02 almost same as before again recommend very strongly not to take nonsteroidals

## 2024-05-23 NOTE — ASSESSMENT & PLAN NOTE
WBC count is at 4.14 and lymphocyte count is 45 neutrophils at 1.82 thousands per microliter we will get a repeat CBC

## 2024-07-09 DIAGNOSIS — E78.5 DYSLIPIDEMIA: ICD-10-CM

## 2024-07-09 DIAGNOSIS — I10 PRIMARY HYPERTENSION: ICD-10-CM

## 2024-07-09 DIAGNOSIS — K21.9 GASTROESOPHAGEAL REFLUX DISEASE WITHOUT ESOPHAGITIS: ICD-10-CM

## 2024-07-09 DIAGNOSIS — E03.9 ACQUIRED HYPOTHYROIDISM: ICD-10-CM

## 2024-07-09 RX ORDER — HYDROCHLOROTHIAZIDE 12.5 MG/1
12.5 TABLET ORAL EVERY MORNING
Qty: 90 TABLET | Refills: 1 | Status: SHIPPED | OUTPATIENT
Start: 2024-07-09

## 2024-07-09 RX ORDER — NEBIVOLOL 5 MG/1
5 TABLET ORAL EVERY MORNING
Qty: 90 TABLET | Refills: 1 | Status: SHIPPED | OUTPATIENT
Start: 2024-07-09

## 2024-07-09 RX ORDER — LEVOTHYROXINE SODIUM 0.07 MG/1
75 TABLET ORAL
Qty: 90 TABLET | Refills: 1 | Status: SHIPPED | OUTPATIENT
Start: 2024-07-09

## 2024-07-09 RX ORDER — PANTOPRAZOLE SODIUM 40 MG/1
40 TABLET, DELAYED RELEASE ORAL EVERY MORNING
Qty: 90 TABLET | Refills: 1 | Status: SHIPPED | OUTPATIENT
Start: 2024-07-09

## 2024-07-09 RX ORDER — POTASSIUM CHLORIDE 750 MG/1
10 TABLET, FILM COATED, EXTENDED RELEASE ORAL EVERY MORNING
Qty: 90 TABLET | Refills: 1 | Status: SHIPPED | OUTPATIENT
Start: 2024-07-09

## 2024-07-09 RX ORDER — SIMVASTATIN 10 MG
10 TABLET ORAL
Qty: 90 TABLET | Refills: 3 | Status: SHIPPED | OUTPATIENT
Start: 2024-07-09

## 2024-07-09 RX ORDER — AMLODIPINE BESYLATE 5 MG/1
5 TABLET ORAL DAILY
Qty: 90 TABLET | Refills: 1 | Status: SHIPPED | OUTPATIENT
Start: 2024-07-09

## 2024-08-13 ENCOUNTER — APPOINTMENT (OUTPATIENT)
Dept: LAB | Facility: CLINIC | Age: 89
End: 2024-08-13
Payer: MEDICARE

## 2024-08-13 DIAGNOSIS — Z13.0 SCREENING FOR DEFICIENCY ANEMIA: ICD-10-CM

## 2024-08-13 LAB
BASOPHILS # BLD AUTO: 0.02 THOUSANDS/ÂΜL (ref 0–0.1)
BASOPHILS NFR BLD AUTO: 0 % (ref 0–1)
EOSINOPHIL # BLD AUTO: 0.11 THOUSAND/ÂΜL (ref 0–0.61)
EOSINOPHIL NFR BLD AUTO: 2 % (ref 0–6)
ERYTHROCYTE [DISTWIDTH] IN BLOOD BY AUTOMATED COUNT: 14.6 % (ref 11.6–15.1)
HCT VFR BLD AUTO: 34.7 % (ref 34.8–46.1)
HGB BLD-MCNC: 11.2 G/DL (ref 11.5–15.4)
IMM GRANULOCYTES # BLD AUTO: 0.03 THOUSAND/UL (ref 0–0.2)
IMM GRANULOCYTES NFR BLD AUTO: 1 % (ref 0–2)
LYMPHOCYTES # BLD AUTO: 1.71 THOUSANDS/ÂΜL (ref 0.6–4.47)
LYMPHOCYTES NFR BLD AUTO: 36 % (ref 14–44)
MCH RBC QN AUTO: 31.7 PG (ref 26.8–34.3)
MCHC RBC AUTO-ENTMCNC: 32.3 G/DL (ref 31.4–37.4)
MCV RBC AUTO: 98 FL (ref 82–98)
MONOCYTES # BLD AUTO: 0.37 THOUSAND/ÂΜL (ref 0.17–1.22)
MONOCYTES NFR BLD AUTO: 8 % (ref 4–12)
NEUTROPHILS # BLD AUTO: 2.52 THOUSANDS/ÂΜL (ref 1.85–7.62)
NEUTS SEG NFR BLD AUTO: 53 % (ref 43–75)
NRBC BLD AUTO-RTO: 0 /100 WBCS
PLATELET # BLD AUTO: 157 THOUSANDS/UL (ref 149–390)
PMV BLD AUTO: 13.6 FL (ref 8.9–12.7)
RBC # BLD AUTO: 3.53 MILLION/UL (ref 3.81–5.12)
WBC # BLD AUTO: 4.76 THOUSAND/UL (ref 4.31–10.16)

## 2024-08-13 PROCEDURE — 85025 COMPLETE CBC W/AUTO DIFF WBC: CPT

## 2024-08-13 PROCEDURE — 36415 COLL VENOUS BLD VENIPUNCTURE: CPT

## 2024-08-21 ENCOUNTER — OFFICE VISIT (OUTPATIENT)
Dept: FAMILY MEDICINE CLINIC | Facility: CLINIC | Age: 89
End: 2024-08-21
Payer: MEDICARE

## 2024-08-21 VITALS
SYSTOLIC BLOOD PRESSURE: 154 MMHG | OXYGEN SATURATION: 99 % | HEIGHT: 57 IN | DIASTOLIC BLOOD PRESSURE: 64 MMHG | BODY MASS INDEX: 27.83 KG/M2 | TEMPERATURE: 97.9 F | RESPIRATION RATE: 15 BRPM | HEART RATE: 58 BPM | WEIGHT: 129 LBS

## 2024-08-21 DIAGNOSIS — M81.0 AGE-RELATED OSTEOPOROSIS WITHOUT CURRENT PATHOLOGICAL FRACTURE: ICD-10-CM

## 2024-08-21 DIAGNOSIS — D70.8 OTHER NEUTROPENIA (HCC): ICD-10-CM

## 2024-08-21 DIAGNOSIS — M15.9 PRIMARY OSTEOARTHRITIS INVOLVING MULTIPLE JOINTS: ICD-10-CM

## 2024-08-21 DIAGNOSIS — N18.31 STAGE 3A CHRONIC KIDNEY DISEASE (HCC): ICD-10-CM

## 2024-08-21 DIAGNOSIS — E03.9 ACQUIRED HYPOTHYROIDISM: ICD-10-CM

## 2024-08-21 DIAGNOSIS — K21.9 GASTROESOPHAGEAL REFLUX DISEASE WITHOUT ESOPHAGITIS: ICD-10-CM

## 2024-08-21 DIAGNOSIS — E78.5 DYSLIPIDEMIA: ICD-10-CM

## 2024-08-21 DIAGNOSIS — I10 PRIMARY HYPERTENSION: Primary | ICD-10-CM

## 2024-08-21 PROCEDURE — G2211 COMPLEX E/M VISIT ADD ON: HCPCS | Performed by: INTERNAL MEDICINE

## 2024-08-21 PROCEDURE — 99214 OFFICE O/P EST MOD 30 MIN: CPT | Performed by: INTERNAL MEDICINE

## 2024-08-21 RX ORDER — POTASSIUM CHLORIDE 750 MG/1
10 TABLET, EXTENDED RELEASE ORAL DAILY
COMMUNITY
Start: 2024-07-10

## 2024-08-21 NOTE — PROGRESS NOTES
Office Visit Note  24     Nisreen Parrish 89 y.o. female MRN: [id number]  : 1927    Assessment:     1. Primary hypertension  Assessment & Plan:  Blood pressure today slightly high at 154/64 on amlodipine 5 mg daily and HydroDIURIL 12.5 mg daily along with nebivolol 5 mg daily will continue with the same dose.  Next visit if the blood pressure is still high will consider adjusting the dosage  2. Stage 3a chronic kidney disease (HCC)  Assessment & Plan:  Lab Results   Component Value Date    EGFR 46 2024    EGFR 45 2023    EGFR 74 2023    CREATININE 1.02 2024    CREATININE 1.04 2023    CREATININE 0.67 2023   Patient with stage IIIa chronic kidney disease last creatinine 1.02 with a GFR of 46 we will continue to monitor with periodic labs recommend very strongly to avoid nonsteroidals  3. Other neutropenia (HCC)  Assessment & Plan:  Last visit her WBC count on the lab work was 4.14 on the low side repeat 1 was ordered it had shown a WBC count of 4.76 normal however his hemoglobin keeps fluctuating the last 1 is at 11.2 will continue to monitor closely.  4. Acquired hypothyroidism  Assessment & Plan:  Patient is on levothyroxine 75 mcg daily we will continue with the same last TSH level was normal  5. Age-related osteoporosis without current pathological fracture  Assessment & Plan:  Patient with osteoporosis does not do any further examinations or testing done for the same currently she is taking calcium tablets along with vitamin D does not want to go on any medication also.  6. Dyslipidemia  Assessment & Plan:  Patient is on simvastatin 10 mg at bedtime we will continue with the same last lipid profile done in the month of May showed cholesterol of 148 triglycerides 129 HDL 51 LDL of 71 we will continue with the same dose  7. Gastroesophageal reflux disease without esophagitis  Assessment & Plan:  Currently on Protonix 40 mg daily.  When she misses the dose she gets  increasing heartburn we will continue with the same dose for now.  8. Primary osteoarthritis involving multiple joints  Assessment & Plan:  Recommend Tylenol as needed avoid nonsteroidals because of the chronic renal failure discussed with patient             Discussion Summary and Plan:  Today's care plan and medications were reviewed with patient in detail and all their questions answered to their satisfaction.    Chief Complaint   Patient presents with    Follow-up      Subjective:  Patient is coming here for a follow-up evaluation with regards to symptoms of hypothyroidism also with hypertension and chronic renal failure, osteoarthritis.  Last time blood work had shown mild lowering of the WBC count repeat 1 came back normal.  Patient denies symptoms of chest pain palpitation shortness of breath.  Medications reviewed labs reviewed        The following portions of the patient's history were reviewed and updated as appropriate: allergies, current medications, past family history, past medical history, past social history, past surgical history and problem list.    Review of Systems   Constitutional:  Negative for chills and fever.   HENT:  Negative for ear pain and sore throat.    Eyes:  Negative for pain and visual disturbance.   Respiratory:  Negative for cough and shortness of breath.    Cardiovascular:  Negative for chest pain and palpitations.   Gastrointestinal:  Negative for abdominal pain and vomiting.   Genitourinary:  Negative for dysuria and hematuria.   Musculoskeletal:  Positive for arthralgias. Negative for back pain.   Skin:  Negative for color change and rash.   Neurological:  Negative for seizures and syncope.   All other systems reviewed and are negative.        Historical Information   Patient Active Problem List   Diagnosis    Primary hypertension    Dyslipidemia    Acquired hypothyroidism    Gastroesophageal reflux disease without esophagitis    Osteoarthritis of multiple joints     Age-related osteoporosis without current pathological fracture    Stage 3a chronic kidney disease (HCC)    Upper respiratory tract infection    Other neutropenia (HCC)     Past Medical History:   Diagnosis Date    Acid reflux     Arthritis     DDD (degenerative disc disease), lumbar     Disease of thyroid gland     hypo    DJD (degenerative joint disease)     History of transfusion     many years ago-1940s (after fetal demise-very early pregnancy)    Hyperlipidemia     Hypertension     Osteoporosis     Restless leg      Past Surgical History:   Procedure Laterality Date    APPENDECTOMY      BREAST SURGERY Left     biopsy    CATARACT EXTRACTION W/ INTRAOCULAR LENS IMPLANT Left 10/24/2016    Procedure: EXTRACTION EXTRACAPSULAR CATARACT PHACO INTRAOCULAR LENS (IOL);  Surgeon: Aaron Meza MD;  Location: [facility] MAIN OR;  Service:     DILATION AND CURETTAGE OF UTERUS          DXA PROCEDURE (HISTORICAL)  2020    HEMORRHOID SURGERY  2009    HYSTERECTOMY  1950    with right oophorectomy    TN XCAPSL CTRC RMVL INSJ IO LENS PROSTH W/O ECP Right 2016    Procedure: EXTRACTION EXTRACAPSULAR CATARACT PHACO INTRAOCULAR LENS (IOL);  Surgeon: Aaron Meza MD;  Location: [facility] MAIN OR;  Service: Ophthalmology    SKIN BIOPSY      exc of the back-    TOE SURGERY Right     great toe removal of bone, and between the toes cyst removal    TONSILLECTOMY  89    TUBAL LIGATION       Social History     Substance and Sexual Activity   Alcohol Use Never     Social History     Substance and Sexual Activity   Drug Use Never     Social History     Tobacco Use   Smoking Status Former    Current packs/day: 0.00    Types: Cigarettes    Quit date:     Years since quittin.6    Passive exposure: Past   Smokeless Tobacco Never   Tobacco Comments    social     Family History   Problem Relation Age of Onset    Heart disease Father 54        MI     Health Maintenance Due   Topic    Zoster Vaccine (1 of 2)    RSV  "Vaccine Age 60+ Years (1 - 1-dose 60+ series)    Pneumococcal Vaccine: 65+ Years (1 of 1 - PCV)    COVID-19 Vaccine (4 - 2023-24 season)    Influenza Vaccine (1)    Fall Risk     Depression Screening     Medicare Annual Wellness Visit (AWV)       Meds/Allergies       Current Outpatient Medications:     amLODIPine (NORVASC) 5 mg tablet, Take 1 tablet (5 mg total) by mouth in the morning, Disp: 90 tablet, Rfl: 1    baclofen 10 mg tablet, Take 0.5 tablets (5 mg total) by mouth 3 (three) times a day as needed for muscle spasms, Disp: 20 tablet, Rfl: 0    hydroCHLOROthiazide 12.5 mg tablet, Take 1 tablet (12.5 mg total) by mouth every morning, Disp: 90 tablet, Rfl: 1    levothyroxine 75 mcg tablet, Take 1 tablet (75 mcg total) by mouth daily in the early morning Start taking levothyroxine 75 mcg daily, Disp: 90 tablet, Rfl: 1    lidocaine (Lidoderm) 5 %, Apply 1 patch topically over 12 hours daily Remove & Discard patch within 12 hours or as directed by MD, Disp: 15 patch, Rfl: 0    nebivolol (BYSTOLIC) 5 mg tablet, Take 1 tablet (5 mg total) by mouth every morning, Disp: 90 tablet, Rfl: 1    Omega-3 Fatty Acids (FISH OIL) 1200 MG CAPS, Take by mouth 2 (two) times a day, Disp: , Rfl:     pantoprazole (PROTONIX) 40 mg tablet, Take 1 tablet (40 mg total) by mouth every morning, Disp: 90 tablet, Rfl: 1    potassium chloride (Klor-Con M10) 10 mEq tablet, Take 10 mEq by mouth daily, Disp: , Rfl:     potassium chloride (Klor-Con) 10 mEq tablet, Take 1 tablet (10 mEq total) by mouth every morning, Disp: 90 tablet, Rfl: 1    simvastatin (ZOCOR) 10 mg tablet, Take 1 tablet (10 mg total) by mouth daily at bedtime, Disp: 90 tablet, Rfl: 3      Objective:    Vitals:   /64 Comment: having occasional headaches  Pulse 58   Temp 97.9 °F (36.6 °C)   Resp 15   Ht 4' 9\" (1.448 m)   Wt 58.5 kg (129 lb)   SpO2 99%   BMI 27.92 kg/m²   Body mass index is 27.92 kg/m².  Vitals:    08/21/24 1043   Weight: 58.5 kg (129 lb) "       Physical Exam  Vitals and nursing note reviewed.   Constitutional:       Appearance: Normal appearance.   HENT:      Head: Normocephalic and atraumatic.   Cardiovascular:      Rate and Rhythm: Normal rate and regular rhythm.      Heart sounds: Normal heart sounds.   Pulmonary:      Effort: Pulmonary effort is normal.      Breath sounds: Normal breath sounds.   Abdominal:      Palpations: Abdomen is soft.   Musculoskeletal:      Cervical back: Normal range of motion and neck supple.      Right lower leg: No edema.      Left lower leg: No edema.   Skin:     General: Skin is warm and dry.   Neurological:      Mental Status: She is alert and oriented to person, place, and time.   Psychiatric:         Mood and Affect: Mood normal.         Behavior: Behavior normal.         Lab Review   Appointment on 08/13/2024   Component Date Value Ref Range Status    WBC 08/13/2024 4.76  4.31 - 10.16 Thousand/uL Final    RBC 08/13/2024 3.53 (L)  3.81 - 5.12 Million/uL Final    Hemoglobin 08/13/2024 11.2 (L)  11.5 - 15.4 g/dL Final    Hematocrit 08/13/2024 34.7 (L)  34.8 - 46.1 % Final    MCV 08/13/2024 98  82 - 98 fL Final    MCH 08/13/2024 31.7  26.8 - 34.3 pg Final    MCHC 08/13/2024 32.3  31.4 - 37.4 g/dL Final    RDW 08/13/2024 14.6  11.6 - 15.1 % Final    MPV 08/13/2024 13.6 (H)  8.9 - 12.7 fL Final    Platelets 08/13/2024 157  149 - 390 Thousands/uL Final    nRBC 08/13/2024 0  /100 WBCs Final    Segmented % 08/13/2024 53  43 - 75 % Final    Immature Grans % 08/13/2024 1  0 - 2 % Final    Lymphocytes % 08/13/2024 36  14 - 44 % Final    Monocytes % 08/13/2024 8  4 - 12 % Final    Eosinophils Relative 08/13/2024 2  0 - 6 % Final    Basophils Relative 08/13/2024 0  0 - 1 % Final    Absolute Neutrophils 08/13/2024 2.52  1.85 - 7.62 Thousands/µL Final    Absolute Immature Grans 08/13/2024 0.03  0.00 - 0.20 Thousand/uL Final    Absolute Lymphocytes 08/13/2024 1.71  0.60 - 4.47 Thousands/µL Final    Absolute Monocytes 08/13/2024  "0.37  0.17 - 1.22 Thousand/µL Final    Eosinophils Absolute 08/13/2024 0.11  0.00 - 0.61 Thousand/µL Final    Basophils Absolute 08/13/2024 0.02  0.00 - 0.10 Thousands/µL Final         Geovanna Bruno MD        \"This note has been constructed using a voice recognition system.Therefore there may be syntax, spelling, and/or grammatical errors. Please call if you have any questions. \"  "

## 2024-08-21 NOTE — ASSESSMENT & PLAN NOTE
Blood pressure today slightly high at 154/64 on amlodipine 5 mg daily and HydroDIURIL 12.5 mg daily along with nebivolol 5 mg daily will continue with the same dose.  Next visit if the blood pressure is still high will consider adjusting the dosage  
Currently on Protonix 40 mg daily.  When she misses the dose she gets increasing heartburn we will continue with the same dose for now.  
Lab Results   Component Value Date    EGFR 46 05/16/2024    EGFR 45 07/17/2023    EGFR 74 01/17/2023    CREATININE 1.02 05/16/2024    CREATININE 1.04 07/17/2023    CREATININE 0.67 01/17/2023   Patient with stage IIIa chronic kidney disease last creatinine 1.02 with a GFR of 46 we will continue to monitor with periodic labs recommend very strongly to avoid nonsteroidals  
Last visit her WBC count on the lab work was 4.14 on the low side repeat 1 was ordered it had shown a WBC count of 4.76 normal however his hemoglobin keeps fluctuating the last 1 is at 11.2 will continue to monitor closely.  
Patient is on levothyroxine 75 mcg daily we will continue with the same last TSH level was normal  
Patient is on simvastatin 10 mg at bedtime we will continue with the same last lipid profile done in the month of May showed cholesterol of 148 triglycerides 129 HDL 51 LDL of 71 we will continue with the same dose  
Patient with osteoporosis does not do any further examinations or testing done for the same currently she is taking calcium tablets along with vitamin D does not want to go on any medication also.  
Recommend Tylenol as needed avoid nonsteroidals because of the chronic renal failure discussed with patient  
No

## 2024-09-09 NOTE — ASSESSMENT & PLAN NOTE
Blood pressure 160/68 with repeat 1 came back at 140/70. Currently patient is taking Bystolic 5 mg daily and amlodipine 5 mg daily along with HCTZ 12.5 we will continue.
Continue Synthroid 75 mcg daily we will follow-up with repeat lab later.
Lab Results   Component Value Date    EGFR 45 07/17/2023    EGFR 74 01/17/2023    EGFR 74 01/16/2023    CREATININE 1.04 07/17/2023    CREATININE 0.67 01/17/2023    CREATININE 0.69 01/16/2023   Patient's GFR is 45 creatinine 1.04 we will try to avoid nonsteroidals at this time.   Follow-up with repeat CMP later
Patient at present time not on any medication she was on Prolia before recommend patient take calcium and vitamin D
Patient is on Protonix 40 mg daily we will continue with the same she tried to discontinue but could not she does have GERD symptoms we will continue the same for now.
Patient is using walker to ambulate takes Tylenol as needed for the joint pains recommend to avoid anti-inflammatories especially with the low back pain she had recently
Patient was on simvastatin 10 mg at bedtime however it was discontinued when she was in the nursing home last cholesterol level is 208 and recommended a repeat cholesterol level but not done yet discussed with patient regarding diet and to go for blood work.
Patient with right-sided low back pain increases with movements getting better slowly was seen in the emergency room and was prescribed Motrin Tylenol and Lidoderm patches which appears to have helped.   Will recommend patient to hold off the Motrin for now we will renew the Lidoderm patches we will also give her some muscle relaxer baclofen 5 mg twice daily as needed
No indicators present

## 2024-11-21 ENCOUNTER — OFFICE VISIT (OUTPATIENT)
Dept: FAMILY MEDICINE CLINIC | Facility: CLINIC | Age: 89
End: 2024-11-21
Payer: MEDICARE

## 2024-11-21 VITALS
SYSTOLIC BLOOD PRESSURE: 124 MMHG | BODY MASS INDEX: 27.7 KG/M2 | HEIGHT: 57 IN | HEART RATE: 66 BPM | OXYGEN SATURATION: 95 % | WEIGHT: 128.4 LBS | DIASTOLIC BLOOD PRESSURE: 62 MMHG

## 2024-11-21 DIAGNOSIS — N18.31 STAGE 3A CHRONIC KIDNEY DISEASE (HCC): ICD-10-CM

## 2024-11-21 DIAGNOSIS — E03.9 ACQUIRED HYPOTHYROIDISM: ICD-10-CM

## 2024-11-21 DIAGNOSIS — I10 PRIMARY HYPERTENSION: ICD-10-CM

## 2024-11-21 DIAGNOSIS — Z00.00 MEDICARE ANNUAL WELLNESS VISIT, SUBSEQUENT: ICD-10-CM

## 2024-11-21 DIAGNOSIS — M15.0 PRIMARY OSTEOARTHRITIS INVOLVING MULTIPLE JOINTS: ICD-10-CM

## 2024-11-21 DIAGNOSIS — M81.0 AGE-RELATED OSTEOPOROSIS WITHOUT CURRENT PATHOLOGICAL FRACTURE: ICD-10-CM

## 2024-11-21 DIAGNOSIS — Z23 ENCOUNTER FOR IMMUNIZATION: ICD-10-CM

## 2024-11-21 DIAGNOSIS — K21.9 GASTROESOPHAGEAL REFLUX DISEASE WITHOUT ESOPHAGITIS: ICD-10-CM

## 2024-11-21 DIAGNOSIS — E78.5 DYSLIPIDEMIA: ICD-10-CM

## 2024-11-21 DIAGNOSIS — R42 DIZZINESS: Primary | ICD-10-CM

## 2024-11-21 DIAGNOSIS — R73.03 PREDIABETES: ICD-10-CM

## 2024-11-21 DIAGNOSIS — Z13.0 SCREENING FOR DEFICIENCY ANEMIA: ICD-10-CM

## 2024-11-21 PROCEDURE — 90662 IIV NO PRSV INCREASED AG IM: CPT

## 2024-11-21 PROCEDURE — G0008 ADMIN INFLUENZA VIRUS VAC: HCPCS

## 2024-11-21 PROCEDURE — G0439 PPPS, SUBSEQ VISIT: HCPCS | Performed by: INTERNAL MEDICINE

## 2024-11-21 PROCEDURE — 99214 OFFICE O/P EST MOD 30 MIN: CPT | Performed by: INTERNAL MEDICINE

## 2024-11-21 RX ORDER — MECLIZINE HCL 12.5 MG 12.5 MG/1
12.5 TABLET ORAL 3 TIMES DAILY PRN
Start: 2024-11-21

## 2024-11-21 NOTE — ASSESSMENT & PLAN NOTE
Blood pressure is stable 124/60 2 repeat 1 134/68 no orthostatic blood pressure is good.  Check because patient was not able to lay flat currently on amlodipine 5 mg daily HydroDIURIL 12.5 and nebivolol 5 mg continue

## 2024-11-21 NOTE — PROGRESS NOTES
Name: Nisreen Parrish      : 1927      MRN: [id number]  Encounter Provider: Geovanna Bruno MD  Encounter Date: 2024   Encounter department: [facility] [place]    Assessment & Plan  Dizziness  Patient has been feeling lightheadedness on and off especially in the morning as mentioned in the history of present illness subsequently starts to clear up but again she might get on and off.  I was not able to do an orthostatic blood pressure readings on her today's blood pressure is 124/60 2 repeat 1 around 134/68.  Currently patient is taking amlodipine 5 mg daily and Bystolic 5 mg daily along with hydrochlorothiazide 12.5 we will continue with the same there is a mild question nystagmus noted we will try her on meclizine 12.5 mg side effects explained to the patient and follow-up.    Orders:    meclizine (ANTIVERT) 12.5 MG tablet; Take 1 tablet (12.5 mg total) by mouth 3 (three) times a day as needed for dizziness    Acquired hypothyroidism  Currently patient is taking levothyroxine 75 mcg daily last TSH level was 0.998 in the month of May we will continue with the same dose follow-up with repeat TSH level    Orders:    TSH, 3rd generation with Free T4 reflex; Future    Age-related osteoporosis without current pathological fracture  Patient at present time does not want to go on any medication not able to take the vitamin D or calcium tablets also recommend patient to get another DEXA scan done she agreed for the same.    Orders:    DXA bone density spine hip and pelvis; Future    Dyslipidemia  Continue simvastatin 20 mg at bedtime follow-up with repeat lipid profile    Orders:    Lipid panel; Future    Gastroesophageal reflux disease without esophagitis  Patient is taking 40 mg of Protonix daily for GERD symptoms         Primary osteoarthritis involving multiple joints  Tylenol as needed         Primary hypertension  Blood pressure is stable 124/60 2 repeat 1 134/68 no orthostatic blood  pressure is good.  Check because patient was not able to lay flat currently on amlodipine 5 mg daily HydroDIURIL 12.5 and nebivolol 5 mg continue         Stage 3a chronic kidney disease (HCC)  Lab Results   Component Value Date    EGFR 46 05/16/2024    EGFR 45 07/17/2023    EGFR 74 01/17/2023    CREATININE 1.02 05/16/2024    CREATININE 1.04 07/17/2023    CREATININE 0.67 01/17/2023   Patient's GFR is 46 creatinine 1.02 will follow-up with repeat lab         Screening for deficiency anemia    Orders:    CBC and differential; Future    Prediabetes    Orders:    Comprehensive metabolic panel; Future    Urinalysis with microscopic; Future    Encounter for immunization    Orders:    influenza vaccine, high-dose, PF 0.5 mL (Fluzone High Dose)    Medicare annual wellness visit, subsequent           Depression Screening and Follow-up Plan: Patient was screened for depression during today's encounter. They screened negative with a PHQ-2 score of 0.      Preventive health issues were discussed with patient, and age appropriate screening tests were ordered as noted in patient's After Visit Summary. Personalized health advice and appropriate referrals for health education or preventive services given if needed, as noted in patient's After Visit Summary.    History of Present Illness     Patient is coming here for a follow-up evaluation with regards to her symptoms of hypertension, DJD, hypothyroidism, chronic kidney disease.  Patient complains of dizziness on and off sometimes in the morning when she wakes up she feels unsteady at times.  Then she starts to feel better.  During the daytime on and off she gets it.  I tried to get an orthostatic blood pressure readings on her but not able to since patient is not able to get on the table and lay flat.    Medication reviewed labs reviewed.       Patient Care Team:  Geovanna Bruno MD as PCP - General (Internal Medicine)    Review of Systems   Constitutional:  Negative for chills  and fever.   HENT:  Negative for ear pain and sore throat.    Eyes:  Negative for pain and visual disturbance.   Respiratory:  Negative for cough and shortness of breath.    Cardiovascular:  Negative for chest pain and palpitations.   Gastrointestinal:  Negative for abdominal pain and vomiting.   Genitourinary:  Negative for dysuria and hematuria.   Musculoskeletal:  Positive for arthralgias and back pain.   Skin:  Negative for color change and rash.   Neurological:  Negative for seizures and syncope.   All other systems reviewed and are negative.    Medical History Reviewed by provider this encounter:       Annual Wellness Visit Questionnaire   Nisreen is here for her Subsequent Wellness visit. Last Medicare Wellness visit information reviewed, patient interviewed, no change since last AWV. Last Medicare Wellness visit information reviewed, patient interviewed and updates made to the record today.      Health Risk Assessment:   Patient rates overall health as good. Patient feels that their physical health rating is same. Patient is very satisfied with their life. Eyesight was rated as same. Hearing was rated as same. Patient feels that their emotional and mental health rating is same. Patients states they are sometimes angry. Patient states they are sometimes unusually tired/fatigued. Pain experienced in the last 7 days has been some. Patient's pain rating has been 5/10. Patient states that she has experienced no weight loss or gain in last 6 months. Satisfied with life    Depression Screening:   PHQ-2 Score: 0  PHQ-9 Score: 0      Fall Risk Screening:   In the past year, patient has experienced: no history of falling in past year      Urinary Incontinence Screening:   Patient has not leaked urine accidently in the last six months.     Home Safety:  Patient does not have trouble with stairs inside or outside of their home. Patient has working smoke alarms and has working carbon monoxide detector. Home safety  hazards include: none. Home is very safe    Nutrition:   Current diet is Regular. Cooks for herself    Medications:   Patient is currently taking over-the-counter supplements. OTC medications include: see medication list. Patient is able to manage medications.     Activities of Daily Living (ADLs)/Instrumental Activities of Daily Living (IADLs):   Walk and transfer into and out of bed and chair?: Yes  Dress and groom yourself?: Yes    Bathe or shower yourself?: No    Feed yourself? Yes  Do your laundry/housekeeping?: No  Manage your money, pay your bills and track your expenses?: No  Make your own meals?: No    Do your own shopping?: No    ADL comments: Her son and his wife take care of her and assist her with any needs     Previous Hospitalizations:   Any hospitalizations or ED visits within the last 12 months?: No    How many hospitalizations have you had in the last year?: 1-2    Advance Care Planning:   Living will: Yes    Durable POA for healthcare: Yes    Advanced directive: Yes    End of Life Decisions reviewed with patient: Yes    Provider agrees with end of life decisions: Yes      Comments: ACP documents on file    PREVENTIVE SCREENINGS      Cardiovascular Screening:    General: Screening Current      Diabetes Screening:     General: Screening Current      Colorectal Cancer Screening:     General: Screening Not Indicated      Breast Cancer Screening:     General: Screening Not Indicated      Cervical Cancer Screening:    General: Screening Not Indicated      Osteoporosis Screening:    General: Screening Not Indicated and History Osteoporosis    Due for: DXA Axial      Abdominal Aortic Aneurysm (AAA) Screening:        General: Screening Not Indicated      Lung Cancer Screening:     General: Screening Not Indicated      Hepatitis C Screening:    General: Screening Not Indicated    Screening, Brief Intervention, and Referral to Treatment (SBIRT)    Screening  Typical number of drinks in a day: 0  Typical  "number of drinks in a week: 0  Interpretation: Low risk drinking behavior.    AUDIT-C Screenin) How often did you have a drink containing alcohol in the past year? never  2) How many drinks did you have on a typical day when you were drinking in the past year? 0  3) How often did you have 6 or more drinks on one occasion in the past year? never    AUDIT-C Score: 0  Interpretation: Score 0-2 (female): Negative screen for alcohol misuse    Single Item Drug Screening:  How often have you used an illegal drug (including marijuana) or a prescription medication for non-medical reasons in the past year? never    Single Item Drug Screen Score: 0  Interpretation: Negative screen for possible drug use disorder    Social Drivers of Health     Financial Resource Strain: Low Risk  (11/10/2023)    Overall Financial Resource Strain (CARDIA)     Difficulty of Paying Living Expenses: Not hard at all   Food Insecurity: No Food Insecurity (2024)    Hunger Vital Sign     Worried About Running Out of Food in the Last Year: Never true     Ran Out of Food in the Last Year: Never true   Transportation Needs: No Transportation Needs (2024)    PRAPARE - Transportation     Lack of Transportation (Medical): No     Lack of Transportation (Non-Medical): No   Housing Stability: Low Risk  (2024)    Housing Stability Vital Sign     Unable to Pay for Housing in the Last Year: No     Number of Times Moved in the Last Year: 1     Homeless in the Last Year: No   Utilities: Not At Risk (2024)    [facility] Utilities     Threatened with loss of utilities: No     No results found.    Objective   /62 (BP Location: Right arm, Patient Position: Sitting, Cuff Size: Standard)   Pulse 66   Ht 4' 9\" (1.448 m)   Wt 58.2 kg (128 lb 6.4 oz)   SpO2 95%   BMI 27.79 kg/m²     Physical Exam    "

## 2024-11-21 NOTE — ASSESSMENT & PLAN NOTE
Lab Results   Component Value Date    EGFR 46 05/16/2024    EGFR 45 07/17/2023    EGFR 74 01/17/2023    CREATININE 1.02 05/16/2024    CREATININE 1.04 07/17/2023    CREATININE 0.67 01/17/2023   Patient's GFR is 46 creatinine 1.02 will follow-up with repeat lab

## 2024-11-21 NOTE — PROGRESS NOTES
Office Visit Note  24     Nisreen Parrish 89 y.o. female MRN: [id number]  : 1927    Assessment:     {There are no diagnoses linked to this encounter. (Refresh or delete this SmartLink)}           Discussion Summary and Plan:  Today's care plan and medications were reviewed with patient in detail and all their questions answered to their satisfaction.    No chief complaint on file.     Subjective:  HPI    The following portions of the patient's history were reviewed and updated as appropriate: allergies, current medications, past family history, past medical history, past social history, past surgical history and problem list.    Review of Systems   Constitutional:  Negative for chills and fever.   HENT:  Negative for ear pain and sore throat.    Eyes:  Negative for pain and visual disturbance.   Respiratory:  Negative for cough and shortness of breath.    Cardiovascular:  Negative for chest pain and palpitations.   Gastrointestinal:  Negative for abdominal pain and vomiting.   Genitourinary:  Negative for dysuria and hematuria.   Musculoskeletal:  Negative for arthralgias and back pain.   Skin:  Negative for color change and rash.   Neurological:  Negative for seizures and syncope.   All other systems reviewed and are negative.      Historical Information   Patient Active Problem List   Diagnosis   • Primary hypertension   • Dyslipidemia   • Acquired hypothyroidism   • Gastroesophageal reflux disease without esophagitis   • Osteoarthritis of multiple joints   • Age-related osteoporosis without current pathological fracture   • Stage 3a chronic kidney disease (HCC)   • Upper respiratory tract infection   • Other neutropenia (HCC)     Past Medical History:   Diagnosis Date   • Acid reflux    • Arthritis    • DDD (degenerative disc disease), lumbar    • Disease of thyroid gland     hypo   • DJD (degenerative joint disease)    • History of transfusion     many years ago-1940's (after fetal demise-very  early pregnancy)   • Hyperlipidemia    • Hypertension    • Osteoporosis    • Restless leg      Past Surgical History:   Procedure Laterality Date   • APPENDECTOMY     • BREAST SURGERY Left     biopsy   • CATARACT EXTRACTION W/ INTRAOCULAR LENS IMPLANT Left 10/24/2016    Procedure: EXTRACTION EXTRACAPSULAR CATARACT PHACO INTRAOCULAR LENS (IOL);  Surgeon: Aaron Meza MD;  Location: [facility] MAIN OR;  Service:    • DILATION AND CURETTAGE OF UTERUS         • DXA PROCEDURE (HISTORICAL)  2020   • HEMORRHOID SURGERY     • HYSTERECTOMY  1950    with right oophorectomy   • NJ XCAPSL CTRC RMVL INSJ IO LENS PROSTH W/O ECP Right 2016    Procedure: EXTRACTION EXTRACAPSULAR CATARACT PHACO INTRAOCULAR LENS (IOL);  Surgeon: Aaron Meza MD;  Location: [facility] MAIN OR;  Service: Ophthalmology   • SKIN BIOPSY      exc of the back-   • TOE SURGERY Right     great toe removal of bone, and between the toes cyst removal   • TONSILLECTOMY     • TUBAL LIGATION       Social History     Substance and Sexual Activity   Alcohol Use Never     Social History     Substance and Sexual Activity   Drug Use Never     Social History     Tobacco Use   Smoking Status Former   • Current packs/day: 0.00   • Types: Cigarettes   • Quit date:    • Years since quittin.9   • Passive exposure: Past   Smokeless Tobacco Never   Tobacco Comments    social     Family History   Problem Relation Age of Onset   • Heart disease Father 54        MI     Health Maintenance Due   Topic   • Zoster Vaccine (1 of 2)   • Pneumococcal Vaccine: 65+ Years (1 of 1 - PCV)   • RSV Vaccine Age 60+ Years (1 - 1-dose 75+ series)   • Influenza Vaccine (1)   • COVID-19 Vaccine ( season)   • Fall Risk    • Depression Screening    • Urinary Incontinence Screening    • Medicare Annual Wellness Visit (AWV)       Meds/Allergies       Current Outpatient Medications:   •  amLODIPine (NORVASC) 5 mg tablet, Take 1 tablet (5 mg total) by  mouth in the morning, Disp: 90 tablet, Rfl: 1  •  baclofen 10 mg tablet, Take 0.5 tablets (5 mg total) by mouth 3 (three) times a day as needed for muscle spasms, Disp: 20 tablet, Rfl: 0  •  hydroCHLOROthiazide 12.5 mg tablet, Take 1 tablet (12.5 mg total) by mouth every morning, Disp: 90 tablet, Rfl: 1  •  levothyroxine 75 mcg tablet, Take 1 tablet (75 mcg total) by mouth daily in the early morning Start taking levothyroxine 75 mcg daily, Disp: 90 tablet, Rfl: 1  •  lidocaine (Lidoderm) 5 %, Apply 1 patch topically over 12 hours daily Remove & Discard patch within 12 hours or as directed by MD, Disp: 15 patch, Rfl: 0  •  nebivolol (BYSTOLIC) 5 mg tablet, Take 1 tablet (5 mg total) by mouth every morning, Disp: 90 tablet, Rfl: 1  •  Omega-3 Fatty Acids (FISH OIL) 1200 MG CAPS, Take by mouth 2 (two) times a day, Disp: , Rfl:   •  pantoprazole (PROTONIX) 40 mg tablet, Take 1 tablet (40 mg total) by mouth every morning, Disp: 90 tablet, Rfl: 1  •  potassium chloride (Klor-Con M10) 10 mEq tablet, Take 10 mEq by mouth daily, Disp: , Rfl:   •  potassium chloride (Klor-Con) 10 mEq tablet, Take 1 tablet (10 mEq total) by mouth every morning, Disp: 90 tablet, Rfl: 1  •  simvastatin (ZOCOR) 10 mg tablet, Take 1 tablet (10 mg total) by mouth daily at bedtime, Disp: 90 tablet, Rfl: 3      Objective:    Vitals:   There were no vitals taken for this visit.  There is no height or weight on file to calculate BMI.  There were no vitals filed for this visit.    Physical Exam    Lab Review   No visits with results within 2 Month(s) from this visit.   Latest known visit with results is:   Appointment on 08/13/2024   Component Date Value Ref Range Status   • WBC 08/13/2024 4.76  4.31 - 10.16 Thousand/uL Final   • RBC 08/13/2024 3.53 (L)  3.81 - 5.12 Million/uL Final   • Hemoglobin 08/13/2024 11.2 (L)  11.5 - 15.4 g/dL Final   • Hematocrit 08/13/2024 34.7 (L)  34.8 - 46.1 % Final   • MCV 08/13/2024 98  82 - 98 fL Final   • MCH 08/13/2024  "31.7  26.8 - 34.3 pg Final   • MCHC 08/13/2024 32.3  31.4 - 37.4 g/dL Final   • RDW 08/13/2024 14.6  11.6 - 15.1 % Final   • MPV 08/13/2024 13.6 (H)  8.9 - 12.7 fL Final   • Platelets 08/13/2024 157  149 - 390 Thousands/uL Final   • nRBC 08/13/2024 0  /100 WBCs Final   • Segmented % 08/13/2024 53  43 - 75 % Final   • Immature Grans % 08/13/2024 1  0 - 2 % Final   • Lymphocytes % 08/13/2024 36  14 - 44 % Final   • Monocytes % 08/13/2024 8  4 - 12 % Final   • Eosinophils Relative 08/13/2024 2  0 - 6 % Final   • Basophils Relative 08/13/2024 0  0 - 1 % Final   • Absolute Neutrophils 08/13/2024 2.52  1.85 - 7.62 Thousands/µL Final   • Absolute Immature Grans 08/13/2024 0.03  0.00 - 0.20 Thousand/uL Final   • Absolute Lymphocytes 08/13/2024 1.71  0.60 - 4.47 Thousands/µL Final   • Absolute Monocytes 08/13/2024 0.37  0.17 - 1.22 Thousand/µL Final   • Eosinophils Absolute 08/13/2024 0.11  0.00 - 0.61 Thousand/µL Final   • Basophils Absolute 08/13/2024 0.02  0.00 - 0.10 Thousands/µL Final         Melody Joel MA        \"This note has been constructed using a voice recognition system.Therefore there may be syntax, spelling, and/or grammatical errors. Please call if you have any questions. \"  "

## 2024-11-21 NOTE — ASSESSMENT & PLAN NOTE
Continue simvastatin 20 mg at bedtime follow-up with repeat lipid profile    Orders:    Lipid panel; Future

## 2024-11-21 NOTE — ASSESSMENT & PLAN NOTE
Patient has been feeling lightheadedness on and off especially in the morning as mentioned in the history of present illness subsequently starts to clear up but again she might get on and off.  I was not able to do an orthostatic blood pressure readings on her today's blood pressure is 124/60 2 repeat 1 around 134/68.  Currently patient is taking amlodipine 5 mg daily and Bystolic 5 mg daily along with hydrochlorothiazide 12.5 we will continue with the same there is a mild question nystagmus noted we will try her on meclizine 12.5 mg side effects explained to the patient and follow-up.    Orders:    meclizine (ANTIVERT) 12.5 MG tablet; Take 1 tablet (12.5 mg total) by mouth 3 (three) times a day as needed for dizziness

## 2024-11-21 NOTE — ASSESSMENT & PLAN NOTE
Patient at present time does not want to go on any medication not able to take the vitamin D or calcium tablets also recommend patient to get another DEXA scan done she agreed for the same.    Orders:    DXA bone density spine hip and pelvis; Future

## 2024-11-21 NOTE — ASSESSMENT & PLAN NOTE
Currently patient is taking levothyroxine 75 mcg daily last TSH level was 0.998 in the month of May we will continue with the same dose follow-up with repeat TSH level    Orders:    TSH, 3rd generation with Free T4 reflex; Future

## 2024-11-21 NOTE — PATIENT INSTRUCTIONS
Medicare Preventive Visit Patient Instructions  Thank you for completing your Welcome to Medicare Visit or Medicare Annual Wellness Visit today. Your next wellness visit will be due in one year (11/22/2025).  The screening/preventive services that you may require over the next 5-10 years are detailed below. Some tests may not apply to you based off risk factors and/or age. Screening tests ordered at today's visit but not completed yet may show as past due. Also, please note that scanned in results may not display below.  Preventive Screenings:  Service Recommendations Previous Testing/Comments   Colorectal Cancer Screening  * Colonoscopy    * Fecal Occult Blood Test (FOBT)/Fecal Immunochemical Test (FIT)  * Fecal DNA/Cologuard Test  * Flexible Sigmoidoscopy Age: 45-75 years old   Colonoscopy: every 10 years (may be performed more frequently if at higher risk)  OR  FOBT/FIT: every 1 year  OR  Cologuard: every 3 years  OR  Sigmoidoscopy: every 5 years  Screening may be recommended earlier than age 45 if at higher risk for colorectal cancer. Also, an individualized decision between you and your healthcare provider will decide whether screening between the ages of 76-85 would be appropriate. Colonoscopy: Not on file  FOBT/FIT: Not on file  Cologuard: Not on file  Sigmoidoscopy: Not on file          Breast Cancer Screening Age: 40+ years old  Frequency: every 1-2 years  Not required if history of left and right mastectomy Mammogram: Not on file        Cervical Cancer Screening Between the ages of 21-29, pap smear recommended once every 3 years.   Between the ages of 30-65, can perform pap smear with HPV co-testing every 5 years.   Recommendations may differ for women with a history of total hysterectomy, cervical cancer, or abnormal pap smears in past. Pap Smear: Not on file        Hepatitis C Screening Once for adults born between 1945 and 1965  More frequently in patients at high risk for Hepatitis C Hep C Antibody:  Not on file        Diabetes Screening 1-2 times per year if you're at risk for diabetes or have pre-diabetes Fasting glucose: 102 mg/dL (5/16/2024)  A1C: 5.2 % (5/16/2024)      Cholesterol Screening Once every 5 years if you don't have a lipid disorder. May order more often based on risk factors. Lipid panel: 05/16/2024          Other Preventive Screenings Covered by Medicare:  Abdominal Aortic Aneurysm (AAA) Screening: covered once if your at risk. You're considered to be at risk if you have a family history of AAA.  Lung Cancer Screening: covers low dose CT scan once per year if you meet all of the following conditions: (1) Age 55-77; (2) No signs or symptoms of lung cancer; (3) Current smoker or have quit smoking within the last 15 years; (4) You have a tobacco smoking history of at least 20 pack years (packs per day multiplied by number of years you smoked); (5) You get a written order from a healthcare provider.  Glaucoma Screening: covered annually if you're considered high risk: (1) You have diabetes OR (2) Family history of glaucoma OR (3)  aged 50 and older OR (4)  American aged 65 and older  Osteoporosis Screening: covered every 2 years if you meet one of the following conditions: (1) You're estrogen deficient and at risk for osteoporosis based off medical history and other findings; (2) Have a vertebral abnormality; (3) On glucocorticoid therapy for more than 3 months; (4) Have primary hyperparathyroidism; (5) On osteoporosis medications and need to assess response to drug therapy.   Last bone density test (DXA Scan): 08/18/2020.  HIV Screening: covered annually if you're between the age of 15-65. Also covered annually if you are younger than 15 and older than 65 with risk factors for HIV infection. For pregnant patients, it is covered up to 3 times per pregnancy.    Immunizations:  Immunization Recommendations   Influenza Vaccine Annual influenza vaccination during flu season is  recommended for all persons aged >= 6 months who do not have contraindications   Pneumococcal Vaccine   * Pneumococcal conjugate vaccine = PCV13 (Prevnar 13), PCV15 (Vaxneuvance), PCV20 (Prevnar 20)  * Pneumococcal polysaccharide vaccine = PPSV23 (Pneumovax) Adults 19-63 yo with certain risk factors or if 65+ yo  If never received any pneumonia vaccine: recommend Prevnar 20 (PCV20)  Give PCV20 if previously received 1 dose of PCV13 or PPSV23   Hepatitis B Vaccine 3 dose series if at intermediate or high risk (ex: diabetes, end stage renal disease, liver disease)   Respiratory syncytial virus (RSV) Vaccine - COVERED BY MEDICARE PART D  * RSVPreF3 (Arexvy) CDC recommends that adults 60 years of age and older may receive a single dose of RSV vaccine using shared clinical decision-making (SCDM)   Tetanus (Td) Vaccine - COST NOT COVERED BY MEDICARE PART B Following completion of primary series, a booster dose should be given every 10 years to maintain immunity against tetanus. Td may also be given as tetanus wound prophylaxis.   Tdap Vaccine - COST NOT COVERED BY MEDICARE PART B Recommended at least once for all adults. For pregnant patients, recommended with each pregnancy.   Shingles Vaccine (Shingrix) - COST NOT COVERED BY MEDICARE PART B  2 shot series recommended in those 19 years and older who have or will have weakened immune systems or those 50 years and older     Health Maintenance Due:  There are no preventive care reminders to display for this patient.  Immunizations Due:      Topic Date Due   • Pneumococcal Vaccine: 65+ Years (1 of 1 - PCV) Never done   • Influenza Vaccine (1) 09/01/2024   • COVID-19 Vaccine (4 - 2024-25 season) 09/01/2024     Advance Directives   What are advance directives?  Advance directives are legal documents that state your wishes and plans for medical care. These plans are made ahead of time in case you lose your ability to make decisions for yourself. Advance directives can apply to  any medical decision, such as the treatments you want, and if you want to donate organs.   What are the types of advance directives?  There are many types of advance directives, and each state has rules about how to use them. You may choose a combination of any of the following:  Living will:  This is a written record of the treatment you want. You can also choose which treatments you do not want, which to limit, and which to stop at a certain time. This includes surgery, medicine, IV fluid, and tube feedings.   Durable power of  for healthcare (DPAHC):  This is a written record that states who you want to make healthcare choices for you when you are unable to make them for yourself. This person, called a proxy, is usually a family member or a friend. You may choose more than 1 proxy.  Do not resuscitate (DNR) order:  A DNR order is used in case your heart stops beating or you stop breathing. It is a request not to have certain forms of treatment, such as CPR. A DNR order may be included in other types of advance directives.  Medical directive:  This covers the care that you want if you are in a coma, near death, or unable to make decisions for yourself. You can list the treatments you want for each condition. Treatment may include pain medicine, surgery, blood transfusions, dialysis, IV or tube feedings, and a ventilator (breathing machine).  Values history:  This document has questions about your views, beliefs, and how you feel and think about life. This information can help others choose the care that you would choose.  Why are advance directives important?  An advance directive helps you control your care. Although spoken wishes may be used, it is better to have your wishes written down. Spoken wishes can be misunderstood, or not followed. Treatments may be given even if you do not want them. An advance directive may make it easier for your family to make difficult choices about your care.   Weight  Management   Why it is important to manage your weight:  Being overweight increases your risk of health conditions such as heart disease, high blood pressure, type 2 diabetes, and certain types of cancer. It can also increase your risk for osteoarthritis, sleep apnea, and other respiratory problems. Aim for a slow, steady weight loss. Even a small amount of weight loss can lower your risk of health problems.  How to lose weight safely:  A safe and healthy way to lose weight is to eat fewer calories and get regular exercise. You can lose up about 1 pound a week by decreasing the number of calories you eat by 500 calories each day.   Healthy meal plan for weight management:  A healthy meal plan includes a variety of foods, contains fewer calories, and helps you stay healthy. A healthy meal plan includes the following:  Eat whole-grain foods more often.  A healthy meal plan should contain fiber. Fiber is the part of grains, fruits, and vegetables that is not broken down by your body. Whole-grain foods are healthy and provide extra fiber in your diet. Some examples of whole-grain foods are whole-wheat breads and pastas, oatmeal, brown rice, and bulgur.  Eat a variety of vegetables every day.  Include dark, leafy greens such as spinach, kale, julee greens, and mustard greens. Eat yellow and orange vegetables such as carrots, sweet potatoes, and winter squash.   Eat a variety of fruits every day.  Choose fresh or canned fruit (canned in its own juice or light syrup) instead of juice. Fruit juice has very little or no fiber.  Eat low-fat dairy foods.  Drink fat-free (skim) milk or 1% milk. Eat fat-free yogurt and low-fat cottage cheese. Try low-fat cheeses such as mozzarella and other reduced-fat cheeses.  Choose meat and other protein foods that are low in fat.  Choose beans or other legumes such as split peas or lentils. Choose fish, skinless poultry (chicken or turkey), or lean cuts of red meat (beef or pork). Before  you cook meat or poultry, cut off any visible fat.   Use less fat and oil.  Try baking foods instead of frying them. Add less fat, such as margarine, sour cream, regular salad dressing and mayonnaise to foods. Eat fewer high-fat foods. Some examples of high-fat foods include french fries, doughnuts, ice cream, and cakes.  Eat fewer sweets.  Limit foods and drinks that are high in sugar. This includes candy, cookies, regular soda, and sweetened drinks.  Exercise:  Exercise at least 30 minutes per day on most days of the week. Some examples of exercise include walking, biking, dancing, and swimming. You can also fit in more physical activity by taking the stairs instead of the elevator or parking farther away from stores. Ask your healthcare provider about the best exercise plan for you.      © Copyright [employer] 2018 Information is for End User's use only and may not be sold, redistributed or otherwise used for commercial purposes. All illustrations and images included in CareNotes® are the copyrighted property of [employer] or [employer]

## 2024-12-05 ENCOUNTER — APPOINTMENT (OUTPATIENT)
Dept: LAB | Facility: CLINIC | Age: 89
End: 2024-12-05
Payer: MEDICARE

## 2024-12-05 DIAGNOSIS — E03.9 ACQUIRED HYPOTHYROIDISM: ICD-10-CM

## 2024-12-05 DIAGNOSIS — E78.5 DYSLIPIDEMIA: ICD-10-CM

## 2024-12-05 DIAGNOSIS — R73.03 PREDIABETES: ICD-10-CM

## 2024-12-05 DIAGNOSIS — Z13.0 SCREENING FOR DEFICIENCY ANEMIA: ICD-10-CM

## 2024-12-05 LAB
ALBUMIN SERPL BCG-MCNC: 4.1 G/DL (ref 3.5–5)
ALP SERPL-CCNC: 33 U/L (ref 34–104)
ALT SERPL W P-5'-P-CCNC: 13 U/L (ref 7–52)
ANION GAP SERPL CALCULATED.3IONS-SCNC: 8 MMOL/L (ref 4–13)
AST SERPL W P-5'-P-CCNC: 19 U/L (ref 13–39)
BACTERIA UR QL AUTO: ABNORMAL /HPF
BASOPHILS # BLD AUTO: 0.01 THOUSANDS/ÂΜL (ref 0–0.1)
BASOPHILS NFR BLD AUTO: 0 % (ref 0–1)
BILIRUB SERPL-MCNC: 0.64 MG/DL (ref 0.2–1)
BILIRUB UR QL STRIP: NEGATIVE
BUN SERPL-MCNC: 28 MG/DL (ref 5–25)
CALCIUM SERPL-MCNC: 9.5 MG/DL (ref 8.4–10.2)
CHLORIDE SERPL-SCNC: 103 MMOL/L (ref 96–108)
CHOLEST SERPL-MCNC: 133 MG/DL (ref ?–200)
CLARITY UR: CLEAR
CO2 SERPL-SCNC: 30 MMOL/L (ref 21–32)
COLOR UR: YELLOW
CREAT SERPL-MCNC: 1.06 MG/DL (ref 0.6–1.3)
EOSINOPHIL # BLD AUTO: 0.12 THOUSAND/ÂΜL (ref 0–0.61)
EOSINOPHIL NFR BLD AUTO: 3 % (ref 0–6)
ERYTHROCYTE [DISTWIDTH] IN BLOOD BY AUTOMATED COUNT: 14.1 % (ref 11.6–15.1)
GFR SERPL CREATININE-BSD FRML MDRD: 44 ML/MIN/1.73SQ M
GLUCOSE P FAST SERPL-MCNC: 101 MG/DL (ref 65–99)
GLUCOSE UR STRIP-MCNC: NEGATIVE MG/DL
HCT VFR BLD AUTO: 35 % (ref 34.8–46.1)
HDLC SERPL-MCNC: 44 MG/DL
HGB BLD-MCNC: 11.4 G/DL (ref 11.5–15.4)
HGB UR QL STRIP.AUTO: NEGATIVE
HYALINE CASTS #/AREA URNS LPF: ABNORMAL /LPF
IMM GRANULOCYTES # BLD AUTO: 0.02 THOUSAND/UL (ref 0–0.2)
IMM GRANULOCYTES NFR BLD AUTO: 1 % (ref 0–2)
KETONES UR STRIP-MCNC: NEGATIVE MG/DL
LDLC SERPL CALC-MCNC: 58 MG/DL (ref 0–100)
LEUKOCYTE ESTERASE UR QL STRIP: ABNORMAL
LYMPHOCYTES # BLD AUTO: 1.69 THOUSANDS/ÂΜL (ref 0.6–4.47)
LYMPHOCYTES NFR BLD AUTO: 44 % (ref 14–44)
MCH RBC QN AUTO: 31.8 PG (ref 26.8–34.3)
MCHC RBC AUTO-ENTMCNC: 32.6 G/DL (ref 31.4–37.4)
MCV RBC AUTO: 98 FL (ref 82–98)
MONOCYTES # BLD AUTO: 0.21 THOUSAND/ÂΜL (ref 0.17–1.22)
MONOCYTES NFR BLD AUTO: 6 % (ref 4–12)
MUCOUS THREADS UR QL AUTO: ABNORMAL
NEUTROPHILS # BLD AUTO: 1.77 THOUSANDS/ÂΜL (ref 1.85–7.62)
NEUTS SEG NFR BLD AUTO: 46 % (ref 43–75)
NITRITE UR QL STRIP: NEGATIVE
NON-SQ EPI CELLS URNS QL MICRO: ABNORMAL /HPF
NONHDLC SERPL-MCNC: 89 MG/DL
NRBC BLD AUTO-RTO: 0 /100 WBCS
PH UR STRIP.AUTO: 5.5 [PH]
PLATELET # BLD AUTO: 153 THOUSANDS/UL (ref 149–390)
PMV BLD AUTO: 13.6 FL (ref 8.9–12.7)
POTASSIUM SERPL-SCNC: 4.4 MMOL/L (ref 3.5–5.3)
PROT SERPL-MCNC: 6.6 G/DL (ref 6.4–8.4)
PROT UR STRIP-MCNC: ABNORMAL MG/DL
RBC # BLD AUTO: 3.58 MILLION/UL (ref 3.81–5.12)
RBC #/AREA URNS AUTO: ABNORMAL /HPF
SODIUM SERPL-SCNC: 141 MMOL/L (ref 135–147)
SP GR UR STRIP.AUTO: 1.02 (ref 1–1.03)
TRIGL SERPL-MCNC: 153 MG/DL (ref ?–150)
TSH SERPL DL<=0.05 MIU/L-ACNC: 0.49 UIU/ML (ref 0.45–4.5)
UROBILINOGEN UR STRIP-ACNC: <2 MG/DL
WBC # BLD AUTO: 3.82 THOUSAND/UL (ref 4.31–10.16)
WBC #/AREA URNS AUTO: ABNORMAL /HPF

## 2024-12-05 PROCEDURE — 80061 LIPID PANEL: CPT

## 2024-12-05 PROCEDURE — 80053 COMPREHEN METABOLIC PANEL: CPT

## 2024-12-05 PROCEDURE — 36415 COLL VENOUS BLD VENIPUNCTURE: CPT

## 2024-12-05 PROCEDURE — 85025 COMPLETE CBC W/AUTO DIFF WBC: CPT

## 2024-12-05 PROCEDURE — 84443 ASSAY THYROID STIM HORMONE: CPT

## 2024-12-05 PROCEDURE — 81001 URINALYSIS AUTO W/SCOPE: CPT

## 2024-12-06 ENCOUNTER — RESULTS FOLLOW-UP (OUTPATIENT)
Dept: FAMILY MEDICINE CLINIC | Facility: CLINIC | Age: 89
End: 2024-12-06

## 2024-12-16 ENCOUNTER — HOSPITAL ENCOUNTER (OUTPATIENT)
Dept: RADIOLOGY | Facility: HOSPITAL | Age: 89
Discharge: HOME | End: 2024-12-16
Attending: INTERNAL MEDICINE
Payer: MEDICARE

## 2024-12-16 DIAGNOSIS — M81.0 AGE-RELATED OSTEOPOROSIS WITHOUT CURRENT PATHOLOGICAL FRACTURE: ICD-10-CM

## 2024-12-16 PROCEDURE — 77080 DXA BONE DENSITY AXIAL: CPT

## 2025-01-06 DIAGNOSIS — K21.9 GASTROESOPHAGEAL REFLUX DISEASE WITHOUT ESOPHAGITIS: ICD-10-CM

## 2025-01-06 DIAGNOSIS — I10 PRIMARY HYPERTENSION: ICD-10-CM

## 2025-01-06 DIAGNOSIS — E87.6 HYPOKALEMIA: Primary | ICD-10-CM

## 2025-01-06 DIAGNOSIS — E03.9 ACQUIRED HYPOTHYROIDISM: ICD-10-CM

## 2025-01-07 RX ORDER — LEVOTHYROXINE SODIUM 75 UG/1
75 TABLET ORAL
Qty: 90 TABLET | Refills: 1 | Status: SHIPPED | OUTPATIENT
Start: 2025-01-07

## 2025-01-07 RX ORDER — POTASSIUM CHLORIDE 750 MG/1
TABLET, EXTENDED RELEASE ORAL
Qty: 90 TABLET | Refills: 3 | Status: SHIPPED | OUTPATIENT
Start: 2025-01-07

## 2025-01-07 RX ORDER — PANTOPRAZOLE SODIUM 40 MG/1
40 TABLET, DELAYED RELEASE ORAL EVERY MORNING
Qty: 90 TABLET | Refills: 1 | Status: SHIPPED | OUTPATIENT
Start: 2025-01-07

## 2025-01-07 RX ORDER — HYDROCHLOROTHIAZIDE 12.5 MG/1
12.5 TABLET ORAL EVERY MORNING
Qty: 90 TABLET | Refills: 1 | Status: SHIPPED | OUTPATIENT
Start: 2025-01-07

## 2025-01-07 RX ORDER — AMLODIPINE BESYLATE 5 MG/1
5 TABLET ORAL DAILY
Qty: 90 TABLET | Refills: 1 | Status: SHIPPED | OUTPATIENT
Start: 2025-01-07

## 2025-01-07 RX ORDER — NEBIVOLOL 5 MG/1
5 TABLET ORAL EVERY MORNING
Qty: 90 TABLET | Refills: 1 | Status: SHIPPED | OUTPATIENT
Start: 2025-01-07

## 2025-02-25 NOTE — PROGRESS NOTES
Office Visit Note  25     Nisreen Parrish 89 y.o. female MRN: [id number]  : 1927    Assessment:     1. UTI symptoms  Assessment & Plan:  Patient is passing urine frequently especially in the nighttime no burning sensation though urine analysis is showing WBC and leukocyte esterase protein.  Will start the patient on Keflex repeat the urine test after few weeks  Orders:  -     cephalexin (KEFLEX) 250 mg capsule; Take 1 capsule (250 mg total) by mouth every 6 (six) hours for 7 days  2. Primary hypertension  Assessment & Plan:  Please blood pressure is 150/62 slightly high emphasized regarding diet cutting back on salt intake continue HCTZ 12.5 amlodipine 5 mg monitor the blood pressures next visit if necessary adjust the doses of medications.  3. Age-related osteoporosis without current pathological fracture  Assessment & Plan:  DEXA scan done in the recent past  has again shown evidence of osteoporosis based on the right radius.  Patient has refused in the past to go on any medication for the osteoporosis recommend patient to take calcium and vitamin D.  4. Stage 3a chronic kidney disease (HCC)  Assessment & Plan:  Lab Results   Component Value Date    EGFR 44 2024    EGFR 46 2024    EGFR 45 2023    CREATININE 1.06 2024    CREATININE 1.02 2024    CREATININE 1.04 2023   GFR is 44 creatinine 1.06 stable at present time follow-up with periodic BMPs  5. Acquired hypothyroidism  Assessment & Plan:  Patient with hypothyroidism currently taking 75 mcg of levothyroxine TSH level done on  shows normal 0.494 will continue with the same dose.  6. Dizziness  Assessment & Plan:  Patient had an episode of dizziness for which we have prescribed meclizine to which she has responded very well he did not have any more episodes after taking that 1 pill will monitor for now blood pressure is slightly high at 150/62.  7. Dyslipidemia  Assessment & Plan:  Lipid profile  shows cholesterol 833 triglycerides 153 HDL 44 and LDL at 58 currently patient is on simvastatin 20 mg at bedtime continue the same  8. Gastroesophageal reflux disease without esophagitis  Assessment & Plan:  Currently patient is taking pantoprazole 40 mg daily will continue with the same her GERD symptoms are better  9. Primary osteoarthritis involving multiple joints  Assessment & Plan:  Recommend patient to take Tylenol as needed  10. Other neutropenia (HCC)  Assessment & Plan:  CBC shows WBC count is at 3.82 hemoglobin 11.4 hematocrit 35.0 we will follow-up with repeat CBC at a later date             Discussion Summary and Plan:  Today's care plan and medications were reviewed with patient in detail and all their questions answered to their satisfaction.    Chief Complaint   Patient presents with   • Follow-up     Go over test results      Subjective:  Patient is coming here for evaluation regarding symptoms of increased frequency with urination no burning sensation down no abdominal pain no flank pain no fever or any other associated symptoms for past few days.  Last 2 urine analysis did show evidence of WBC occasional bacteria leukocytes positive large protein positive.    Patient complains of aches and pains in the joints especially in the hands.  Medications reviewed labs reviewed.        The following portions of the patient's history were reviewed and updated as appropriate: allergies, current medications, past family history, past medical history, past social history, past surgical history and problem list.    Review of Systems   Constitutional:  Negative for chills and fever.   HENT:  Negative for ear pain and sore throat.    Eyes:  Negative for pain and visual disturbance.   Respiratory:  Negative for cough and shortness of breath.    Cardiovascular:  Negative for chest pain and palpitations.   Gastrointestinal:  Negative for abdominal pain and vomiting.   Genitourinary:  Negative for dysuria and  hematuria.   Musculoskeletal:  Negative for arthralgias and back pain.   Skin:  Negative for color change and rash.   Neurological:  Negative for seizures and syncope.   All other systems reviewed and are negative.        Historical Information   Patient Active Problem List   Diagnosis   • Primary hypertension   • Dyslipidemia   • Acquired hypothyroidism   • Gastroesophageal reflux disease without esophagitis   • Osteoarthritis of multiple joints   • Age-related osteoporosis without current pathological fracture   • Stage 3a chronic kidney disease (HCC)   • Upper respiratory tract infection   • Other neutropenia (HCC)   • Dizziness   • UTI symptoms     Past Medical History:   Diagnosis Date   • Acid reflux    • Arthritis    • DDD (degenerative disc disease), lumbar    • Disease of thyroid gland     hypo   • DJD (degenerative joint disease)    • History of transfusion     many years ago-1940's (after fetal demise-very early pregnancy)   • Hyperlipidemia    • Hypertension    • Osteoporosis    • Restless leg      Past Surgical History:   Procedure Laterality Date   • APPENDECTOMY     • BREAST SURGERY Left     biopsy   • CATARACT EXTRACTION W/ INTRAOCULAR LENS IMPLANT Left 10/24/2016    Procedure: EXTRACTION EXTRACAPSULAR CATARACT PHACO INTRAOCULAR LENS (IOL);  Surgeon: Aaron Meza MD;  Location: [facility] MAIN OR;  Service:    • DILATION AND CURETTAGE OF UTERUS      1970's   • DXA PROCEDURE (HISTORICAL)  08/18/2020   • HEMORRHOID SURGERY  2009   • HYSTERECTOMY  1950    with right oophorectomy   • IN XCAPSL CTRC RMVL INSJ IO LENS PROSTH W/O ECP Right 11/21/2016    Procedure: EXTRACTION EXTRACAPSULAR CATARACT PHACO INTRAOCULAR LENS (IOL);  Surgeon: Aaron Meza MD;  Location: [facility] MAIN OR;  Service: Ophthalmology   • SKIN BIOPSY      exc of the back-1970's   • TOE SURGERY Right 1981    great toe removal of bone, and between the toes cyst removal   • TONSILLECTOMY  1932   • TUBAL LIGATION       Social History      Substance and Sexual Activity   Alcohol Use Never     Social History     Substance and Sexual Activity   Drug Use Never     Social History     Tobacco Use   Smoking Status Former   • Current packs/day: 0.00   • Types: Cigarettes   • Quit date:    • Years since quittin.1   • Passive exposure: Past   Smokeless Tobacco Never   Tobacco Comments    social     Family History   Problem Relation Age of Onset   • Heart disease Father 54        MI     Health Maintenance Due   Topic   • Zoster Vaccine (1 of 2)   • Pneumococcal Vaccine: 65+ Years (1 of 1 - PCV)   • RSV Vaccine for Pregnant Patients and Patients Age 60+ Years (1 - 1-dose 75+ series)   • COVID-19 Vaccine ( season)      Meds/Allergies       Current Outpatient Medications:   •  amLODIPine (NORVASC) 5 mg tablet, TAKE 1 TABLET (5 MG TOTAL) BY MOUTH IN THE MORNING, Disp: 90 tablet, Rfl: 1  •  baclofen 10 mg tablet, Take 0.5 tablets (5 mg total) by mouth 3 (three) times a day as needed for muscle spasms, Disp: 20 tablet, Rfl: 0  •  cephalexin (KEFLEX) 250 mg capsule, Take 1 capsule (250 mg total) by mouth every 6 (six) hours for 7 days, Disp: , Rfl:   •  hydroCHLOROthiazide 12.5 mg tablet, TAKE 1 TABLET (12.5 MG TOTAL) BY MOUTH EVERY MORNING, Disp: 90 tablet, Rfl: 1  •  levothyroxine 75 mcg tablet, TAKE 1 TABLET (75 MCG TOTAL) BY MOUTH DAILY IN THE EARLY MORNING, Disp: 90 tablet, Rfl: 1  •  lidocaine (Lidoderm) 5 %, Apply 1 patch topically over 12 hours daily Remove & Discard patch within 12 hours or as directed by MD, Disp: 15 patch, Rfl: 0  •  meclizine (ANTIVERT) 12.5 MG tablet, Take 1 tablet (12.5 mg total) by mouth 3 (three) times a day as needed for dizziness, Disp: , Rfl:   •  nebivolol (BYSTOLIC) 5 mg tablet, TAKE 1 TABLET (5 MG TOTAL) BY MOUTH EVERY MORNING, Disp: 90 tablet, Rfl: 1  •  Omega-3 Fatty Acids (FISH OIL) 1200 MG CAPS, Take by mouth 2 (two) times a day, Disp: , Rfl:   •  pantoprazole (PROTONIX) 40 mg tablet, TAKE 1 TABLET (40  "MG TOTAL) BY MOUTH EVERY MORNING, Disp: 90 tablet, Rfl: 1  •  potassium chloride (Klor-Con M10) 10 mEq tablet, TAKE 1 TABLET (10 MEQ TOTAL) BY MOUTH EVERY MORNING, Disp: 90 tablet, Rfl: 3  •  potassium chloride (Klor-Con) 10 mEq tablet, Take 1 tablet (10 mEq total) by mouth every morning, Disp: 90 tablet, Rfl: 1  •  simvastatin (ZOCOR) 10 mg tablet, Take 1 tablet (10 mg total) by mouth daily at bedtime, Disp: 90 tablet, Rfl: 3      Objective:    Vitals:   /62   Pulse 74   Ht 4' 9\" (1.448 m)   Wt 58.5 kg (129 lb)   SpO2 96%   BMI 27.92 kg/m²   Body mass index is 27.92 kg/m².  Vitals:    02/27/25 1302   Weight: 58.5 kg (129 lb)       Physical Exam  Vitals and nursing note reviewed.   Constitutional:       Appearance: Normal appearance.   Cardiovascular:      Rate and Rhythm: Normal rate and regular rhythm.      Heart sounds: Normal heart sounds.   Pulmonary:      Effort: Pulmonary effort is normal.      Breath sounds: Normal breath sounds.   Abdominal:      Palpations: Abdomen is soft.      Tenderness: There is no right CVA tenderness or left CVA tenderness.   Musculoskeletal:      Cervical back: Normal range of motion and neck supple.      Right lower leg: No edema.      Left lower leg: No edema.   Skin:     General: Skin is warm and dry.   Neurological:      Mental Status: She is alert and oriented to person, place, and time. Mental status is at baseline.   Psychiatric:         Mood and Affect: Mood normal.         Behavior: Behavior normal.         Lab Review   No visits with results within 2 Month(s) from this visit.   Latest known visit with results is:   Appointment on 12/05/2024   Component Date Value Ref Range Status   • WBC 12/05/2024 3.82 (L)  4.31 - 10.16 Thousand/uL Final   • RBC 12/05/2024 3.58 (L)  3.81 - 5.12 Million/uL Final   • Hemoglobin 12/05/2024 11.4 (L)  11.5 - 15.4 g/dL Final   • Hematocrit 12/05/2024 35.0  34.8 - 46.1 % Final   • MCV 12/05/2024 98  82 - 98 fL Final   • MCH 12/05/2024 " 31.8  26.8 - 34.3 pg Final   • MCHC 12/05/2024 32.6  31.4 - 37.4 g/dL Final   • RDW 12/05/2024 14.1  11.6 - 15.1 % Final   • MPV 12/05/2024 13.6 (H)  8.9 - 12.7 fL Final   • Platelets 12/05/2024 153  149 - 390 Thousands/uL Final   • nRBC 12/05/2024 0  /100 WBCs Final   • Segmented % 12/05/2024 46  43 - 75 % Final   • Immature Grans % 12/05/2024 1  0 - 2 % Final   • Lymphocytes % 12/05/2024 44  14 - 44 % Final   • Monocytes % 12/05/2024 6  4 - 12 % Final   • Eosinophils Relative 12/05/2024 3  0 - 6 % Final   • Basophils Relative 12/05/2024 0  0 - 1 % Final   • Absolute Neutrophils 12/05/2024 1.77 (L)  1.85 - 7.62 Thousands/µL Final   • Absolute Immature Grans 12/05/2024 0.02  0.00 - 0.20 Thousand/uL Final   • Absolute Lymphocytes 12/05/2024 1.69  0.60 - 4.47 Thousands/µL Final   • Absolute Monocytes 12/05/2024 0.21  0.17 - 1.22 Thousand/µL Final   • Eosinophils Absolute 12/05/2024 0.12  0.00 - 0.61 Thousand/µL Final   • Basophils Absolute 12/05/2024 0.01  0.00 - 0.10 Thousands/µL Final   • Sodium 12/05/2024 141  135 - 147 mmol/L Final   • Potassium 12/05/2024 4.4  3.5 - 5.3 mmol/L Final   • Chloride 12/05/2024 103  96 - 108 mmol/L Final   • CO2 12/05/2024 30  21 - 32 mmol/L Final   • ANION GAP 12/05/2024 8  4 - 13 mmol/L Final   • BUN 12/05/2024 28 (H)  5 - 25 mg/dL Final   • Creatinine 12/05/2024 1.06  0.60 - 1.30 mg/dL Final    Standardized to IDMS reference method   • Glucose, Fasting 12/05/2024 101 (H)  65 - 99 mg/dL Final   • Calcium 12/05/2024 9.5  8.4 - 10.2 mg/dL Final   • AST 12/05/2024 19  13 - 39 U/L Final   • ALT 12/05/2024 13  7 - 52 U/L Final    Specimen collection should occur prior to Sulfasalazine administration due to the potential for falsely depressed results.    • Alkaline Phosphatase 12/05/2024 33 (L)  34 - 104 U/L Final   • Total Protein 12/05/2024 6.6  6.4 - 8.4 g/dL Final   • Albumin 12/05/2024 4.1  3.5 - 5.0 g/dL Final   • Total Bilirubin 12/05/2024 0.64  0.20 - 1.00 mg/dL Final    Use of  this assay is not recommended for patients undergoing treatment with eltrombopag due to the potential for falsely elevated results.  N-acetyl-p-benzoquinone imine (metabolite of Acetaminophen) will generate erroneously low results in samples for patients that have taken an overdose of Acetaminophen.   • eGFR 12/05/2024 44  ml/min/1.73sq m Final   • Cholesterol 12/05/2024 133  See Comment mg/dL Final    Cholesterol:         Pediatric <18 Years        Desirable          <170 mg/dL      Borderline High    170-199 mg/dL      High               >=200 mg/dL        Adult >=18 Years            Desirable         <200 mg/dL      Borderline High   200-239 mg/dL      High              >239 mg/dL     • Triglycerides 12/05/2024 153 (H)  See Comment mg/dL Final    Triglyceride:     0-9Y            <75mg/dL     10Y-17Y         <90 mg/dL       >=18Y     Normal          <150 mg/dL     Borderline High 150-199 mg/dL     High            200-499 mg/dL        Very High       >499 mg/dL    Specimen collection should occur prior to Metamizole administration due to the potential for falsely depressed results.   • HDL, Direct 12/05/2024 44 (L)  >=50 mg/dL Final   • LDL Calculated 12/05/2024 58  0 - 100 mg/dL Final    LDL Cholesterol:     Optimal           <100 mg/dl     Near Optimal      100-129 mg/dl     Above Optimal       Borderline High 130-159 mg/dl       High            160-189 mg/dl       Very High       >189 mg/dl         This screening LDL is a calculated result.   It does not have the accuracy of the Direct Measured LDL in the monitoring of patients with hyperlipidemia and/or statin therapy.   Direct Measure LDL (UQD468) must be ordered separately in these patients.   • Non-HDL-Chol (CHOL-HDL) 12/05/2024 89  mg/dl Final   • Color, UA 12/05/2024 Yellow   Final   • Clarity, UA 12/05/2024 Clear   Final   • Specific Gravity, UA 12/05/2024 1.020  1.005 - 1.030 Final   • pH, UA 12/05/2024 5.5  4.5, 5.0, 5.5, 6.0, 6.5, 7.0, 7.5, 8.0 Final  "  • Leukocytes, UA 12/05/2024 Large (A)  Negative Final   • Nitrite, UA 12/05/2024 Negative  Negative Final   • Protein, UA 12/05/2024 30 (1+) (A)  Negative mg/dl Final   • Glucose, UA 12/05/2024 Negative  Negative mg/dl Final   • Ketones, UA 12/05/2024 Negative  Negative mg/dl Final   • Urobilinogen, UA 12/05/2024 <2.0  <2.0 mg/dl mg/dl Final   • Bilirubin, UA 12/05/2024 Negative  Negative Final   • Occult Blood, UA 12/05/2024 Negative  Negative Final   • RBC, UA 12/05/2024 1-2  None Seen, 1-2 /hpf Final   • WBC, UA 12/05/2024 20-30 (A)  None Seen, 1-2 /hpf Final   • Epithelial Cells 12/05/2024 Occasional  None Seen, Occasional /hpf Final   • Bacteria, UA 12/05/2024 Occasional  None Seen, Occasional /hpf Final   • MUCUS THREADS 12/05/2024 Occasional (A)  None Seen Final   • Hyaline Casts, UA 12/05/2024 10-25 (A)  None Seen /lpf Final   • TSH 3RD GENERATON 12/05/2024 0.494  0.450 - 4.500 uIU/mL Final    The recommended reference ranges for TSH during pregnancy are as follows:   First trimester 0.100 to 2.500 uIU/mL   Second trimester  0.200 to 3.000 uIU/mL   Third trimester 0.300 to 3.000 uIU/m    Note: Normal ranges may not apply to patients who are transgender, non-binary, or whose legal sex, sex at birth, and gender identity differ.  Adult TSH (3rd generation) reference range follows the recommended guidelines of the [employer], January, 2020.         Geovanna Bruno MD        \"This note has been constructed using a voice recognition system.Therefore there may be syntax, spelling, and/or grammatical errors. Please call if you have any questions. \"  "

## 2025-02-27 ENCOUNTER — OFFICE VISIT (OUTPATIENT)
Dept: FAMILY MEDICINE CLINIC | Facility: CLINIC | Age: OVER 89
End: 2025-02-27
Payer: MEDICARE

## 2025-02-27 VITALS
WEIGHT: 129 LBS | HEIGHT: 57 IN | SYSTOLIC BLOOD PRESSURE: 150 MMHG | BODY MASS INDEX: 27.83 KG/M2 | DIASTOLIC BLOOD PRESSURE: 62 MMHG | OXYGEN SATURATION: 96 % | HEART RATE: 74 BPM

## 2025-02-27 DIAGNOSIS — N18.31 STAGE 3A CHRONIC KIDNEY DISEASE (HCC): ICD-10-CM

## 2025-02-27 DIAGNOSIS — K21.9 GASTROESOPHAGEAL REFLUX DISEASE WITHOUT ESOPHAGITIS: ICD-10-CM

## 2025-02-27 DIAGNOSIS — I10 PRIMARY HYPERTENSION: ICD-10-CM

## 2025-02-27 DIAGNOSIS — R39.9 UTI SYMPTOMS: Primary | ICD-10-CM

## 2025-02-27 DIAGNOSIS — E03.9 ACQUIRED HYPOTHYROIDISM: ICD-10-CM

## 2025-02-27 DIAGNOSIS — D70.8 OTHER NEUTROPENIA (HCC): ICD-10-CM

## 2025-02-27 DIAGNOSIS — E78.5 DYSLIPIDEMIA: ICD-10-CM

## 2025-02-27 DIAGNOSIS — R42 DIZZINESS: ICD-10-CM

## 2025-02-27 DIAGNOSIS — M15.0 PRIMARY OSTEOARTHRITIS INVOLVING MULTIPLE JOINTS: ICD-10-CM

## 2025-02-27 DIAGNOSIS — M81.0 AGE-RELATED OSTEOPOROSIS WITHOUT CURRENT PATHOLOGICAL FRACTURE: ICD-10-CM

## 2025-02-27 PROCEDURE — 99214 OFFICE O/P EST MOD 30 MIN: CPT | Performed by: INTERNAL MEDICINE

## 2025-02-27 PROCEDURE — G2211 COMPLEX E/M VISIT ADD ON: HCPCS | Performed by: INTERNAL MEDICINE

## 2025-02-27 NOTE — ASSESSMENT & PLAN NOTE
Patient is passing urine frequently especially in the nighttime no burning sensation though urine analysis is showing WBC and leukocyte esterase protein.  Will start the patient on Keflex repeat the urine test after few weeks

## 2025-02-27 NOTE — ASSESSMENT & PLAN NOTE
Lab Results   Component Value Date    EGFR 44 12/05/2024    EGFR 46 05/16/2024    EGFR 45 07/17/2023    CREATININE 1.06 12/05/2024    CREATININE 1.02 05/16/2024    CREATININE 1.04 07/17/2023   GFR is 44 creatinine 1.06 stable at present time follow-up with periodic BMPs

## 2025-02-27 NOTE — ASSESSMENT & PLAN NOTE
Currently patient is taking pantoprazole 40 mg daily will continue with the same her GERD symptoms are better

## 2025-02-27 NOTE — ASSESSMENT & PLAN NOTE
Lipid profile shows cholesterol 833 triglycerides 153 HDL 44 and LDL at 58 currently patient is on simvastatin 20 mg at bedtime continue the same

## 2025-02-27 NOTE — ASSESSMENT & PLAN NOTE
Please blood pressure is 150/62 slightly high emphasized regarding diet cutting back on salt intake continue HCTZ 12.5 amlodipine 5 mg monitor the blood pressures next visit if necessary adjust the doses of medications.

## 2025-02-27 NOTE — ASSESSMENT & PLAN NOTE
DEXA scan done in the recent past December 18 has again shown evidence of osteoporosis based on the right radius.  Patient has refused in the past to go on any medication for the osteoporosis recommend patient to take calcium and vitamin D.

## 2025-02-27 NOTE — ASSESSMENT & PLAN NOTE
Patient had an episode of dizziness for which we have prescribed meclizine to which she has responded very well he did not have any more episodes after taking that 1 pill will monitor for now blood pressure is slightly high at 150/62.

## 2025-02-27 NOTE — ASSESSMENT & PLAN NOTE
Patient with hypothyroidism currently taking 75 mcg of levothyroxine TSH level done on December 5 shows normal 0.494 will continue with the same dose.

## 2025-02-27 NOTE — ASSESSMENT & PLAN NOTE
CBC shows WBC count is at 3.82 hemoglobin 11.4 hematocrit 35.0 we will follow-up with repeat CBC at a later date

## 2025-05-27 NOTE — PROGRESS NOTES
Name: Nisreen Parrish      : 1927      MRN: [id number]  Encounter Provider: Geovanna Bruno MD  Encounter Date: 2025   Encounter department: [facility]  :  Assessment & Plan  Acquired hypothyroidism  Patient is currently taking levothyroxine 75 mcg daily TSH done last December was normal at 0.494 we will continue with the same dose follow-up with repeat TSH level later.       Primary hypertension  Blood pressure today is 140/62 currently patient is taking hydrochlorothiazide 12.5 mg amlodipine 5 mg Nebivolol 5 mg daily continue the same.       Age-related osteoporosis without current pathological fracture  Patient with diagnosis of osteoporosis refuses to go on any medication recommend very strongly to take calcium and vitamin D.  She is afraid to go on any medication for osteoporosis because of the side effects.  She is currently using walker to assist with ambulation and presents regarding to be careful with ambulation not to fall and injure herself.       Stage 3a chronic kidney disease (HCC)  Lab Results   Component Value Date    EGFR 44 2024    EGFR 46 2024    EGFR 45 2023    CREATININE 1.06 2024    CREATININE 1.02 2024    CREATININE 1.04 2023   GFR is at 44 in December same as before we will follow-up with repeat 1       Primary osteoarthritis involving multiple joints  Patient is currently taking Tylenol as needed will continue with the same avoiding nonsteroidals patient with CKD       Dyslipidemia  Currently patient is taking simvastatin 20 mg daily last cholesterol level done 2024 he is 133 triglycerides 153 HDL 44 LDL is 58 continue the same dose for now       Gastroesophageal reflux disease without esophagitis  Continue pantoprazole 40 mg daily       Dizziness  No episodes of dizziness in the recent past stable              History of Present Illness   Patient is coming here for a follow-up evaluation with regards to symptoms  "of hypothyroidism, hypertension, GERD, osteoarthritis, osteoporosis, CKD.  Patient also has gout dyslipidemia.  Denies any symptoms of chest pain palpitation shortness of breath.  Currently using walker to assist with ambulation.  Does complain sometimes aches and pains in the joints.    Medications reviewed labs reviewed      Review of Systems   Constitutional:  Negative for chills and fever.   HENT:  Negative for ear pain and sore throat.    Eyes:  Negative for pain and visual disturbance.   Respiratory:  Negative for cough and shortness of breath.    Cardiovascular:  Negative for chest pain and palpitations.   Gastrointestinal:  Negative for abdominal pain and vomiting.   Genitourinary:  Negative for dysuria and hematuria.   Musculoskeletal:  Positive for arthralgias. Negative for back pain.   Skin:  Negative for color change and rash.   Neurological:  Negative for seizures and syncope.   All other systems reviewed and are negative.      Objective   /62 (BP Location: Right arm, Patient Position: Sitting, Cuff Size: Standard)   Pulse 57   Ht 4' 9\" (1.448 m)   Wt 59.3 kg (130 lb 12.8 oz)   SpO2 98%   BMI 28.30 kg/m²      Physical Exam  Vitals and nursing note reviewed.   Constitutional:       General: She is not in acute distress.     Appearance: She is well-developed.   HENT:      Head: Normocephalic and atraumatic.     Eyes:      Conjunctiva/sclera: Conjunctivae normal.       Cardiovascular:      Rate and Rhythm: Normal rate and regular rhythm.      Heart sounds: No murmur heard.  Pulmonary:      Effort: Pulmonary effort is normal. No respiratory distress.      Breath sounds: Normal breath sounds.   Abdominal:      Palpations: Abdomen is soft.      Tenderness: There is no abdominal tenderness.     Musculoskeletal:         General: No swelling.      Cervical back: Neck supple.     Skin:     General: Skin is warm and dry.      Capillary Refill: Capillary refill takes less than 2 seconds. "     Neurological:      Mental Status: She is alert.     Psychiatric:         Mood and Affect: Mood normal.     No results found for this or any previous visit (from the past 8 weeks).

## 2025-05-28 ENCOUNTER — OFFICE VISIT (OUTPATIENT)
Dept: FAMILY MEDICINE CLINIC | Facility: CLINIC | Age: OVER 89
End: 2025-05-28
Payer: MEDICARE

## 2025-05-28 VITALS
SYSTOLIC BLOOD PRESSURE: 140 MMHG | DIASTOLIC BLOOD PRESSURE: 62 MMHG | BODY MASS INDEX: 28.22 KG/M2 | HEIGHT: 57 IN | HEART RATE: 57 BPM | WEIGHT: 130.8 LBS | OXYGEN SATURATION: 98 %

## 2025-05-28 DIAGNOSIS — E03.9 ACQUIRED HYPOTHYROIDISM: Primary | ICD-10-CM

## 2025-05-28 DIAGNOSIS — M15.0 PRIMARY OSTEOARTHRITIS INVOLVING MULTIPLE JOINTS: ICD-10-CM

## 2025-05-28 DIAGNOSIS — E78.5 DYSLIPIDEMIA: ICD-10-CM

## 2025-05-28 DIAGNOSIS — M81.0 AGE-RELATED OSTEOPOROSIS WITHOUT CURRENT PATHOLOGICAL FRACTURE: ICD-10-CM

## 2025-05-28 DIAGNOSIS — K21.9 GASTROESOPHAGEAL REFLUX DISEASE WITHOUT ESOPHAGITIS: ICD-10-CM

## 2025-05-28 DIAGNOSIS — N18.31 STAGE 3A CHRONIC KIDNEY DISEASE (HCC): ICD-10-CM

## 2025-05-28 DIAGNOSIS — I10 PRIMARY HYPERTENSION: ICD-10-CM

## 2025-05-28 DIAGNOSIS — R42 DIZZINESS: ICD-10-CM

## 2025-05-28 PROCEDURE — 99214 OFFICE O/P EST MOD 30 MIN: CPT | Performed by: INTERNAL MEDICINE

## 2025-05-28 PROCEDURE — G2211 COMPLEX E/M VISIT ADD ON: HCPCS | Performed by: INTERNAL MEDICINE

## 2025-05-28 NOTE — ASSESSMENT & PLAN NOTE
Lab Results   Component Value Date    EGFR 44 12/05/2024    EGFR 46 05/16/2024    EGFR 45 07/17/2023    CREATININE 1.06 12/05/2024    CREATININE 1.02 05/16/2024    CREATININE 1.04 07/17/2023   GFR is at 44 in December same as before we will follow-up with repeat 1

## 2025-05-28 NOTE — ASSESSMENT & PLAN NOTE
Patient is currently taking Tylenol as needed will continue with the same avoiding nonsteroidals patient with CKD

## 2025-05-28 NOTE — ASSESSMENT & PLAN NOTE
Blood pressure today is 140/62 currently patient is taking hydrochlorothiazide 12.5 mg amlodipine 5 mg Nebivolol 5 mg daily continue the same.

## 2025-05-28 NOTE — ASSESSMENT & PLAN NOTE
Patient is currently taking levothyroxine 75 mcg daily TSH done last December was normal at 0.494 we will continue with the same dose follow-up with repeat TSH level later.

## 2025-05-28 NOTE — ASSESSMENT & PLAN NOTE
Currently patient is taking simvastatin 20 mg daily last cholesterol level done December 2024 he is 133 triglycerides 153 HDL 44 LDL is 58 continue the same dose for now

## 2025-05-28 NOTE — ASSESSMENT & PLAN NOTE
Patient with diagnosis of osteoporosis refuses to go on any medication recommend very strongly to take calcium and vitamin D.  She is afraid to go on any medication for osteoporosis because of the side effects.  She is currently using walker to assist with ambulation and presents regarding to be careful with ambulation not to fall and injure herself.

## 2025-06-15 DIAGNOSIS — I10 PRIMARY HYPERTENSION: ICD-10-CM

## 2025-06-15 DIAGNOSIS — E78.5 DYSLIPIDEMIA: ICD-10-CM

## 2025-06-15 DIAGNOSIS — K21.9 GASTROESOPHAGEAL REFLUX DISEASE WITHOUT ESOPHAGITIS: ICD-10-CM

## 2025-06-15 DIAGNOSIS — E03.9 ACQUIRED HYPOTHYROIDISM: ICD-10-CM

## 2025-06-15 RX ORDER — NEBIVOLOL 5 MG/1
5 TABLET ORAL EVERY MORNING
Qty: 90 TABLET | Refills: 1 | Status: SHIPPED | OUTPATIENT
Start: 2025-06-15

## 2025-06-15 RX ORDER — AMLODIPINE BESYLATE 5 MG/1
5 TABLET ORAL EVERY MORNING
Qty: 90 TABLET | Refills: 1 | Status: SHIPPED | OUTPATIENT
Start: 2025-06-15

## 2025-06-15 RX ORDER — LEVOTHYROXINE SODIUM 75 UG/1
TABLET ORAL
Qty: 90 TABLET | Refills: 1 | Status: SHIPPED | OUTPATIENT
Start: 2025-06-15

## 2025-06-15 RX ORDER — PANTOPRAZOLE SODIUM 40 MG/1
40 TABLET, DELAYED RELEASE ORAL EVERY MORNING
Qty: 90 TABLET | Refills: 1 | Status: SHIPPED | OUTPATIENT
Start: 2025-06-15

## 2025-06-15 RX ORDER — HYDROCHLOROTHIAZIDE 12.5 MG/1
12.5 TABLET ORAL EVERY MORNING
Qty: 90 TABLET | Refills: 1 | Status: SHIPPED | OUTPATIENT
Start: 2025-06-15

## 2025-06-15 RX ORDER — SIMVASTATIN 10 MG
10 TABLET ORAL
Qty: 90 TABLET | Refills: 1 | Status: SHIPPED | OUTPATIENT
Start: 2025-06-15